# Patient Record
Sex: MALE | Race: WHITE | NOT HISPANIC OR LATINO | Employment: FULL TIME | URBAN - METROPOLITAN AREA
[De-identification: names, ages, dates, MRNs, and addresses within clinical notes are randomized per-mention and may not be internally consistent; named-entity substitution may affect disease eponyms.]

---

## 2017-02-09 ENCOUNTER — COMMUNICATION - HEALTHEAST (OUTPATIENT)
Dept: RHEUMATOLOGY | Facility: CLINIC | Age: 57
End: 2017-02-09

## 2017-03-16 ENCOUNTER — OFFICE VISIT - HEALTHEAST (OUTPATIENT)
Dept: RHEUMATOLOGY | Facility: CLINIC | Age: 57
End: 2017-03-16

## 2017-03-16 ENCOUNTER — RECORDS - HEALTHEAST (OUTPATIENT)
Dept: GENERAL RADIOLOGY | Facility: CLINIC | Age: 57
End: 2017-03-16

## 2017-03-16 DIAGNOSIS — G89.29 OTHER CHRONIC PAIN: ICD-10-CM

## 2017-03-16 DIAGNOSIS — M25.561 CHRONIC PAIN OF RIGHT KNEE: ICD-10-CM

## 2017-03-16 DIAGNOSIS — G89.29 CHRONIC PAIN OF RIGHT KNEE: ICD-10-CM

## 2017-03-16 DIAGNOSIS — L40.50 ARTHROPATHIC PSORIASIS, UNSPECIFIED (H): ICD-10-CM

## 2017-03-16 DIAGNOSIS — M25.561 PAIN IN RIGHT KNEE: ICD-10-CM

## 2017-03-16 DIAGNOSIS — L40.50 PSORIATIC ARTHRITIS (H): ICD-10-CM

## 2017-03-16 DIAGNOSIS — M17.0 BILATERAL PRIMARY OSTEOARTHRITIS OF KNEE: ICD-10-CM

## 2017-03-16 DIAGNOSIS — Z79.899 HIGH RISK MEDICATION USE: ICD-10-CM

## 2017-03-16 LAB
ALT SERPL W P-5'-P-CCNC: 18 U/L (ref 0–45)
CREAT SERPL-MCNC: 0.91 MG/DL (ref 0.7–1.3)
GFR SERPL CREATININE-BSD FRML MDRD: >60 ML/MIN/1.73M2

## 2017-03-16 ASSESSMENT — MIFFLIN-ST. JEOR: SCORE: 1881.43

## 2017-03-22 ENCOUNTER — COMMUNICATION - HEALTHEAST (OUTPATIENT)
Dept: RHEUMATOLOGY | Facility: CLINIC | Age: 57
End: 2017-03-22

## 2017-03-22 DIAGNOSIS — L40.50 PSORIATIC ARTHRITIS (H): ICD-10-CM

## 2017-03-23 ENCOUNTER — COMMUNICATION - HEALTHEAST (OUTPATIENT)
Dept: RHEUMATOLOGY | Facility: CLINIC | Age: 57
End: 2017-03-23

## 2017-03-23 DIAGNOSIS — L40.50 PSORIATIC ARTHRITIS (H): ICD-10-CM

## 2017-04-24 ENCOUNTER — COMMUNICATION - HEALTHEAST (OUTPATIENT)
Dept: RHEUMATOLOGY | Facility: CLINIC | Age: 57
End: 2017-04-24

## 2018-03-29 ENCOUNTER — COMMUNICATION - HEALTHEAST (OUTPATIENT)
Dept: ADMINISTRATIVE | Facility: CLINIC | Age: 58
End: 2018-03-29

## 2018-04-17 ENCOUNTER — OFFICE VISIT - HEALTHEAST (OUTPATIENT)
Dept: RHEUMATOLOGY | Facility: CLINIC | Age: 58
End: 2018-04-17

## 2018-04-17 DIAGNOSIS — M46.1 DEGENERATIVE JOINT DISEASE OF SACROILIAC JOINT (H): ICD-10-CM

## 2018-04-17 DIAGNOSIS — M15.0 PRIMARY OSTEOARTHRITIS INVOLVING MULTIPLE JOINTS: ICD-10-CM

## 2018-04-17 DIAGNOSIS — Z79.899 HIGH RISK MEDICATION USE: ICD-10-CM

## 2018-04-17 DIAGNOSIS — L40.50 PSORIATIC ARTHRITIS (H): ICD-10-CM

## 2018-04-17 LAB
ALBUMIN SERPL-MCNC: 3.7 G/DL (ref 3.5–5)
ALT SERPL W P-5'-P-CCNC: 15 U/L (ref 0–45)
BASOPHILS # BLD AUTO: 0.1 THOU/UL (ref 0–0.2)
BASOPHILS NFR BLD AUTO: 1 % (ref 0–2)
C REACTIVE PROTEIN LHE: 0.7 MG/DL (ref 0–0.8)
CREAT SERPL-MCNC: 0.85 MG/DL (ref 0.7–1.3)
EOSINOPHIL # BLD AUTO: 0.3 THOU/UL (ref 0–0.4)
EOSINOPHIL NFR BLD AUTO: 3 % (ref 0–6)
ERYTHROCYTE [DISTWIDTH] IN BLOOD BY AUTOMATED COUNT: 12 % (ref 11–14.5)
ERYTHROCYTE [SEDIMENTATION RATE] IN BLOOD BY WESTERGREN METHOD: 15 MM/HR (ref 0–15)
GFR SERPL CREATININE-BSD FRML MDRD: >60 ML/MIN/1.73M2
HCT VFR BLD AUTO: 44.3 % (ref 40–54)
HGB BLD-MCNC: 15.2 G/DL (ref 14–18)
LYMPHOCYTES # BLD AUTO: 2 THOU/UL (ref 0.8–4.4)
LYMPHOCYTES NFR BLD AUTO: 20 % (ref 20–40)
MCH RBC QN AUTO: 30.5 PG (ref 27–34)
MCHC RBC AUTO-ENTMCNC: 34.3 G/DL (ref 32–36)
MCV RBC AUTO: 89 FL (ref 80–100)
MONOCYTES # BLD AUTO: 0.7 THOU/UL (ref 0–0.9)
MONOCYTES NFR BLD AUTO: 7 % (ref 2–10)
NEUTROPHILS # BLD AUTO: 7.1 THOU/UL (ref 2–7.7)
NEUTROPHILS NFR BLD AUTO: 70 % (ref 50–70)
PLATELET # BLD AUTO: 290 THOU/UL (ref 140–440)
PMV BLD AUTO: 7 FL (ref 7–10)
RBC # BLD AUTO: 4.99 MILL/UL (ref 4.4–6.2)
WBC: 10.1 THOU/UL (ref 4–11)

## 2018-04-17 ASSESSMENT — MIFFLIN-ST. JEOR: SCORE: 1813.6

## 2018-05-29 ENCOUNTER — AMBULATORY - HEALTHEAST (OUTPATIENT)
Dept: LAB | Facility: CLINIC | Age: 58
End: 2018-05-29

## 2018-05-29 DIAGNOSIS — Z79.899 HIGH RISK MEDICATION USE: ICD-10-CM

## 2018-05-29 LAB
ALBUMIN SERPL-MCNC: 3.7 G/DL (ref 3.5–5)
ALT SERPL W P-5'-P-CCNC: 27 U/L (ref 0–45)
CREAT SERPL-MCNC: 0.82 MG/DL (ref 0.7–1.3)
ERYTHROCYTE [DISTWIDTH] IN BLOOD BY AUTOMATED COUNT: 11.4 % (ref 11–14.5)
GFR SERPL CREATININE-BSD FRML MDRD: >60 ML/MIN/1.73M2
HCT VFR BLD AUTO: 42.6 % (ref 40–54)
HGB BLD-MCNC: 14.2 G/DL (ref 14–18)
MCH RBC QN AUTO: 29.7 PG (ref 27–34)
MCHC RBC AUTO-ENTMCNC: 33.2 G/DL (ref 32–36)
MCV RBC AUTO: 89 FL (ref 80–100)
PLATELET # BLD AUTO: 258 THOU/UL (ref 140–440)
PMV BLD AUTO: 6.7 FL (ref 7–10)
RBC # BLD AUTO: 4.77 MILL/UL (ref 4.4–6.2)
WBC: 6.2 THOU/UL (ref 4–11)

## 2018-06-12 ENCOUNTER — COMMUNICATION - HEALTHEAST (OUTPATIENT)
Dept: RHEUMATOLOGY | Facility: CLINIC | Age: 58
End: 2018-06-12

## 2018-06-12 DIAGNOSIS — L40.50 PSORIATIC ARTHRITIS (H): ICD-10-CM

## 2018-07-16 ENCOUNTER — AMBULATORY - HEALTHEAST (OUTPATIENT)
Dept: LAB | Facility: CLINIC | Age: 58
End: 2018-07-16

## 2018-07-16 DIAGNOSIS — Z79.899 HIGH RISK MEDICATION USE: ICD-10-CM

## 2018-07-16 LAB
ALBUMIN SERPL-MCNC: 3.8 G/DL (ref 3.5–5)
ALT SERPL W P-5'-P-CCNC: 28 U/L (ref 0–45)
CREAT SERPL-MCNC: 0.89 MG/DL (ref 0.7–1.3)
ERYTHROCYTE [DISTWIDTH] IN BLOOD BY AUTOMATED COUNT: 12.7 % (ref 11–14.5)
GFR SERPL CREATININE-BSD FRML MDRD: >60 ML/MIN/1.73M2
HCT VFR BLD AUTO: 37.4 % (ref 40–54)
HGB BLD-MCNC: 12.4 G/DL (ref 14–18)
MCH RBC QN AUTO: 27.2 PG (ref 27–34)
MCHC RBC AUTO-ENTMCNC: 33.1 G/DL (ref 32–36)
MCV RBC AUTO: 82 FL (ref 80–100)
PLATELET # BLD AUTO: 402 THOU/UL (ref 140–440)
PMV BLD AUTO: 7.8 FL (ref 7–10)
RBC # BLD AUTO: 4.56 MILL/UL (ref 4.4–6.2)
WBC: 8.7 THOU/UL (ref 4–11)

## 2018-07-19 ENCOUNTER — OFFICE VISIT - HEALTHEAST (OUTPATIENT)
Dept: RHEUMATOLOGY | Facility: CLINIC | Age: 58
End: 2018-07-19

## 2018-07-19 ENCOUNTER — COMMUNICATION - HEALTHEAST (OUTPATIENT)
Dept: RHEUMATOLOGY | Facility: CLINIC | Age: 58
End: 2018-07-19

## 2018-07-19 DIAGNOSIS — L40.50 PSORIATIC ARTHRITIS (H): ICD-10-CM

## 2018-07-19 DIAGNOSIS — M15.0 PRIMARY OSTEOARTHRITIS INVOLVING MULTIPLE JOINTS: ICD-10-CM

## 2018-07-19 DIAGNOSIS — G89.29 CHRONIC PAIN OF RIGHT KNEE: ICD-10-CM

## 2018-07-19 DIAGNOSIS — M25.561 CHRONIC PAIN OF RIGHT KNEE: ICD-10-CM

## 2018-07-19 DIAGNOSIS — Z79.899 HIGH RISK MEDICATION USE: ICD-10-CM

## 2018-08-31 ENCOUNTER — HOSPITAL ENCOUNTER (OUTPATIENT)
Dept: ULTRASOUND IMAGING | Facility: CLINIC | Age: 58
Discharge: HOME | End: 2018-08-31
Attending: FAMILY MEDICINE

## 2018-08-31 ENCOUNTER — RECORDS - HEALTHEAST (OUTPATIENT)
Dept: ADMINISTRATIVE | Facility: OTHER | Age: 58
End: 2018-08-31

## 2018-08-31 DIAGNOSIS — M79.89 SWELLING OF LEFT HAND: ICD-10-CM

## 2018-09-17 ENCOUNTER — COMMUNICATION - HEALTHEAST (OUTPATIENT)
Dept: RHEUMATOLOGY | Facility: CLINIC | Age: 58
End: 2018-09-17

## 2018-09-19 ENCOUNTER — OFFICE VISIT - HEALTHEAST (OUTPATIENT)
Dept: RHEUMATOLOGY | Facility: CLINIC | Age: 58
End: 2018-09-19

## 2018-09-19 DIAGNOSIS — M15.0 PRIMARY OSTEOARTHRITIS INVOLVING MULTIPLE JOINTS: ICD-10-CM

## 2018-09-19 DIAGNOSIS — L40.50 PSORIATIC ARTHRITIS (H): ICD-10-CM

## 2018-09-19 DIAGNOSIS — Z79.899 HIGH RISK MEDICATION USE: ICD-10-CM

## 2018-09-19 LAB
ALBUMIN SERPL-MCNC: 3.6 G/DL (ref 3.5–5)
ALT SERPL W P-5'-P-CCNC: 28 U/L (ref 0–45)
CREAT SERPL-MCNC: 0.92 MG/DL (ref 0.7–1.3)
ERYTHROCYTE [DISTWIDTH] IN BLOOD BY AUTOMATED COUNT: 11.5 % (ref 11–14.5)
GFR SERPL CREATININE-BSD FRML MDRD: >60 ML/MIN/1.73M2
HCT VFR BLD AUTO: 41.9 % (ref 40–54)
HGB BLD-MCNC: 14.6 G/DL (ref 14–18)
MCH RBC QN AUTO: 30.6 PG (ref 27–34)
MCHC RBC AUTO-ENTMCNC: 34.8 G/DL (ref 32–36)
MCV RBC AUTO: 88 FL (ref 80–100)
PLATELET # BLD AUTO: 253 THOU/UL (ref 140–440)
PMV BLD AUTO: 7.3 FL (ref 7–10)
RBC # BLD AUTO: 4.78 MILL/UL (ref 4.4–6.2)
WBC: 8 THOU/UL (ref 4–11)

## 2018-09-19 ASSESSMENT — MIFFLIN-ST. JEOR: SCORE: 1809.06

## 2018-11-20 ENCOUNTER — AMBULATORY - HEALTHEAST (OUTPATIENT)
Dept: LAB | Facility: CLINIC | Age: 58
End: 2018-11-20

## 2018-11-20 DIAGNOSIS — Z79.899 HIGH RISK MEDICATION USE: ICD-10-CM

## 2018-11-20 LAB
ALBUMIN SERPL-MCNC: 3.7 G/DL (ref 3.5–5)
ALT SERPL W P-5'-P-CCNC: 22 U/L (ref 0–45)
CREAT SERPL-MCNC: 0.81 MG/DL (ref 0.7–1.3)
ERYTHROCYTE [DISTWIDTH] IN BLOOD BY AUTOMATED COUNT: 11.5 % (ref 11–14.5)
GFR SERPL CREATININE-BSD FRML MDRD: >60 ML/MIN/1.73M2
HCT VFR BLD AUTO: 42.2 % (ref 40–54)
HGB BLD-MCNC: 14.6 G/DL (ref 14–18)
MCH RBC QN AUTO: 30.7 PG (ref 27–34)
MCHC RBC AUTO-ENTMCNC: 34.6 G/DL (ref 32–36)
MCV RBC AUTO: 89 FL (ref 80–100)
PLATELET # BLD AUTO: 245 THOU/UL (ref 140–440)
PMV BLD AUTO: 6.9 FL (ref 7–10)
RBC # BLD AUTO: 4.75 MILL/UL (ref 4.4–6.2)
WBC: 7.9 THOU/UL (ref 4–11)

## 2018-11-21 ENCOUNTER — OFFICE VISIT - HEALTHEAST (OUTPATIENT)
Dept: RHEUMATOLOGY | Facility: CLINIC | Age: 58
End: 2018-11-21

## 2018-11-21 DIAGNOSIS — L40.50 PSORIATIC ARTHRITIS (H): ICD-10-CM

## 2018-11-21 DIAGNOSIS — Z79.899 HIGH RISK MEDICATION USE: ICD-10-CM

## 2018-11-21 DIAGNOSIS — M15.0 PRIMARY OSTEOARTHRITIS INVOLVING MULTIPLE JOINTS: ICD-10-CM

## 2018-11-28 ENCOUNTER — COMMUNICATION - HEALTHEAST (OUTPATIENT)
Dept: RHEUMATOLOGY | Facility: CLINIC | Age: 58
End: 2018-11-28

## 2018-11-28 DIAGNOSIS — L40.50 PSORIATIC ARTHRITIS (H): ICD-10-CM

## 2018-12-13 ENCOUNTER — RECORDS - HEALTHEAST (OUTPATIENT)
Dept: LAB | Facility: CLINIC | Age: 58
End: 2018-12-13

## 2018-12-13 LAB
ANION GAP SERPL CALCULATED.3IONS-SCNC: 10 MMOL/L (ref 5–18)
BUN SERPL-MCNC: 14 MG/DL (ref 8–22)
CALCIUM SERPL-MCNC: 9.5 MG/DL (ref 8.5–10.5)
CHLORIDE BLD-SCNC: 107 MMOL/L (ref 98–107)
CO2 SERPL-SCNC: 26 MMOL/L (ref 22–31)
CREAT SERPL-MCNC: 0.82 MG/DL (ref 0.7–1.3)
GFR SERPL CREATININE-BSD FRML MDRD: >60 ML/MIN/1.73M2
GLUCOSE BLD-MCNC: 77 MG/DL (ref 70–125)
POTASSIUM BLD-SCNC: 4.4 MMOL/L (ref 3.5–5)
SODIUM SERPL-SCNC: 143 MMOL/L (ref 136–145)

## 2018-12-14 LAB — HBA1C MFR BLD: 5.4 % (ref 4.2–6.1)

## 2019-01-25 ENCOUNTER — COMMUNICATION - HEALTHEAST (OUTPATIENT)
Dept: RHEUMATOLOGY | Facility: CLINIC | Age: 59
End: 2019-01-25

## 2019-01-25 DIAGNOSIS — L40.50 PSORIATIC ARTHRITIS (H): ICD-10-CM

## 2019-02-23 ENCOUNTER — COMMUNICATION - HEALTHEAST (OUTPATIENT)
Dept: RHEUMATOLOGY | Facility: CLINIC | Age: 59
End: 2019-02-23

## 2019-02-23 DIAGNOSIS — L40.50 PSORIATIC ARTHRITIS (H): ICD-10-CM

## 2019-03-04 ENCOUNTER — AMBULATORY - HEALTHEAST (OUTPATIENT)
Dept: LAB | Facility: CLINIC | Age: 59
End: 2019-03-04

## 2019-03-04 DIAGNOSIS — Z79.899 HIGH RISK MEDICATION USE: ICD-10-CM

## 2019-03-04 LAB
ALBUMIN SERPL-MCNC: 3.9 G/DL (ref 3.5–5)
ALT SERPL W P-5'-P-CCNC: 19 U/L (ref 0–45)
CREAT SERPL-MCNC: 0.87 MG/DL (ref 0.7–1.3)
ERYTHROCYTE [DISTWIDTH] IN BLOOD BY AUTOMATED COUNT: 11.3 % (ref 11–14.5)
GFR SERPL CREATININE-BSD FRML MDRD: >60 ML/MIN/1.73M2
HCT VFR BLD AUTO: 42.7 % (ref 40–54)
HGB BLD-MCNC: 14.5 G/DL (ref 14–18)
MCH RBC QN AUTO: 30.1 PG (ref 27–34)
MCHC RBC AUTO-ENTMCNC: 33.9 G/DL (ref 32–36)
MCV RBC AUTO: 89 FL (ref 80–100)
PLATELET # BLD AUTO: 258 THOU/UL (ref 140–440)
PMV BLD AUTO: 6.8 FL (ref 7–10)
RBC # BLD AUTO: 4.8 MILL/UL (ref 4.4–6.2)
WBC: 8 THOU/UL (ref 4–11)

## 2019-03-05 ENCOUNTER — COMMUNICATION - HEALTHEAST (OUTPATIENT)
Dept: RHEUMATOLOGY | Facility: CLINIC | Age: 59
End: 2019-03-05

## 2019-03-05 ENCOUNTER — RECORDS - HEALTHEAST (OUTPATIENT)
Dept: ADMINISTRATIVE | Facility: OTHER | Age: 59
End: 2019-03-05

## 2019-03-05 DIAGNOSIS — L40.50 PSORIATIC ARTHRITIS (H): ICD-10-CM

## 2019-03-07 ENCOUNTER — AMBULATORY - HEALTHEAST (OUTPATIENT)
Dept: SURGERY | Facility: CLINIC | Age: 59
End: 2019-03-07

## 2019-03-07 DIAGNOSIS — K40.90 LEFT INGUINAL HERNIA: ICD-10-CM

## 2019-03-18 ENCOUNTER — OFFICE VISIT - HEALTHEAST (OUTPATIENT)
Dept: SURGERY | Facility: CLINIC | Age: 59
End: 2019-03-18

## 2019-03-18 DIAGNOSIS — K40.90 LEFT INGUINAL HERNIA: ICD-10-CM

## 2019-03-18 ASSESSMENT — MIFFLIN-ST. JEOR: SCORE: 1828.34

## 2019-05-05 ENCOUNTER — COMMUNICATION - HEALTHEAST (OUTPATIENT)
Dept: RHEUMATOLOGY | Facility: CLINIC | Age: 59
End: 2019-05-05

## 2019-05-05 DIAGNOSIS — L40.50 PSORIATIC ARTHRITIS (H): ICD-10-CM

## 2019-05-07 ENCOUNTER — OFFICE VISIT - HEALTHEAST (OUTPATIENT)
Dept: RHEUMATOLOGY | Facility: CLINIC | Age: 59
End: 2019-05-07

## 2019-05-07 DIAGNOSIS — L40.50 PSORIATIC ARTHRITIS (H): ICD-10-CM

## 2019-05-07 DIAGNOSIS — M15.0 PRIMARY OSTEOARTHRITIS INVOLVING MULTIPLE JOINTS: ICD-10-CM

## 2019-05-07 DIAGNOSIS — Z79.899 HIGH RISK MEDICATION USE: ICD-10-CM

## 2019-05-07 LAB
ALBUMIN SERPL-MCNC: 3.7 G/DL (ref 3.5–5)
ALT SERPL W P-5'-P-CCNC: 20 U/L (ref 0–45)
CREAT SERPL-MCNC: 0.91 MG/DL (ref 0.7–1.3)
ERYTHROCYTE [DISTWIDTH] IN BLOOD BY AUTOMATED COUNT: 11.4 % (ref 11–14.5)
GFR SERPL CREATININE-BSD FRML MDRD: >60 ML/MIN/1.73M2
HCT VFR BLD AUTO: 45.1 % (ref 40–54)
HGB BLD-MCNC: 15.1 G/DL (ref 14–18)
MCH RBC QN AUTO: 29.9 PG (ref 27–34)
MCHC RBC AUTO-ENTMCNC: 33.4 G/DL (ref 32–36)
MCV RBC AUTO: 89 FL (ref 80–100)
PLATELET # BLD AUTO: 280 THOU/UL (ref 140–440)
PMV BLD AUTO: 7.6 FL (ref 7–10)
RBC # BLD AUTO: 5.04 MILL/UL (ref 4.4–6.2)
WBC: 9.1 THOU/UL (ref 4–11)

## 2019-07-16 ENCOUNTER — COMMUNICATION - HEALTHEAST (OUTPATIENT)
Dept: RHEUMATOLOGY | Facility: CLINIC | Age: 59
End: 2019-07-16

## 2019-07-16 DIAGNOSIS — L40.50 PSORIATIC ARTHRITIS (H): ICD-10-CM

## 2019-08-05 ENCOUNTER — AMBULATORY - HEALTHEAST (OUTPATIENT)
Dept: LAB | Facility: CLINIC | Age: 59
End: 2019-08-05

## 2019-08-05 DIAGNOSIS — L40.50 PSORIATIC ARTHRITIS (H): ICD-10-CM

## 2019-08-05 LAB
ALBUMIN SERPL-MCNC: 3.7 G/DL (ref 3.5–5)
ALT SERPL W P-5'-P-CCNC: 27 U/L (ref 0–45)
CREAT SERPL-MCNC: 0.83 MG/DL (ref 0.7–1.3)
ERYTHROCYTE [DISTWIDTH] IN BLOOD BY AUTOMATED COUNT: 12.2 % (ref 11–14.5)
GFR SERPL CREATININE-BSD FRML MDRD: >60 ML/MIN/1.73M2
HCT VFR BLD AUTO: 44.2 % (ref 40–54)
HGB BLD-MCNC: 14.7 G/DL (ref 14–18)
MCH RBC QN AUTO: 29.7 PG (ref 27–34)
MCHC RBC AUTO-ENTMCNC: 33.3 G/DL (ref 32–36)
MCV RBC AUTO: 89 FL (ref 80–100)
PLATELET # BLD AUTO: 258 THOU/UL (ref 140–440)
PMV BLD AUTO: 7.6 FL (ref 7–10)
RBC # BLD AUTO: 4.96 MILL/UL (ref 4.4–6.2)
WBC: 6.4 THOU/UL (ref 4–11)

## 2019-08-07 ENCOUNTER — COMMUNICATION - HEALTHEAST (OUTPATIENT)
Dept: RHEUMATOLOGY | Facility: CLINIC | Age: 59
End: 2019-08-07

## 2019-08-07 ENCOUNTER — COMMUNICATION - HEALTHEAST (OUTPATIENT)
Dept: TELEHEALTH | Facility: CLINIC | Age: 59
End: 2019-08-07

## 2019-08-07 ENCOUNTER — OFFICE VISIT - HEALTHEAST (OUTPATIENT)
Dept: RHEUMATOLOGY | Facility: CLINIC | Age: 59
End: 2019-08-07

## 2019-08-07 DIAGNOSIS — L40.50 PSORIATIC ARTHRITIS (H): ICD-10-CM

## 2019-08-07 DIAGNOSIS — M46.1 DEGENERATIVE JOINT DISEASE OF SACROILIAC JOINT (H): ICD-10-CM

## 2019-08-07 DIAGNOSIS — L40.9 PSORIASIS: ICD-10-CM

## 2019-08-13 ENCOUNTER — COMMUNICATION - HEALTHEAST (OUTPATIENT)
Dept: ADMINISTRATIVE | Facility: CLINIC | Age: 59
End: 2019-08-13

## 2019-10-09 ENCOUNTER — COMMUNICATION - HEALTHEAST (OUTPATIENT)
Dept: RHEUMATOLOGY | Facility: CLINIC | Age: 59
End: 2019-10-09

## 2019-10-09 DIAGNOSIS — L40.50 PSORIATIC ARTHRITIS (H): ICD-10-CM

## 2019-11-09 ENCOUNTER — COMMUNICATION - HEALTHEAST (OUTPATIENT)
Dept: RHEUMATOLOGY | Facility: CLINIC | Age: 59
End: 2019-11-09

## 2019-11-09 DIAGNOSIS — L40.50 PSORIATIC ARTHRITIS (H): ICD-10-CM

## 2019-11-12 ENCOUNTER — AMBULATORY - HEALTHEAST (OUTPATIENT)
Dept: LAB | Facility: CLINIC | Age: 59
End: 2019-11-12

## 2019-11-12 DIAGNOSIS — L40.50 PSORIATIC ARTHRITIS (H): ICD-10-CM

## 2019-11-12 LAB
ALBUMIN SERPL-MCNC: 3.7 G/DL (ref 3.5–5)
ALT SERPL W P-5'-P-CCNC: 35 U/L (ref 0–45)
CREAT SERPL-MCNC: 0.87 MG/DL (ref 0.7–1.3)
ERYTHROCYTE [DISTWIDTH] IN BLOOD BY AUTOMATED COUNT: 11.7 % (ref 11–14.5)
GFR SERPL CREATININE-BSD FRML MDRD: >60 ML/MIN/1.73M2
HCT VFR BLD AUTO: 46.2 % (ref 40–54)
HGB BLD-MCNC: 15.3 G/DL (ref 14–18)
MCH RBC QN AUTO: 29.7 PG (ref 27–34)
MCHC RBC AUTO-ENTMCNC: 33.1 G/DL (ref 32–36)
MCV RBC AUTO: 90 FL (ref 80–100)
PLATELET # BLD AUTO: 265 THOU/UL (ref 140–440)
PMV BLD AUTO: 7.8 FL (ref 7–10)
RBC # BLD AUTO: 5.14 MILL/UL (ref 4.4–6.2)
WBC: 7.4 THOU/UL (ref 4–11)

## 2019-11-13 ENCOUNTER — COMMUNICATION - HEALTHEAST (OUTPATIENT)
Dept: RHEUMATOLOGY | Facility: CLINIC | Age: 59
End: 2019-11-13

## 2019-11-13 ENCOUNTER — OFFICE VISIT - HEALTHEAST (OUTPATIENT)
Dept: RHEUMATOLOGY | Facility: CLINIC | Age: 59
End: 2019-11-13

## 2019-11-13 DIAGNOSIS — M15.0 PRIMARY OSTEOARTHRITIS INVOLVING MULTIPLE JOINTS: ICD-10-CM

## 2019-11-13 DIAGNOSIS — L40.50 PSORIATIC ARTHRITIS (H): ICD-10-CM

## 2019-11-13 DIAGNOSIS — G89.29 CHRONIC PAIN OF RIGHT KNEE: ICD-10-CM

## 2019-11-13 DIAGNOSIS — M25.561 CHRONIC PAIN OF RIGHT KNEE: ICD-10-CM

## 2019-11-13 LAB
APPEARANCE FLD: ABNORMAL
COLOR FLD: YELLOW
CRYSTALS SNV MICRO: NORMAL
EOSINOPHIL NFR FLD MANUAL: ABNORMAL %
LYMPHOCYTES NFR FLD MANUAL: 28 %
MACROPHAGE % - HISTORICAL: 35 %
MESOTHELIALS, FLUID: ABNORMAL
MONOCYTE % - HISTORICAL: 15 %
NEUTS BAND NFR FLD MANUAL: 22 %
OTHER CELLS FLD MANUAL: ABNORMAL
RBC FLUID - HISTORICAL: ABNORMAL /UL
WBC # FLD AUTO: 302 /UL (ref 0–99)

## 2019-11-13 ASSESSMENT — MIFFLIN-ST. JEOR: SCORE: 1845.57

## 2019-11-16 LAB
BACTERIA SPEC CULT: NO GROWTH
GRAM STAIN RESULT: NORMAL
GRAM STAIN RESULT: NORMAL

## 2019-12-12 ENCOUNTER — COMMUNICATION - HEALTHEAST (OUTPATIENT)
Dept: RHEUMATOLOGY | Facility: CLINIC | Age: 59
End: 2019-12-12

## 2019-12-12 DIAGNOSIS — L40.50 PSORIATIC ARTHRITIS (H): ICD-10-CM

## 2020-01-28 ENCOUNTER — COMMUNICATION - HEALTHEAST (OUTPATIENT)
Dept: ADMINISTRATIVE | Facility: CLINIC | Age: 60
End: 2020-01-28

## 2020-01-28 DIAGNOSIS — L40.50 PSORIATIC ARTHRITIS (H): ICD-10-CM

## 2020-01-29 ENCOUNTER — COMMUNICATION - HEALTHEAST (OUTPATIENT)
Dept: RHEUMATOLOGY | Facility: CLINIC | Age: 60
End: 2020-01-29

## 2020-02-10 ENCOUNTER — AMBULATORY - HEALTHEAST (OUTPATIENT)
Dept: LAB | Facility: CLINIC | Age: 60
End: 2020-02-10

## 2020-02-10 DIAGNOSIS — L40.50 PSORIATIC ARTHRITIS (H): ICD-10-CM

## 2020-02-10 LAB
ALBUMIN SERPL-MCNC: 3.8 G/DL (ref 3.5–5)
ALT SERPL W P-5'-P-CCNC: 25 U/L (ref 0–45)
CREAT SERPL-MCNC: 0.96 MG/DL (ref 0.7–1.3)
ERYTHROCYTE [DISTWIDTH] IN BLOOD BY AUTOMATED COUNT: 13.6 % (ref 11–14.5)
GFR SERPL CREATININE-BSD FRML MDRD: >60 ML/MIN/1.73M2
HCT VFR BLD AUTO: 47 % (ref 40–54)
HGB BLD-MCNC: 15.1 G/DL (ref 14–18)
MCH RBC QN AUTO: 30 PG (ref 27–34)
MCHC RBC AUTO-ENTMCNC: 32.1 G/DL (ref 32–36)
MCV RBC AUTO: 93 FL (ref 80–100)
PLATELET # BLD AUTO: 265 THOU/UL (ref 140–440)
PMV BLD AUTO: 9.9 FL (ref 8.5–12.5)
RBC # BLD AUTO: 5.03 MILL/UL (ref 4.4–6.2)
WBC: 7.5 THOU/UL (ref 4–11)

## 2020-02-13 ENCOUNTER — COMMUNICATION - HEALTHEAST (OUTPATIENT)
Dept: RHEUMATOLOGY | Facility: CLINIC | Age: 60
End: 2020-02-13

## 2020-02-13 ENCOUNTER — OFFICE VISIT - HEALTHEAST (OUTPATIENT)
Dept: RHEUMATOLOGY | Facility: CLINIC | Age: 60
End: 2020-02-13

## 2020-02-13 DIAGNOSIS — M15.0 PRIMARY OSTEOARTHRITIS INVOLVING MULTIPLE JOINTS: ICD-10-CM

## 2020-02-13 DIAGNOSIS — L40.50 PSORIATIC ARTHRITIS (H): ICD-10-CM

## 2020-02-13 DIAGNOSIS — Z79.899 HIGH RISK MEDICATION USE: ICD-10-CM

## 2020-02-13 DIAGNOSIS — L40.9 PSORIASIS: ICD-10-CM

## 2020-02-13 ASSESSMENT — MIFFLIN-ST. JEOR: SCORE: 1841.94

## 2020-04-29 ENCOUNTER — AMBULATORY - HEALTHEAST (OUTPATIENT)
Dept: RHEUMATOLOGY | Facility: CLINIC | Age: 60
End: 2020-04-29

## 2020-04-29 DIAGNOSIS — L40.50 PSORIATIC ARTHRITIS (H): ICD-10-CM

## 2020-05-05 ENCOUNTER — COMMUNICATION - HEALTHEAST (OUTPATIENT)
Dept: RHEUMATOLOGY | Facility: CLINIC | Age: 60
End: 2020-05-05

## 2020-05-14 ENCOUNTER — AMBULATORY - HEALTHEAST (OUTPATIENT)
Dept: RHEUMATOLOGY | Facility: CLINIC | Age: 60
End: 2020-05-14

## 2020-05-14 ENCOUNTER — COMMUNICATION - HEALTHEAST (OUTPATIENT)
Dept: RHEUMATOLOGY | Facility: CLINIC | Age: 60
End: 2020-05-14

## 2020-05-14 DIAGNOSIS — L40.50 PSORIATIC ARTHRITIS (H): ICD-10-CM

## 2020-05-18 ENCOUNTER — AMBULATORY - HEALTHEAST (OUTPATIENT)
Dept: LAB | Facility: CLINIC | Age: 60
End: 2020-05-18

## 2020-05-18 DIAGNOSIS — L40.50 PSORIATIC ARTHRITIS (H): ICD-10-CM

## 2020-05-18 LAB
ALBUMIN SERPL-MCNC: 3.6 G/DL (ref 3.5–5)
ALT SERPL W P-5'-P-CCNC: 25 U/L (ref 0–45)
CREAT SERPL-MCNC: 0.89 MG/DL (ref 0.7–1.3)
ERYTHROCYTE [DISTWIDTH] IN BLOOD BY AUTOMATED COUNT: 11.2 % (ref 11–14.5)
GFR SERPL CREATININE-BSD FRML MDRD: >60 ML/MIN/1.73M2
HCT VFR BLD AUTO: 43.5 % (ref 40–54)
HGB BLD-MCNC: 15 G/DL (ref 14–18)
MCH RBC QN AUTO: 31.2 PG (ref 27–34)
MCHC RBC AUTO-ENTMCNC: 34.5 G/DL (ref 32–36)
MCV RBC AUTO: 90 FL (ref 80–100)
PLATELET # BLD AUTO: 226 THOU/UL (ref 140–440)
PMV BLD AUTO: 8.5 FL (ref 7–10)
RBC # BLD AUTO: 4.81 MILL/UL (ref 4.4–6.2)
WBC: 8 THOU/UL (ref 4–11)

## 2020-05-21 ENCOUNTER — OFFICE VISIT - HEALTHEAST (OUTPATIENT)
Dept: RHEUMATOLOGY | Facility: CLINIC | Age: 60
End: 2020-05-21

## 2020-05-21 DIAGNOSIS — M15.0 PRIMARY OSTEOARTHRITIS INVOLVING MULTIPLE JOINTS: ICD-10-CM

## 2020-05-21 DIAGNOSIS — Z79.899 HIGH RISK MEDICATION USE: ICD-10-CM

## 2020-05-21 DIAGNOSIS — G89.29 CHRONIC PAIN OF RIGHT KNEE: ICD-10-CM

## 2020-05-21 DIAGNOSIS — L40.9 PSORIASIS: ICD-10-CM

## 2020-05-21 DIAGNOSIS — M25.561 CHRONIC PAIN OF RIGHT KNEE: ICD-10-CM

## 2020-05-21 DIAGNOSIS — L40.50 PSORIATIC ARTHRITIS (H): ICD-10-CM

## 2020-05-24 ENCOUNTER — COMMUNICATION - HEALTHEAST (OUTPATIENT)
Dept: RHEUMATOLOGY | Facility: CLINIC | Age: 60
End: 2020-05-24

## 2020-05-24 DIAGNOSIS — L40.50 PSORIATIC ARTHRITIS (H): ICD-10-CM

## 2020-08-17 ENCOUNTER — AMBULATORY - HEALTHEAST (OUTPATIENT)
Dept: LAB | Facility: CLINIC | Age: 60
End: 2020-08-17

## 2020-08-17 DIAGNOSIS — L40.50 PSORIATIC ARTHRITIS (H): ICD-10-CM

## 2020-08-17 LAB
ALBUMIN SERPL-MCNC: 3.8 G/DL (ref 3.5–5)
ALT SERPL W P-5'-P-CCNC: 25 U/L (ref 0–45)
CREAT SERPL-MCNC: 0.88 MG/DL (ref 0.7–1.3)
ERYTHROCYTE [DISTWIDTH] IN BLOOD BY AUTOMATED COUNT: 11.3 % (ref 11–14.5)
GFR SERPL CREATININE-BSD FRML MDRD: >60 ML/MIN/1.73M2
HCT VFR BLD AUTO: 42.7 % (ref 40–54)
HGB BLD-MCNC: 14.4 G/DL (ref 14–18)
MCH RBC QN AUTO: 30.8 PG (ref 27–34)
MCHC RBC AUTO-ENTMCNC: 33.7 G/DL (ref 32–36)
MCV RBC AUTO: 92 FL (ref 80–100)
PLATELET # BLD AUTO: 231 THOU/UL (ref 140–440)
PMV BLD AUTO: 7.6 FL (ref 7–10)
RBC # BLD AUTO: 4.67 MILL/UL (ref 4.4–6.2)
WBC: 9.2 THOU/UL (ref 4–11)

## 2020-08-19 ENCOUNTER — COMMUNICATION - HEALTHEAST (OUTPATIENT)
Dept: RHEUMATOLOGY | Facility: CLINIC | Age: 60
End: 2020-08-19

## 2020-08-19 DIAGNOSIS — L40.50 PSORIATIC ARTHRITIS (H): ICD-10-CM

## 2020-09-17 ENCOUNTER — OFFICE VISIT - HEALTHEAST (OUTPATIENT)
Dept: RHEUMATOLOGY | Facility: CLINIC | Age: 60
End: 2020-09-17

## 2020-09-17 DIAGNOSIS — M15.0 PRIMARY OSTEOARTHRITIS INVOLVING MULTIPLE JOINTS: ICD-10-CM

## 2020-09-17 DIAGNOSIS — L40.50 PSORIATIC ARTHRITIS (H): ICD-10-CM

## 2020-09-17 DIAGNOSIS — Z79.899 HIGH RISK MEDICATION USE: ICD-10-CM

## 2020-09-17 DIAGNOSIS — L40.9 PSORIASIS: ICD-10-CM

## 2020-10-21 ENCOUNTER — OFFICE VISIT - HEALTHEAST (OUTPATIENT)
Dept: RHEUMATOLOGY | Facility: CLINIC | Age: 60
End: 2020-10-21

## 2020-10-21 ENCOUNTER — COMMUNICATION - HEALTHEAST (OUTPATIENT)
Dept: RHEUMATOLOGY | Facility: CLINIC | Age: 60
End: 2020-10-21

## 2020-10-21 DIAGNOSIS — Z79.899 HIGH RISK MEDICATION USE: ICD-10-CM

## 2020-10-21 DIAGNOSIS — L40.50 PSORIATIC ARTHRITIS (H): ICD-10-CM

## 2020-10-21 DIAGNOSIS — L40.9 PSORIASIS: ICD-10-CM

## 2020-10-21 DIAGNOSIS — G89.29 CHRONIC PAIN OF RIGHT KNEE: ICD-10-CM

## 2020-10-21 DIAGNOSIS — M15.0 PRIMARY OSTEOARTHRITIS INVOLVING MULTIPLE JOINTS: ICD-10-CM

## 2020-10-21 DIAGNOSIS — M25.561 CHRONIC PAIN OF RIGHT KNEE: ICD-10-CM

## 2020-11-13 ENCOUNTER — AMBULATORY - HEALTHEAST (OUTPATIENT)
Dept: SURGERY | Facility: AMBULATORY SURGERY CENTER | Age: 60
End: 2020-11-13

## 2020-11-13 ENCOUNTER — COMMUNICATION - HEALTHEAST (OUTPATIENT)
Dept: SURGERY | Facility: CLINIC | Age: 60
End: 2020-11-13

## 2020-11-13 ENCOUNTER — OFFICE VISIT - HEALTHEAST (OUTPATIENT)
Dept: SURGERY | Facility: CLINIC | Age: 60
End: 2020-11-13

## 2020-11-13 DIAGNOSIS — K40.90 LEFT INGUINAL HERNIA: ICD-10-CM

## 2020-11-13 DIAGNOSIS — Z11.59 ENCOUNTER FOR SCREENING FOR OTHER VIRAL DISEASES: ICD-10-CM

## 2020-11-18 ENCOUNTER — COMMUNICATION - HEALTHEAST (OUTPATIENT)
Dept: RHEUMATOLOGY | Facility: CLINIC | Age: 60
End: 2020-11-18

## 2020-11-18 DIAGNOSIS — L40.50 PSORIATIC ARTHRITIS (H): ICD-10-CM

## 2020-12-02 ENCOUNTER — COMMUNICATION - HEALTHEAST (OUTPATIENT)
Dept: RHEUMATOLOGY | Facility: CLINIC | Age: 60
End: 2020-12-02

## 2020-12-14 ENCOUNTER — AMBULATORY - HEALTHEAST (OUTPATIENT)
Dept: LAB | Facility: CLINIC | Age: 60
End: 2020-12-14

## 2020-12-14 DIAGNOSIS — Z11.59 ENCOUNTER FOR SCREENING FOR OTHER VIRAL DISEASES: ICD-10-CM

## 2020-12-15 ENCOUNTER — COMMUNICATION - HEALTHEAST (OUTPATIENT)
Dept: SCHEDULING | Facility: CLINIC | Age: 60
End: 2020-12-15

## 2020-12-15 ENCOUNTER — AMBULATORY - HEALTHEAST (OUTPATIENT)
Dept: LAB | Facility: CLINIC | Age: 60
End: 2020-12-15

## 2020-12-15 DIAGNOSIS — L40.50 PSORIATIC ARTHRITIS (H): ICD-10-CM

## 2020-12-15 LAB
ALBUMIN SERPL-MCNC: 3.9 G/DL (ref 3.5–5)
ALT SERPL W P-5'-P-CCNC: 20 U/L (ref 0–45)
CREAT SERPL-MCNC: 0.92 MG/DL (ref 0.7–1.3)
ERYTHROCYTE [DISTWIDTH] IN BLOOD BY AUTOMATED COUNT: 11.8 % (ref 11–14.5)
GFR SERPL CREATININE-BSD FRML MDRD: >60 ML/MIN/1.73M2
HCT VFR BLD AUTO: 44.2 % (ref 40–54)
HGB BLD-MCNC: 14.9 G/DL (ref 14–18)
MCH RBC QN AUTO: 30.5 PG (ref 27–34)
MCHC RBC AUTO-ENTMCNC: 33.8 G/DL (ref 32–36)
MCV RBC AUTO: 90 FL (ref 80–100)
PLATELET # BLD AUTO: 271 THOU/UL (ref 140–440)
PMV BLD AUTO: 7.6 FL (ref 7–10)
RBC # BLD AUTO: 4.88 MILL/UL (ref 4.4–6.2)
WBC: 7.4 THOU/UL (ref 4–11)

## 2020-12-16 ENCOUNTER — ANESTHESIA - HEALTHEAST (OUTPATIENT)
Dept: SURGERY | Facility: AMBULATORY SURGERY CENTER | Age: 60
End: 2020-12-16

## 2020-12-16 ASSESSMENT — MIFFLIN-ST. JEOR: SCORE: 1814.73

## 2020-12-17 ENCOUNTER — SURGERY - HEALTHEAST (OUTPATIENT)
Dept: SURGERY | Facility: AMBULATORY SURGERY CENTER | Age: 60
End: 2020-12-17

## 2020-12-17 ASSESSMENT — MIFFLIN-ST. JEOR: SCORE: 1802.83

## 2020-12-21 ENCOUNTER — OFFICE VISIT - HEALTHEAST (OUTPATIENT)
Dept: RHEUMATOLOGY | Facility: CLINIC | Age: 60
End: 2020-12-21

## 2020-12-21 DIAGNOSIS — L40.9 PSORIASIS: ICD-10-CM

## 2020-12-21 DIAGNOSIS — M15.0 PRIMARY OSTEOARTHRITIS INVOLVING MULTIPLE JOINTS: ICD-10-CM

## 2020-12-21 DIAGNOSIS — L40.50 PSORIATIC ARTHRITIS (H): ICD-10-CM

## 2020-12-21 DIAGNOSIS — Z79.899 HIGH RISK MEDICATION USE: ICD-10-CM

## 2020-12-31 ENCOUNTER — OFFICE VISIT - HEALTHEAST (OUTPATIENT)
Dept: SURGERY | Facility: CLINIC | Age: 60
End: 2020-12-31

## 2020-12-31 DIAGNOSIS — Z48.89 POSTOPERATIVE VISIT: ICD-10-CM

## 2021-03-22 ENCOUNTER — AMBULATORY - HEALTHEAST (OUTPATIENT)
Dept: LAB | Facility: CLINIC | Age: 61
End: 2021-03-22

## 2021-03-22 DIAGNOSIS — L40.50 PSORIATIC ARTHRITIS (H): ICD-10-CM

## 2021-03-22 LAB
ALBUMIN SERPL-MCNC: 3.8 G/DL (ref 3.5–5)
ALT SERPL W P-5'-P-CCNC: 20 U/L (ref 0–45)
CREAT SERPL-MCNC: 0.91 MG/DL (ref 0.7–1.3)
ERYTHROCYTE [DISTWIDTH] IN BLOOD BY AUTOMATED COUNT: 13.3 % (ref 11–14.5)
GFR SERPL CREATININE-BSD FRML MDRD: >60 ML/MIN/1.73M2
HCT VFR BLD AUTO: 42.5 % (ref 40–54)
HGB BLD-MCNC: 14.3 G/DL (ref 14–18)
MCH RBC QN AUTO: 29.7 PG (ref 27–34)
MCHC RBC AUTO-ENTMCNC: 33.6 G/DL (ref 32–36)
MCV RBC AUTO: 88 FL (ref 80–100)
PLATELET # BLD AUTO: 269 THOU/UL (ref 140–440)
PMV BLD AUTO: 8.8 FL (ref 7–10)
RBC # BLD AUTO: 4.82 MILL/UL (ref 4.4–6.2)
WBC: 7 THOU/UL (ref 4–11)

## 2021-03-23 ENCOUNTER — COMMUNICATION - HEALTHEAST (OUTPATIENT)
Dept: RHEUMATOLOGY | Facility: CLINIC | Age: 61
End: 2021-03-23

## 2021-03-23 DIAGNOSIS — L40.50 PSORIATIC ARTHRITIS (H): ICD-10-CM

## 2021-03-25 ENCOUNTER — OFFICE VISIT - HEALTHEAST (OUTPATIENT)
Dept: RHEUMATOLOGY | Facility: CLINIC | Age: 61
End: 2021-03-25

## 2021-03-25 DIAGNOSIS — L40.50 PSORIATIC ARTHRITIS (H): ICD-10-CM

## 2021-03-25 DIAGNOSIS — M15.0 PRIMARY OSTEOARTHRITIS INVOLVING MULTIPLE JOINTS: ICD-10-CM

## 2021-03-25 DIAGNOSIS — M46.1 DEGENERATIVE JOINT DISEASE OF SACROILIAC JOINT (H): ICD-10-CM

## 2021-03-25 DIAGNOSIS — L40.9 PSORIASIS: ICD-10-CM

## 2021-03-25 DIAGNOSIS — Z79.899 HIGH RISK MEDICATION USE: ICD-10-CM

## 2021-05-18 ENCOUNTER — AMBULATORY - HEALTHEAST (OUTPATIENT)
Dept: RHEUMATOLOGY | Facility: CLINIC | Age: 61
End: 2021-05-18

## 2021-05-18 DIAGNOSIS — L40.50 PSORIATIC ARTHRITIS (H): ICD-10-CM

## 2021-05-25 ENCOUNTER — RECORDS - HEALTHEAST (OUTPATIENT)
Dept: ADMINISTRATIVE | Facility: CLINIC | Age: 61
End: 2021-05-25

## 2021-05-26 ENCOUNTER — RECORDS - HEALTHEAST (OUTPATIENT)
Dept: ADMINISTRATIVE | Facility: CLINIC | Age: 61
End: 2021-05-26

## 2021-05-27 VITALS — SYSTOLIC BLOOD PRESSURE: 124 MMHG | DIASTOLIC BLOOD PRESSURE: 70 MMHG

## 2021-05-28 NOTE — PROGRESS NOTES
ASSESSMENT AND PLAN:  Kai Jim 58 y.o. male is a for follow-up of psoriatic arthritis, doing growth on leflunomide due for labs to be done today, most of his discomfort is around the first MTPs, we discussed how osteoarthritis is the likely etiology here, options were discussed.  He has a good understanding of osteoarthritis versus psoriatic arthritis.  The option of corticosteroid injections and OA joints for symptomatic relief was reviewed.  Follow-up here in 3 months with labs today and prior to the next visit.      Diagnoses and all orders for this visit:    Psoriatic arthritis (H)    Primary osteoarthritis involving multiple joints    High risk medication use      HISTORY OF PRESENTING ILLNESS:  Kai Jim, 58 y.o., male is here for follow-up.  Has psoriatic arthritis.  He is on 20 mg of leflunomide seems to have tolerated well, due for labs.  He is done well with regards to joint pains overall with the exception of his feet.  This is in the first MTP area.  He reports pain with activity.  Sometimes at rest.  The more he is up and about the worse it gets.  He has noted swelling.  He reports leflunomide is not helped.  He rated the pain there are moderately severe.  He noted no pain in other joint areas except the knees but he has mild discomfort.  He is able to do all his day-to-day activities with the thumb MCP area with activity.  He noted no stiffness in the morning.  The small patches on the olecranon area.  Used to be on Enbrel for several years before insurance dropped to the years ago.  His psoriasis is minimally active.  He has  family history of rheumatoid arthritis in father.   He is not a smoker alcohol occasionally.   He injured his right index and middle finger chopping wood many years ago.  That was in 1986.  Further historical information and ADL limitations as noted in the multidimensional health assessment questionnaire attached in the EMR.    Following is the excerpt from a recent  note:      polyarthralgias.  Joints affected include the right hip(s) and the right knee(s). This has gone on for several months ago. Pain is described as sharp. It is when active and at night/ wakes from sleep at night.  His symptoms are moderate, severe. The symptoms are gradually worsening. Symptoms include tenderness to touch, limited range of motion.  Treatment to date has been without significant relief.  He has a long-standing history of psoriasis associated with psoriatic arthritis.  He was in his 30s when he had psoriasis.  He used to follow-up with Dr. Ervin salmeron.  He has been on Enbrel which is controlled rest of his joint symptoms quite nicely occasional back discomfort.  If he comes out of his truck in a certain way.  He has been documented to have lumbar spondylosis and sacroiliac degenerative changes on MRIs in the past.  He has undergone physical therapy.  He takes Mobic for that.  He noted pain in the right hip sometimes radiates down his leg.  He has had difficulty performing some of the day-to-day activities.  He had morning stiffness of 10 minutes. Rest of the 13 system ROS is negative.     ALLERGIES:Patient has no known allergies.    PAST MEDICAL/ACTIVE PROBLEMS/MEDICATION/ FAMILY HISTORY/SOCIAL DATA:  The patient has a family history of  Past Medical History:   Diagnosis Date     Psoriatic arthritis (H) 2016     Social History     Tobacco Use   Smoking Status Former Smoker     Types: Cigars     Last attempt to quit: 3/12/2019     Years since quittin.1   Smokeless Tobacco Never Used     Patient Active Problem List   Diagnosis     Psoriatic arthritis (H)     High risk medication use     Chronic pain of right knee     Primary osteoarthritis involving multiple joints     Degenerative joint disease of sacroiliac joint     Current Outpatient Medications   Medication Sig Dispense Refill     calcipotriene (DOVONOX) 0.005 % ointment        clobetasol (TEMOVATE) 0.05 % ointment         leflunomide (ARAVA) 20 MG tablet Take 1 tablet (20 mg total) by mouth daily. 30 tablet 1     multivitamin with minerals (THERA-M) 9 mg iron-400 mcg Tab tablet Take 1 tablet by mouth daily.       omega-3/dha/epa/fish oil (FISH OIL-OMEGA-3 FATTY ACIDS) 300-1,000 mg capsule Take 2 g by mouth daily.       tamsulosin (FLOMAX) 0.4 mg cap Take 0.4 mg by mouth daily.  3     No current facility-administered medications for this visit.      DETAILED EXAMINATION  05/07/19  :  Vitals:    05/07/19 1635   BP: 120/80   Patient Site: Right Arm   Patient Position: Sitting   Cuff Size: Adult Large   Pulse: 76   Weight: 217 lb (98.4 kg)     Alert oriented. Head including the face is examined for malar rash, heliotropes, scarring, lupus pernio. Eyes examined for redness such as in episcleritis/scleritis, periorbital lesions.   Neck/ Face examined for parotid gland swelling, range of motion of neck.  Left upper and lower and right upper and lower extremities examined for tenderness, swelling, warmth of the appendicular joints, range of motion, edema, rash.  Some of the important findings included: he does not have evidence of synovitis in any of the palpable joints of the upper extremities.  No significant deformities of the digits.  No JLT effusion or warmth of the knees.  Right index and middle finger amputation.  Minimal psoriatic lesions on olecranon areas bilaterally.  First metatarsophalangeal joints show bony hypertrophy and joint line tenderness.  LAB / IMAGING DATA:  ALT   Date Value Ref Range Status   03/04/2019 19 0 - 45 U/L Final   11/20/2018 22 0 - 45 U/L Final   09/19/2018 28 0 - 45 U/L Final     Albumin   Date Value Ref Range Status   03/04/2019 3.9 3.5 - 5.0 g/dL Final   11/20/2018 3.7 3.5 - 5.0 g/dL Final   09/19/2018 3.6 3.5 - 5.0 g/dL Final     Creatinine   Date Value Ref Range Status   03/04/2019 0.87 0.70 - 1.30 mg/dL Final   12/13/2018 0.82 0.70 - 1.30 mg/dL Final   11/20/2018 0.81 0.70 - 1.30 mg/dL Final       WBC    Date Value Ref Range Status   03/04/2019 8.0 4.0 - 11.0 thou/uL Final   11/20/2018 7.9 4.0 - 11.0 thou/uL Final     Hemoglobin   Date Value Ref Range Status   03/04/2019 14.5 14.0 - 18.0 g/dL Final   11/20/2018 14.6 14.0 - 18.0 g/dL Final   09/19/2018 14.6 14.0 - 18.0 g/dL Final     Platelets   Date Value Ref Range Status   03/04/2019 258 140 - 440 thou/uL Final   11/20/2018 245 140 - 440 thou/uL Final   09/19/2018 253 140 - 440 thou/uL Final       Lab Results   Component Value Date    SEDRATE 15 04/17/2018

## 2021-05-29 ENCOUNTER — RECORDS - HEALTHEAST (OUTPATIENT)
Dept: ADMINISTRATIVE | Facility: CLINIC | Age: 61
End: 2021-05-29

## 2021-05-30 VITALS — BODY MASS INDEX: 33.43 KG/M2 | HEIGHT: 70 IN | WEIGHT: 233.5 LBS

## 2021-05-31 NOTE — TELEPHONE ENCOUNTER
Called [facility] pharmacy and clarified Otezla starter pack and maintenance scripts. [facility] confirmed they got pts copay card numbers as well.

## 2021-05-31 NOTE — PROGRESS NOTES
ASSESSMENT AND PLAN:  Kai Jim 59 y.o. male is a for follow-up of psoriatic arthritis, psoriasis, osteoarthritis on leflunomide 20 mg, residual inflammation and dactylitis left second toe left first MCP.  And in Otezla.  Major side effects outlined.  No history of depression.  He will continue leflunomide during this time.  We discussed adding prednisone for a short while.  He would like to try without it.  Will meet here in 3 months.       Diagnoses and all orders for this visit:    Psoriatic arthritis (H)  -     apremilast (OTEZLA STARTER) 10 mg (4)-20 mg (4)-30 mg (47) DsPk; Take 10 mg by mouth 2 (two) times a day. 10 mg daily, to bring it to 30 mg twice daily  Dispense: 35 tablet; Refill: 0  -     apremilast 30 mg Tab; Take 30 mg by mouth 2 (two) times a day.  Dispense: 60 tablet; Refill: 3  -     leflunomide (ARAVA) 20 MG tablet; Take 1 tablet (20 mg total) by mouth daily.  Dispense: 30 tablet; Refill: 1    Degenerative joint disease of sacroiliac joint    Psoriasis  -     apremilast (OTEZLA STARTER) 10 mg (4)-20 mg (4)-30 mg (47) DsPk; Take 10 mg by mouth 2 (two) times a day. 10 mg daily, to bring it to 30 mg twice daily  Dispense: 35 tablet; Refill: 0  -     apremilast 30 mg Tab; Take 30 mg by mouth 2 (two) times a day.  Dispense: 60 tablet; Refill: 3      HISTORY OF PRESENTING ILLNESS:  Kai Jim, 59 y.o., male is here for follow-up.  Has psoriatic arthritis.  He is on 20 mg of leflunomide seems to have tolerated well, and this has helped many of his joints.  However he has residual pain in 2 areas.  This creates moderately severe pain.  Interfering with day-to-day activities.  He rates his pain a 6.5/10.  The worst pain is in the right foot and the second toe area.  This is swollen painful in the morning it can be stiff for several hours before it gets better by afternoon.  He also noted pain in his left thumb joint.  He has noted improvement in his psoriasis such as on the right olecranon area.   Recent labs are normal.  Used to be on Enbrel for several years before insurance dropped to the years ago.  His psoriasis is minimally active.  He has  family history of rheumatoid arthritis in father.   He is not a smoker alcohol occasionally.   He injured his right index and middle finger chopping wood many years ago.  That was in 1986.  Further historical information and ADL limitations as noted in the multidimensional health assessment questionnaire attached in the EMR.    Following is the excerpt from a recent note:      polyarthralgias.  Joints affected include the right hip(s) and the right knee(s). This has gone on for several months ago. Pain is described as sharp. It is when active and at night/ wakes from sleep at night.  His symptoms are moderate, severe. The symptoms are gradually worsening. Symptoms include tenderness to touch, limited range of motion.  Treatment to date has been without significant relief.  He has a long-standing history of psoriasis associated with psoriatic arthritis.  He was in his 30s when he had psoriasis.  He used to follow-up with Dr. Ervin salmeron.  He has been on Enbrel which is controlled rest of his joint symptoms quite nicely occasional back discomfort.  If he comes out of his truck in a certain way.  He has been documented to have lumbar spondylosis and sacroiliac degenerative changes on MRIs in the past.  He has undergone physical therapy.  He takes Mobic for that.  He noted pain in the right hip sometimes radiates down his leg.  He has had difficulty performing some of the day-to-day activities.  He had morning stiffness of 10 minutes. Rest of the 13 system ROS is negative.     ALLERGIES:Patient has no known allergies.    PAST MEDICAL/ACTIVE PROBLEMS/MEDICATION/ FAMILY HISTORY/SOCIAL DATA:  The patient has a family history of  Past Medical History:   Diagnosis Date     Psoriatic arthritis (H) 1/8/2016     Social History     Tobacco Use   Smoking Status Former Smoker      Types: Cigars     Last attempt to quit: 3/12/2019     Years since quittin.4   Smokeless Tobacco Never Used     Patient Active Problem List   Diagnosis     Psoriatic arthritis (H)     High risk medication use     Chronic pain of right knee     Primary osteoarthritis involving multiple joints     Degenerative joint disease of sacroiliac joint     Current Outpatient Medications   Medication Sig Dispense Refill     calcipotriene (DOVONOX) 0.005 % ointment        clobetasol (TEMOVATE) 0.05 % ointment        leflunomide (ARAVA) 20 MG tablet TAKE 1 TABLET(20 MG) BY MOUTH DAILY 30 tablet 0     multivitamin with minerals (THERA-M) 9 mg iron-400 mcg Tab tablet Take 1 tablet by mouth daily.       omega-3/dha/epa/fish oil (FISH OIL-OMEGA-3 FATTY ACIDS) 300-1,000 mg capsule Take 2 g by mouth daily.       apremilast (OTEZLA STARTER) 10 mg (4)-20 mg (4)-30 mg (47) DsPk Take 10 mg by mouth 2 (two) times a day. 10 mg daily, to bring it to 30 mg twice daily 35 tablet 0     apremilast 30 mg Tab Take 30 mg by mouth 2 (two) times a day. 60 tablet 3     tamsulosin (FLOMAX) 0.4 mg cap Take 0.4 mg by mouth daily.  3     No current facility-administered medications for this visit.      DETAILED EXAMINATION  19  :  Vitals:    19 0812   BP: 138/86   Pulse: 72   Weight: 221 lb (100.2 kg)     Alert oriented. Head including the face is examined for malar rash, heliotropes, scarring, lupus pernio. Eyes examined for redness such as in episcleritis/scleritis, periorbital lesions.   Neck/ Face examined for parotid gland swelling, range of motion of neck.  Left upper and lower and right upper and lower extremities examined for tenderness, swelling, warmth of the appendicular joints, range of motion, edema, rash.  Some of the important findings included: First metatarsophalangeal joints show bony hypertrophy and joint line tenderness.  The left second toe shows dactylitis, left second metatarsophalangeal joints are swollen tender.   Left first MCP swollen and tender.    No significant deformities of the digits.  No JLT effusion or warmth of the knees.  Right index and middle finger amputation.  Minimal psoriatic lesions on olecranon areas bilaterally.       LAB / IMAGING DATA:  ALT   Date Value Ref Range Status   08/05/2019 27 0 - 45 U/L Final   05/07/2019 20 0 - 45 U/L Final   03/04/2019 19 0 - 45 U/L Final     Albumin   Date Value Ref Range Status   08/05/2019 3.7 3.5 - 5.0 g/dL Final   05/07/2019 3.7 3.5 - 5.0 g/dL Final   03/04/2019 3.9 3.5 - 5.0 g/dL Final     Creatinine   Date Value Ref Range Status   08/05/2019 0.83 0.70 - 1.30 mg/dL Final   05/07/2019 0.91 0.70 - 1.30 mg/dL Final   03/04/2019 0.87 0.70 - 1.30 mg/dL Final       WBC   Date Value Ref Range Status   08/05/2019 6.4 4.0 - 11.0 thou/uL Final   05/07/2019 9.1 4.0 - 11.0 thou/uL Final     Hemoglobin   Date Value Ref Range Status   08/05/2019 14.7 14.0 - 18.0 g/dL Final   05/07/2019 15.1 14.0 - 18.0 g/dL Final   03/04/2019 14.5 14.0 - 18.0 g/dL Final     Platelets   Date Value Ref Range Status   08/05/2019 258 140 - 440 thou/uL Final   05/07/2019 280 140 - 440 thou/uL Final   03/04/2019 258 140 - 440 thou/uL Final       Lab Results   Component Value Date    SEDRATE 15 04/17/2018

## 2021-06-01 VITALS — WEIGHT: 218 LBS | BODY MASS INDEX: 31.06 KG/M2

## 2021-06-01 VITALS — WEIGHT: 218 LBS | HEIGHT: 70 IN | BODY MASS INDEX: 31.21 KG/M2

## 2021-06-01 VITALS — HEIGHT: 70 IN | BODY MASS INDEX: 31.35 KG/M2 | WEIGHT: 219 LBS

## 2021-06-02 VITALS — WEIGHT: 217 LBS | BODY MASS INDEX: 29.64 KG/M2

## 2021-06-02 VITALS — WEIGHT: 218 LBS | BODY MASS INDEX: 31.06 KG/M2

## 2021-06-02 VITALS — WEIGHT: 217 LBS | BODY MASS INDEX: 29.39 KG/M2 | HEIGHT: 72 IN

## 2021-06-03 VITALS
WEIGHT: 220.8 LBS | SYSTOLIC BLOOD PRESSURE: 136 MMHG | DIASTOLIC BLOOD PRESSURE: 80 MMHG | HEART RATE: 74 BPM | HEIGHT: 72 IN | BODY MASS INDEX: 29.91 KG/M2

## 2021-06-03 VITALS — BODY MASS INDEX: 30.18 KG/M2 | WEIGHT: 221 LBS

## 2021-06-03 NOTE — PROGRESS NOTES
"ASSESSMENT AND PLAN:  Kai Jim 59 y.o. male is a for follow-up.  His psoriatic arthritis is doing so much better he has osteoarthritis.  He has tolerated leflunomide 20 mg and Otezla.  He has had pain swelling of the right knee where he has effusion discussed the aspiration he wants to proceed after pros and cons were reviewed, under ultrasound guidance, 10 mL of clear yellow noninflamed appearing fluid obtained, 40 mg of Kenalog injected to the same portal.  Follow-up here in 3 months.      Diagnoses and all orders for this visit:    Psoriatic arthritis (H)  -     triamcinolone acetonide 40 mg/mL injection 40 mg (KENALOG-40)  -     leflunomide (ARAVA) 20 MG tablet; Take 1 tablet (20 mg total) by mouth daily.  Dispense: 90 tablet; Refill: 0  -     apremilast 30 mg Tab; Take 30 mg by mouth 2 (two) times a day.  Dispense: 60 tablet; Refill: 3  -     Cell Ct/Diff, Body Fluid  -     Culture/Gram Stain: Body Fluid  -     Crystals, Body Fluid    Primary osteoarthritis involving multiple joints  -     triamcinolone acetonide 40 mg/mL injection 40 mg (KENALOG-40)    Chronic pain of right knee  -     triamcinolone acetonide 40 mg/mL injection 40 mg (KENALOG-40)      HISTORY OF PRESENTING ILLNESS:  Kai Jim, 59 y.o., male is here for follow-up.  Has psoriatic arthritis.  He is done so much better with combination of Arava, 20 mg daily, with Otezla that he has tolerated nicely.  He noted overall pain level to be 1.  His main concern is the right knee.  There is been moderately severely painful.  Is been swollen.  In the past he has been told that he has \"bone-on-bone arthritis there.  This is interfering with some of the day-to-day activities it is not given anything.  There is no morning stiffness.  His recent labs are normal.  He has noted improvement in swelling of his toes.  His psoriasis has resolved.  There were no significant GI side effects associated with Otezla. Used to be on Enbrel for several years " before insurance dropped to the years ago.  His psoriasis is minimally active.  He has  family history of rheumatoid arthritis in father.   He is not a smoker alcohol occasionally.   He injured his right index and middle finger chopping wood many years ago.  That was in 1986.  Further historical information and ADL limitations as noted in the multidimensional health assessment questionnaire attached in the EMR.    Following is the excerpt from a recent note:      polyarthralgias.  Joints affected include the right hip(s) and the right knee(s). This has gone on for several months ago. Pain is described as sharp. It is when active and at night/ wakes from sleep at night.  His symptoms are moderate, severe. The symptoms are gradually worsening. Symptoms include tenderness to touch, limited range of motion.  Treatment to date has been without significant relief.  He has a long-standing history of psoriasis associated with psoriatic arthritis.  He was in his 30s when he had psoriasis.  He used to follow-up with Dr. Ervin salmeron.  He has been on Enbrel which is controlled rest of his joint symptoms quite nicely occasional back discomfort.  If he comes out of his truck in a certain way.  He has been documented to have lumbar spondylosis and sacroiliac degenerative changes on MRIs in the past.  He has undergone physical therapy.  He takes Mobic for that.  He noted pain in the right hip sometimes radiates down his leg.  He has had difficulty performing some of the day-to-day activities.  He had morning stiffness of 10 minutes. Rest of the 13 system ROS is negative.     ALLERGIES:Patient has no known allergies.    PAST MEDICAL/ACTIVE PROBLEMS/MEDICATION/ FAMILY HISTORY/SOCIAL DATA:  The patient has a family history of  Past Medical History:   Diagnosis Date     Psoriatic arthritis (H) 1/8/2016     Social History     Tobacco Use   Smoking Status Former Smoker     Types: Cigars     Last attempt to quit: 3/12/2019     Years  "since quittin.6   Smokeless Tobacco Never Used     Patient Active Problem List   Diagnosis     Psoriatic arthritis (H)     High risk medication use     Chronic pain of right knee     Primary osteoarthritis involving multiple joints     Degenerative joint disease of sacroiliac joint     Current Outpatient Medications   Medication Sig Dispense Refill     apremilast (OTEZLA STARTER) 10 mg (4)-20 mg (4)-30 mg (47) DsPk Take 10 mg by mouth 2 (two) times a day. 10 mg daily, to bring it to 30 mg twice daily 35 tablet 0     calcipotriene (DOVONOX) 0.005 % ointment        clobetasol (TEMOVATE) 0.05 % ointment        leflunomide (ARAVA) 20 MG tablet TAKE 1 TABLET(20 MG) BY MOUTH DAILY 30 tablet 0     multivitamin with minerals (THERA-M) 9 mg iron-400 mcg Tab tablet Take 1 tablet by mouth daily.       omega-3/dha/epa/fish oil (FISH OIL-OMEGA-3 FATTY ACIDS) 300-1,000 mg capsule Take 2 g by mouth daily.       No current facility-administered medications for this visit.      DETAILED EXAMINATION  19  :  Vitals:    19 0810   BP: 136/80   Patient Site: Right Arm   Patient Position: Sitting   Cuff Size: Adult Regular   Pulse: 74   Weight: 220 lb 12.8 oz (100.2 kg)   Height: 5' 11.75\" (1.822 m)     Alert oriented. Head including the face is examined for malar rash, heliotropes, scarring, lupus pernio. Eyes examined for redness such as in episcleritis/scleritis, periorbital lesions.   Neck/ Face examined for parotid gland swelling, range of motion of neck.  Left upper and lower and right upper and lower extremities examined for tenderness, swelling, warmth of the appendicular joints, range of motion, edema, rash.  Some of the important findings included: He has has warmth swelling and effusion of the right knee confirmed on ultrasound, does not have inflammation of palpable joints of upper extremities.  No residual dactylitis of the toes.  .  Right index and middle finger amputation.  No residual psoriatic lesions such " as on the [facility] area.    LAB / IMAGING DATA:  ALT   Date Value Ref Range Status   11/12/2019 35 0 - 45 U/L Final   08/05/2019 27 0 - 45 U/L Final   05/07/2019 20 0 - 45 U/L Final     Albumin   Date Value Ref Range Status   11/12/2019 3.7 3.5 - 5.0 g/dL Final   08/05/2019 3.7 3.5 - 5.0 g/dL Final   05/07/2019 3.7 3.5 - 5.0 g/dL Final     Creatinine   Date Value Ref Range Status   11/12/2019 0.87 0.70 - 1.30 mg/dL Final   08/05/2019 0.83 0.70 - 1.30 mg/dL Final   05/07/2019 0.91 0.70 - 1.30 mg/dL Final       WBC   Date Value Ref Range Status   11/12/2019 7.4 4.0 - 11.0 thou/uL Final   08/05/2019 6.4 4.0 - 11.0 thou/uL Final     Hemoglobin   Date Value Ref Range Status   11/12/2019 15.3 14.0 - 18.0 g/dL Final   08/05/2019 14.7 14.0 - 18.0 g/dL Final   05/07/2019 15.1 14.0 - 18.0 g/dL Final     Platelets   Date Value Ref Range Status   11/12/2019 265 140 - 440 thou/uL Final   08/05/2019 258 140 - 440 thou/uL Final   05/07/2019 280 140 - 440 thou/uL Final       Lab Results   Component Value Date    SEDRATE 15 04/17/2018

## 2021-06-04 VITALS
SYSTOLIC BLOOD PRESSURE: 128 MMHG | DIASTOLIC BLOOD PRESSURE: 80 MMHG | WEIGHT: 215 LBS | BODY MASS INDEX: 29.36 KG/M2 | HEART RATE: 68 BPM

## 2021-06-04 VITALS
DIASTOLIC BLOOD PRESSURE: 84 MMHG | HEIGHT: 72 IN | WEIGHT: 220 LBS | SYSTOLIC BLOOD PRESSURE: 136 MMHG | BODY MASS INDEX: 29.8 KG/M2

## 2021-06-05 VITALS — BODY MASS INDEX: 29.96 KG/M2 | HEIGHT: 71 IN | WEIGHT: 214 LBS

## 2021-06-05 NOTE — TELEPHONE ENCOUNTER
[facility]    Please refill: [facility] a new [facility], per insurance.    Send to: [facility] - [place], [place] - [address]

## 2021-06-06 NOTE — PROGRESS NOTES
ASSESSMENT AND PLAN:  Kai Jim 59 y.o. male is a for follow-up of psoriatic arthritis, psoriasis, osteoarthritis, on leflunomide, Otezla, tolerated this combination well, no significant GI side effects.  His joint symptoms have improved mild discomfort in the PIPs with activity.  Psoriasis is resolved.  Recent labs are within acceptable range.  Continue to manage osteoarthritis is now avoid ibuprofen, corticosteroid injection into the knee on the right side was helpful, may be repeated in future as needed.  We will meet here in 3 months labs just prior.       Diagnoses and all orders for this visit:    Psoriatic arthritis (H)  -     leflunomide (ARAVA) 20 MG tablet; Take 1 tablet (20 mg total) by mouth daily.  Dispense: 90 tablet; Refill: 0  -     apremilast (OTEZLA) 30 mg Tab; Take 1 tablet (30 mg total) by mouth 2 (two) times a day.  Dispense: 60 tablet; Refill: 11    Primary osteoarthritis involving multiple joints    High risk medication use    Psoriasis      HISTORY OF PRESENTING ILLNESS:  Kai Jim, 59 y.o., male is here for follow-up.  Has psoriatic arthritis.  He has psoriasis.  Osteoarthritis.  Doing great with the current combination of Otezla and leflunomide.  He noted pain level to be 0.5/10.  Able to do most of his day-to-day activities without difficulty.  The knee pain is now returned to the same severity.  He has noted no recurrence of swelling.  He noted that the psoriatic lesions such as on the olecranon areas have resolved.  There is no stiffness in the morning.  He had his labs drawn recently which are within acceptable range.   There were no significant GI side effects associated with Otezla, no precipitation of depression. Used to be on Enbrel for several years before insurance dropped to the years ago.   .  He has  family history of rheumatoid arthritis in father.   He is not a smoker alcohol occasionally.   He injured his right index and middle finger chopping wood many years ago.   That was in .  Further historical information and ADL limitations as noted in the multidimensional health assessment questionnaire attached in the EMR.    Following is the excerpt from a recent note:      polyarthralgias.  Joints affected include the right hip(s) and the right knee(s). This has gone on for several months ago. Pain is described as sharp. It is when active and at night/ wakes from sleep at night.  His symptoms are moderate, severe. The symptoms are gradually worsening. Symptoms include tenderness to touch, limited range of motion.  Treatment to date has been without significant relief.  He has a long-standing history of psoriasis associated with psoriatic arthritis.  He was in his 30s when he had psoriasis.  He used to follow-up with Dr. Ervin salmeron.  He has been on Enbrel which is controlled rest of his joint symptoms quite nicely occasional back discomfort.  If he comes out of his truck in a certain way.  He has been documented to have lumbar spondylosis and sacroiliac degenerative changes on MRIs in the past.  He has undergone physical therapy.  He takes Mobic for that.  He noted pain in the right hip sometimes radiates down his leg.  He has had difficulty performing some of the day-to-day activities.  He had morning stiffness of 10 minutes. Rest of the 13 system ROS is negative.     ALLERGIES:Patient has no known allergies.    PAST MEDICAL/ACTIVE PROBLEMS/MEDICATION/ FAMILY HISTORY/SOCIAL DATA:  The patient has a family history of  Past Medical History:   Diagnosis Date     Psoriatic arthritis (H) 2016     Social History     Tobacco Use   Smoking Status Former Smoker     Types: Cigars     Last attempt to quit: 3/12/2019     Years since quittin.9   Smokeless Tobacco Never Used     Patient Active Problem List   Diagnosis     Psoriatic arthritis (H)     High risk medication use     Chronic pain of right knee     Primary osteoarthritis involving multiple joints     Degenerative joint  "disease of sacroiliac joint     Current Outpatient Medications   Medication Sig Dispense Refill     apremilast (OTEZLA) 30 mg Tab Take 30 mg by mouth 2 (two) times a day. 60 tablet 2     calcipotriene (DOVONOX) 0.005 % ointment        clobetasol (TEMOVATE) 0.05 % ointment        leflunomide (ARAVA) 20 MG tablet TAKE 1 TABLET(20 MG) BY MOUTH DAILY 60 tablet 0     multivitamin with minerals (THERA-M) 9 mg iron-400 mcg Tab tablet Take 1 tablet by mouth daily.       omega-3/dha/epa/fish oil (FISH OIL-OMEGA-3 FATTY ACIDS) 300-1,000 mg capsule Take 2 g by mouth daily.       leflunomide (ARAVA) 20 MG tablet Take 1 tablet (20 mg total) by mouth daily. 90 tablet 0     No current facility-administered medications for this visit.      DETAILED EXAMINATION  02/13/20  :  Vitals:    02/13/20 0819   BP: 136/84   Patient Site: Right Arm   Patient Position: Sitting   Cuff Size: Adult Regular   Weight: 220 lb (99.8 kg)   Height: 5' 11.75\" (1.822 m)     Alert oriented. Head including the face is examined for malar rash, heliotropes, scarring, lupus pernio. Eyes examined for redness such as in episcleritis/scleritis, periorbital lesions.   Neck/ Face examined for parotid gland swelling, range of motion of neck.  Left upper and lower and right upper and lower extremities examined for tenderness, swelling, warmth of the appendicular joints, range of motion, edema, rash.  Some of the important findings included: He does not have synovitis of palpable joints of upper extremities no dactylitis of the digits, effusion warmth or JLT absent from the knees.  The olecranon areas without residual psoriatic lesions.  Right index and middle finger amputation.     LAB / IMAGING DATA:  ALT   Date Value Ref Range Status   02/10/2020 25 0 - 45 U/L Final   11/12/2019 35 0 - 45 U/L Final   08/05/2019 27 0 - 45 U/L Final     Albumin   Date Value Ref Range Status   02/10/2020 3.8 3.5 - 5.0 g/dL Final   11/12/2019 3.7 3.5 - 5.0 g/dL Final   08/05/2019 3.7 " 3.5 - 5.0 g/dL Final     Creatinine   Date Value Ref Range Status   02/10/2020 0.96 0.70 - 1.30 mg/dL Final   11/12/2019 0.87 0.70 - 1.30 mg/dL Final   08/05/2019 0.83 0.70 - 1.30 mg/dL Final       WBC   Date Value Ref Range Status   02/10/2020 7.5 4.0 - 11.0 thou/uL Final   11/12/2019 7.4 4.0 - 11.0 thou/uL Final     Hemoglobin   Date Value Ref Range Status   02/10/2020 15.1 14.0 - 18.0 g/dL Final   11/12/2019 15.3 14.0 - 18.0 g/dL Final   08/05/2019 14.7 14.0 - 18.0 g/dL Final     Platelets   Date Value Ref Range Status   02/10/2020 265 140 - 440 thou/uL Final   11/12/2019 265 140 - 440 thou/uL Final   08/05/2019 258 140 - 440 thou/uL Final       Lab Results   Component Value Date    SEDRATE 15 04/17/2018

## 2021-06-08 NOTE — PROGRESS NOTES
"Kai Jim is a 59 y.o. male who is being evaluated via a billable telephone visit.      The patient has been notified of following:     \"This telephone visit will be conducted via a call between you and your physician/provider. We have found that certain health care needs can be provided without the need for a physical exam.  This service lets us provide the care you need with a short phone conversation.  If a prescription is necessary we can send it directly to your pharmacy.  If lab work is needed we can place an order for that and you can then stop by our lab to have the test done at a later time.    Telephone visits are billed at different rates depending on your insurance coverage. During this emergency period, for some insurers they may be billed the same as an in-person visit.  Please reach out to your insurance provider with any questions.    If during the course of the call the physician/provider feels a telephone visit is not appropriate, you will not be charged for this service.\"    Patient has given verbal consent to a Telephone visit? Yes    What phone number would you like to be contacted at? [phone]     Patient would like to receive their AVS by AVS Preference: Shira.      ASSESSMENT AND PLAN:    Diagnoses and all orders for this visit:    Psoriatic arthritis (H)  -     leflunomide (ARAVA) 20 MG tablet; Take 1 tablet (20 mg total) by mouth daily.  Dispense: 90 tablet; Refill: 0    Primary osteoarthritis involving multiple joints    Chronic pain of right knee    High risk medication use    Psoriasis          HISTORY OF PRESENTING ILLNESS:  Kai Jim 59 y.o. is evaluated here via video link.via phone link.  Originally planned for video but is phone operating system was recently updated and he is unable to access the camera.  He reports stomach symptoms he had diarrhea, abdominal discomfort.  It was thought that this might be from Otezla when he saw his primary physician.  This is gone " on for the past 2 to 3 weeks.  He has been on Otezla for the past several months.  He was asked to discontinue this.  He has noted pain.  This is in his right knee.  This is worse with activity.  This sometimes troubles him at night.  There is no swelling no radiation.  This is the knee he recalls aspiration from.  We discussed the management of osteoarthritis over-the-counter measures outlined, he can come in if the pain in the knees/that this is interfering with day-to-day activity and we could do a corticosteroid injection.  Otherwise we will meet here in 3 months.. ROS enquiry held for fever, ocular symptoms, rash, headache,  GI issues.  Today we also discussed the issues related to the current pandemic, the pros and cons of the current treatment plan, the CDC guidelines such as social distancing washing the hands covering the cough. ALLERGIES:Otezla [apremilast]    PAST MEDICAL/ACTIVE PROBLEMS/MEDICATION/SOCIAL DATA  Past Medical History:   Diagnosis Date     Psoriatic arthritis (H) 2016     Social History     Tobacco Use   Smoking Status Former Smoker     Types: Cigars     Last attempt to quit: 3/12/2019     Years since quittin.1   Smokeless Tobacco Never Used     Patient Active Problem List   Diagnosis     Psoriatic arthritis (H)     High risk medication use     Chronic pain of right knee     Primary osteoarthritis involving multiple joints     Degenerative joint disease of sacroiliac joint     Psoriasis     Current Outpatient Medications   Medication Sig Dispense Refill     calcipotriene (DOVONOX) 0.005 % ointment        clobetasol (TEMOVATE) 0.05 % ointment        multivitamin with minerals (THERA-M) 9 mg iron-400 mcg Tab tablet Take 1 tablet by mouth daily.       omega-3/dha/epa/fish oil (FISH OIL-OMEGA-3 FATTY ACIDS) 300-1,000 mg capsule Take 2 g by mouth daily.       apremilast (OTEZLA) 30 mg Tab Take 1 tablet (30 mg total) by mouth 2 (two) times a day. 60 tablet 11     leflunomide (ARAVA) 20 MG  tablet Take 1 tablet (20 mg total) by mouth daily. 90 tablet 0     No current facility-administered medications for this visit.          EXAMINATION: Phone visit    LAB / IMAGING DATA:  ALT   Date Value Ref Range Status   05/18/2020 25 0 - 45 U/L Final   02/10/2020 25 0 - 45 U/L Final   11/12/2019 35 0 - 45 U/L Final     Albumin   Date Value Ref Range Status   05/18/2020 3.6 3.5 - 5.0 g/dL Final   02/10/2020 3.8 3.5 - 5.0 g/dL Final   11/12/2019 3.7 3.5 - 5.0 g/dL Final     Creatinine   Date Value Ref Range Status   05/18/2020 0.89 0.70 - 1.30 mg/dL Final   02/10/2020 0.96 0.70 - 1.30 mg/dL Final   11/12/2019 0.87 0.70 - 1.30 mg/dL Final       WBC   Date Value Ref Range Status   05/18/2020 8.0 4.0 - 11.0 thou/uL Final   02/10/2020 7.5 4.0 - 11.0 thou/uL Final     Hemoglobin   Date Value Ref Range Status   05/18/2020 15.0 14.0 - 18.0 g/dL Final   02/10/2020 15.1 14.0 - 18.0 g/dL Final   11/12/2019 15.3 14.0 - 18.0 g/dL Final     Platelets   Date Value Ref Range Status   05/18/2020 226 140 - 440 thou/uL Final   02/10/2020 265 140 - 440 thou/uL Final   11/12/2019 265 140 - 440 thou/uL Final       Lab Results   Component Value Date    SEDRATE 15 04/17/2018     Duration of the call:8  Minutes  Call start: 132  pm  Call end:   141pm

## 2021-06-08 NOTE — TELEPHONE ENCOUNTER
Staff have called pt many times but no call back from pt. Appt today is canceled.    Earlene Coulter CMA [facility] 5/14/2020 8:14 AM

## 2021-06-09 NOTE — PROGRESS NOTES
ASSESSMENT AND PLAN:  Kai Jim 56 y.o. male for establishment of care for psoriatic arthritis for which he is on Enbrel, complaining of increasing pain in his knee especially on the right side.  He has trochanteric bursitis bilaterally.  He has features suggestive of osteoarthritis of the right knee.  X-rays of the knees were taken today.  Personally reviewed, findings: Reduced joint spaces especially medial on the right side with osteophyte formation that showed it to him.  He would like to try local injection with corticosteroid 40 mg of Kenalog was injected into his right knee.  He is going to  Diagnoses and all orders for this visit:    Chronic pain of right knee  -     HM1(CBC and Differential); Standing  -     Creatinine; Standing  -     ALT (SGPT); Standing  -     Albumin; Standing  -     C-Reactive Protein  -     Erythrocyte Sedimentation Rate  -     XR Knees Bilateral 1 Or 2 VWS; Future; Expected date: 3/16/17  -     triamcinolone acetonide 40 mg/mL injection 40 mg (KENALOG-40); Inject 1 mL (40 mg total) into the joint once.  -     HM1(CBC and Differential)  -     Creatinine  -     ALT (SGPT)  -     Albumin  -     HM1 (CBC with Diff)    Psoriatic arthritis  -     HM1(CBC and Differential); Standing  -     Creatinine; Standing  -     ALT (SGPT); Standing  -     Albumin; Standing  -     C-Reactive Protein  -     Erythrocyte Sedimentation Rate  -     XR Knees Bilateral 1 Or 2 VWS; Future; Expected date: 3/16/17  -     HM1(CBC and Differential)  -     Creatinine  -     ALT (SGPT)  -     Albumin  -     HM1 (CBC with Diff)    High risk medication use  -     HM1(CBC and Differential); Standing  -     Creatinine; Standing  -     ALT (SGPT); Standing  -     Albumin; Standing  -     HM1(CBC and Differential)  -     Creatinine  -     ALT (SGPT)  -     Albumin  -     HM1 (CBC with Diff)    Bilateral primary osteoarthritis of knee  -     triamcinolone acetonide 40 mg/mL injection 40 mg (KENALOG-40); Inject 1 mL  (40 mg total) into the joint once.      HISTORY OF PRESENTING ILLNESS:  Kai Jim, 56 y.o., male is here for polyarthralgias.  Joints affected include the right hip(s) and the right knee(s). This has gone on for several months ago. Pain is described as sharp. It is when active and at night/ wakes from sleep at night.  His symptoms are moderate, severe. The symptoms are gradually worsening. Symptoms include tenderness to touch, limited range of motion.  Treatment to date has been without significant relief.  He has a long-standing history of psoriasis associated with psoriatic arthritis.  He was in his 30s when he had psoriasis.  He used to follow-up with Dr. Ervin salmeron.  He has been on Enbrel which is controlled rest of his joint symptoms quite nicely occasional back discomfort.  If he comes out of his truck in a certain way.  He has been documented to have lumbar spondylosis and sacroiliac degenerative changes on MRIs in the past.  He has undergone physical therapy.  He takes Mobic for that.  He noted pain in the right hip sometimes radiates down his leg.  He has had difficulty performing some of the day-to-day activities.  He had morning stiffness of 10 minutes.  He has no family history of rheumatoid arthritis in father.  His positive medicines.  He is not a smoker alcohol occasionally.  He has no fever weight loss blurry vision eye redness mild loss of nausea cough no new rash.  He injured his right index and middle finger chopping wood many years ago.  That was in 1986.  Further historical information and ADL limitations as noted in the multidimensional health assessment questionnaire attached in the EMR. Rest of the 13 system ROS is negative.     ALLERGIES:Review of patient's allergies indicates no known allergies.    PAST MEDICAL/ACTIVE PROBLEMS/MEDICATION/ FAMILY HISTORY/SOCIAL DATA:  The patient has a family history of  Past Medical History:   Diagnosis Date     Psoriatic arthritis 1/8/2016  "    History   Smoking Status     Light Tobacco Smoker     Types: Cigars   Smokeless Tobacco     Not on file     Patient Active Problem List   Diagnosis     Psoriatic arthritis     High risk medication use     Current Outpatient Prescriptions   Medication Sig Dispense Refill     calcipotriene (DOVONOX) 0.005 % ointment        clobetasol (TEMOVATE) 0.05 % ointment        cyclobenzaprine (FLEXERIL) 5 MG tablet        ENBREL SURECLICK 50 mg/mL (0.98 mL) PnIj   2     FLULAVAL QUAD 3793-2975 60 mcg (15 mcg x 4)/0.5 mL Susp ADM 0.5ML IM UTD  0     No current facility-administered medications for this visit.        COMPREHENSIVE EXAMINATION:  Vitals:    03/16/17 0909   Weight: (!) 233 lb 8 oz (105.9 kg)   Height: 5' 10.38\" (1.788 m)     A well appearing alert oriented male. Vital data as noted above. His eyes without inflammation/scleromalacia. ENTwithout oral mucositis, thrush, nasal deformity, external ear redness, deformity. His neck is without lymphadenopathy and supple. Lungs normal sounds, no pleural rub. Heart auscultation normal rate, rhythm; no pericardial rub and murmurs. Abdomen soft, non tender, no organomegaly. Skin examined for heliotrope, malar area eruption, lupus pernio, periungual erythema, sclerodactyly, papules, erythema nodosum, purpura, nail pitting, onycholysis, and obvious psoriasis lesion. Neurological examination shows normal alertness, speech, facial symmetry, tone and power in upper and lower extremities, Tinel's and Phalen's at wrist and gait. The joint examination is performed for swelling, tenderness, warmth, erythema, and range of motion in the following joints: DIPs, PIPs, MCPs, wrists, first CMC's, elbows, shoulders, hips, knees, ankles, feet; spine for range of motion and paraspinal muscles for tenderness. The salient normal / abnormal findings are appended.  He has warmth of the right knee.  Tenderness in the trochanteric areas.  He does not have dactylitis.  He does not have enthesitis " apart from the trochanteric area.  His right index and middle finger are amputated at distal to the PIPs.    LAB / IMAGING DATA:  ALT   Date Value Ref Range Status   01/08/2016 24 12 - 78 U/L Final     ALBUMIN   Date Value Ref Range Status   01/08/2016 3.7 3.5 - 5.0 g/dL Final     CREATININE   Date Value Ref Range Status   01/08/2016 0.99 0.70 - 1.30 mg/dL Final     Comment:       New Creatinine method with new reference ranges as of 9/14/15       WBC   Date Value Ref Range Status   01/15/2016 6.9 4.0 - 11.0 thou/uL Final   01/08/2016 7.9 4.0 - 11.0 thou/uL Final     HEMOGLOBIN   Date Value Ref Range Status   01/15/2016 15.4 14.0 - 18.0 g/dL Final   01/08/2016 14.9 14.0 - 18.0 g/dL Final     PLATELETS   Date Value Ref Range Status   01/15/2016 264 140 - 440 thou/uL Final   01/08/2016 300 140 - 440 thou/uL Final       Lab Results   Component Value Date    SEDRATE 7 01/08/2016

## 2021-06-11 NOTE — PROGRESS NOTES
1st attempt   LVMTCB- to go through rooming questions. Will try again later     Earlene Coulter CMA [facility] 9/17/2020 10:23 AM

## 2021-06-11 NOTE — PROGRESS NOTES
"Kai Jim is a 60 y.o. male who is being evaluated via a billable telephone visit.      The patient has been notified of following:     \"This telephone visit will be conducted via a call between you and your physician/provider. We have found that certain health care needs can be provided without the need for a physical exam.  This service lets us provide the care you need with a short phone conversation.  If a prescription is necessary we can send it directly to your pharmacy.  If lab work is needed we can place an order for that and you can then stop by our lab to have the test done at a later time.    Telephone visits are billed at different rates depending on your insurance coverage. During this emergency period, for some insurers they may be billed the same as an in-person visit.  Please reach out to your insurance provider with any questions.    If during the course of the call the physician/provider feels a telephone visit is not appropriate, you will not be charged for this service.\"    Patient has given verbal consent to a Telephone visit? Yes    What phone number would you like to be contacted at? [phone]     Patient would like to receive their AVS by AVS Preference: Shira.      ASSESSMENT AND PLAN:    Diagnoses and all orders for this visit:    Psoriatic arthritis (H)    Primary osteoarthritis involving multiple joints    Psoriasis    High risk medication use          HISTORY OF PRESENTING ILLNESS:  Kai Jim 60 y.o. is evaluated here via phone link.  This is for follow-up.       for video but is phone operating system was recently updated and he is unable to access the camera.  He reports stomach symptoms he had diarrhea, abdominal discomfort.  It was thought that this might be from Otezla when he saw his primary physician.  This is gone on for the past 2 to 3 weeks.  He has been on Otezla for the past several months.  He was asked to discontinue this.  He has noted pain.  This is in his " right knee.  This is worse with activity.  This sometimes troubles him at night.  There is no swelling no radiation.  This is the knee he recalls aspiration from.  We discussed the management of osteoarthritis over-the-counter measures outlined, he can come in if the pain in the knees/that this is interfering with day-to-day activity and we could do a corticosteroid injection.  He sustained a course with leflunomide, Otezla he had questions around immunosuppression most recent data were shared, I will meet in 3 months.    ROS enquiry held for fever, ocular symptoms, rash, headache,  GI issues.  Today we also discussed the issues related to the current pandemic, the pros and cons of the current treatment plan, the CDC guidelines such as social distancing washing the hands covering the cough.  ALLERGIES:Otezla [apremilast]    PAST MEDICAL/ACTIVE PROBLEMS/MEDICATION/SOCIAL DATA  Past Medical History:   Diagnosis Date     Psoriatic arthritis (H) 2016     Social History     Tobacco Use   Smoking Status Former Smoker     Types: Cigars     Last attempt to quit: 3/12/2019     Years since quittin.5   Smokeless Tobacco Never Used     Patient Active Problem List   Diagnosis     Psoriatic arthritis (H)     High risk medication use     Chronic pain of right knee     Primary osteoarthritis involving multiple joints     Degenerative joint disease of sacroiliac joint     Psoriasis     Current Outpatient Medications   Medication Sig Dispense Refill     apremilast (OTEZLA) 30 mg Tab Take 1 tablet (30 mg total) by mouth 2 (two) times a day. 60 tablet 11     calcipotriene (DOVONOX) 0.005 % ointment        clobetasol (TEMOVATE) 0.05 % ointment        leflunomide (ARAVA) 20 MG tablet TAKE 1 TABLET(20 MG) BY MOUTH DAILY 90 tablet 0     multivitamin with minerals (THERA-M) 9 mg iron-400 mcg Tab tablet Take 1 tablet by mouth daily.       omega-3/dha/epa/fish oil (FISH OIL-OMEGA-3 FATTY ACIDS) 300-1,000 mg capsule Take 2 g by mouth  daily.       No current facility-administered medications for this visit.          EXAMINATION:    : He sounded comfortable, alert, oriented, speech fluent.  His memory and recall were intact.      LAB / IMAGING DATA:  ALT   Date Value Ref Range Status   08/17/2020 25 0 - 45 U/L Final   05/18/2020 25 0 - 45 U/L Final   02/10/2020 25 0 - 45 U/L Final     Albumin   Date Value Ref Range Status   08/17/2020 3.8 3.5 - 5.0 g/dL Final   05/18/2020 3.6 3.5 - 5.0 g/dL Final   02/10/2020 3.8 3.5 - 5.0 g/dL Final     Creatinine   Date Value Ref Range Status   08/17/2020 0.88 0.70 - 1.30 mg/dL Final   05/18/2020 0.89 0.70 - 1.30 mg/dL Final   02/10/2020 0.96 0.70 - 1.30 mg/dL Final       WBC   Date Value Ref Range Status   08/17/2020 9.2 4.0 - 11.0 thou/uL Final   05/18/2020 8.0 4.0 - 11.0 thou/uL Final     Hemoglobin   Date Value Ref Range Status   08/17/2020 14.4 14.0 - 18.0 g/dL Final   05/18/2020 15.0 14.0 - 18.0 g/dL Final   02/10/2020 15.1 14.0 - 18.0 g/dL Final     Platelets   Date Value Ref Range Status   08/17/2020 231 140 - 440 thou/uL Final   05/18/2020 226 140 - 440 thou/uL Final   02/10/2020 265 140 - 440 thou/uL Final       Lab Results   Component Value Date    SEDRATE 15 04/17/2018     Duration of the call:9  Minutes  Call start: 1120  am  Call end:   1129 am

## 2021-06-13 NOTE — PROGRESS NOTES
1st attempt   LVMT[facility]- to go through rooming questions. Will try again later     Earlene Coulter CMA [facility] 12/21/2020 8:38 AM

## 2021-06-13 NOTE — PROGRESS NOTES
"Kai Jim is a 60 y.o. male who is being evaluated via a billable telephone visit.      The patient has been notified of following:     \"This telephone visit will be conducted via a call between you and your physician/provider. We have found that certain health care needs can be provided without the need for a physical exam.  This service lets us provide the care you need with a short phone conversation.  If a prescription is necessary we can send it directly to your pharmacy.  If lab work is needed we can place an order for that and you can then stop by our lab to have the test done at a later time.    Telephone visits are billed at different rates depending on your insurance coverage. During this emergency period, for some insurers they may be billed the same as an in-person visit.  Please reach out to your insurance provider with any questions.    If during the course of the call the physician/provider feels a telephone visit is not appropriate, you will not be charged for this service.\"    Patient has given verbal consent to a Telephone visit? Yes    What phone number would you like to be contacted at? [phone]    Patient would like to receive their AVS by AVS Preference: Shira.     This document was created using a software with less than 100% fidelity, at times resulting in unintended, even erroneous syntax and grammar.  The reader is advised to keep this under consideration while reviewing, interpreting this note.    ASSESSMENT AND PLAN:    Diagnoses and all orders for this visit:    Psoriatic arthritis (H)  -     leflunomide (ARAVA) 20 MG tablet; Take 1 tablet (20 mg total) by mouth daily.  Dispense: 90 tablet; Refill: 0    Primary osteoarthritis involving multiple joints    High risk medication use    Psoriasis          HISTORY OF PRESENTING ILLNESS:  Kai Jim 60 y.o. is evaluated here via video link.for follow-up.  He has psoriatic arthritis.  He has psoriasis.  He has widespread " osteoarthritis.  Doing great with the current combination of Otezla and leflunomide 20 mg daily.  He noted pain level to be 0.5/10.  No residual GI symptoms.  His recent labs are entirely normal.  During her recent visit he had corticosteroid injection in his knee that is done well.   With Otezla no precipitation of depression. Used to be on Enbrel for several years before insurance dropped to the years ago.  He has  family history of rheumatoid arthritis in father.   He is not a smoker alcohol occasionally.   He injured his right index and middle finger chopping wood many years ago.  That was in .   ROS enquiry held for fever, ocular symptoms, rash, headache,  GI issues.  Today we also discussed the issues related to the current pandemic, the pros and cons of the current treatment plan, the CDC guidelines such as social distancing washing the hands covering the cough.  ALLERGIES:Patient has no known allergies.    PAST MEDICAL/ACTIVE PROBLEMS/MEDICATION/SOCIAL DATA  Past Medical History:   Diagnosis Date     Psoriatic arthritis (H) 2016     Social History     Tobacco Use   Smoking Status Former Smoker     Types: Cigars     Quit date: 3/12/2019     Years since quittin.7   Smokeless Tobacco Never Used     Patient Active Problem List   Diagnosis     Psoriatic arthritis (H)     High risk medication use     Chronic pain of right knee     Primary osteoarthritis involving multiple joints     Degenerative joint disease of sacroiliac joint (H)     Psoriasis     Left inguinal hernia     Current Outpatient Medications   Medication Sig Dispense Refill     apremilast (OTEZLA) 30 mg Tab Take 1 tablet (30 mg total) by mouth 2 (two) times a day. 60 tablet 11     HYDROcodone-acetaminophen 5-325 mg per tablet Take 1-2 tablets by mouth every 6 (six) hours as needed. 18 tablet 0     leflunomide (ARAVA) 20 MG tablet Take 1 tablet (20 mg total) by mouth daily. 30 tablet 0     multivitamin with minerals (THERA-M) 9 mg iron-400  mcg Tab tablet Take 1 tablet by mouth daily.       omega-3/dha/epa/fish oil (FISH OIL-OMEGA-3 FATTY ACIDS) 300-1,000 mg capsule Take 2 g by mouth daily.       No current facility-administered medications for this visit.          EXAMINATION: He sounded comfortable alert oriented, speech was fluent, did not sound like he was in pain.      LAB / IMAGING DATA:  ALT   Date Value Ref Range Status   12/15/2020 20 0 - 45 U/L Final   08/17/2020 25 0 - 45 U/L Final   05/18/2020 25 0 - 45 U/L Final     Albumin   Date Value Ref Range Status   12/15/2020 3.9 3.5 - 5.0 g/dL Final   08/17/2020 3.8 3.5 - 5.0 g/dL Final   05/18/2020 3.6 3.5 - 5.0 g/dL Final     Creatinine   Date Value Ref Range Status   12/15/2020 0.92 0.70 - 1.30 mg/dL Final   08/17/2020 0.88 0.70 - 1.30 mg/dL Final   05/18/2020 0.89 0.70 - 1.30 mg/dL Final       WBC   Date Value Ref Range Status   12/15/2020 7.4 4.0 - 11.0 thou/uL Final   08/17/2020 9.2 4.0 - 11.0 thou/uL Final     Hemoglobin   Date Value Ref Range Status   12/15/2020 14.9 14.0 - 18.0 g/dL Final   08/17/2020 14.4 14.0 - 18.0 g/dL Final   05/18/2020 15.0 14.0 - 18.0 g/dL Final     Platelets   Date Value Ref Range Status   12/15/2020 271 140 - 440 thou/uL Final   08/17/2020 231 140 - 440 thou/uL Final   05/18/2020 226 140 - 440 thou/uL Final       Lab Results   Component Value Date    SEDRATE 15 04/17/2018     Duration of the call:6  Minutes  Call start: 947  am  Call end:   953am

## 2021-06-13 NOTE — ANESTHESIA CARE TRANSFER NOTE
Last vitals:   Vitals:    12/17/20 0844   BP: 133/66   Pulse: 77   Resp: 10   Temp: 36.6  C (97.9  F)   SpO2:      Patient's level of consciousness is drowsy  Spontaneous respirations: yes  Maintains airway independently: yes  Dentition unchanged: yes  Oropharynx: oropharynx clear of all foreign objects    QCDR Measures:  ASA# 20 - Surgical Safety Checklist: WHO surgical safety checklist completed prior to induction    PQRS# 430 - Adult PONV Prevention: 4558F - Pt received => 2 anti-emetic agents (different classes) preop & intraop  ASA# 8 - Peds PONV Prevention: 4558F - Pt received => 2 anti-emetic agents (different classes) preop & intraop  PQRS# 424 - Nora-op Temp Management: 4559F - At least one body temp DOCUMENTED => 35.5C or 95.9F within required timeframe  PQRS# 426 - PACU Transfer Protocol: - Transfer of care checklist used  ASA# 14 - Acute Post-op Pain: ASA14B - Patient did NOT experience pain >= 7 out of 10

## 2021-06-13 NOTE — ANESTHESIA POSTPROCEDURE EVALUATION
Patient: Kai Jim  Procedure(s):  HERNIORRHAPHY, INGUINAL, LAPAROSCOPIC (Left)  Anesthesia type: general    Patient location: Phase II Recovery  Last vitals:   Vitals Value Taken Time   /75 12/17/20 1015   Temp 36.5  C (97.7  F) 12/17/20 0847   Pulse 66 12/17/20 1020   Resp 16 12/17/20 0935   SpO2 97 % 12/17/20 1020   Vitals shown include unvalidated device data.  Post vital signs: stable  Level of consciousness: awake and responds to simple questions  Post-anesthesia pain: pain controlled  Post-anesthesia nausea and vomiting: no  Pulmonary: unassisted, return to baseline  Cardiovascular: stable and blood pressure at baseline  Hydration: adequate  Anesthetic events: no    QCDR Measures:  ASA# 11 - Nora-op Cardiac Arrest: ASA11B - Patient did NOT experience unanticipated cardiac arrest  ASA# 12 - Nora-op Mortality Rate: ASA12B - Patient did NOT die  ASA# 13 - PACU Re-Intubation Rate: ASA13B - Patient did NOT require a new airway mgmt  ASA# 10 - Composite Anes Safety: ASA10A - No serious adverse event    Additional Notes:

## 2021-06-14 NOTE — PROGRESS NOTES
"The patient has been notified of following:     \"This telephone visit will be conducted via a call between you and your physician/provider. We have found that certain health care needs can be provided without the need for a physical exam.  This service lets us provide the care you need with a short phone conversation.  If a prescription is necessary we can send it directly to your pharmacy.  If lab work is needed we can place an order for that and you can then stop by our lab to have the test done at a later time.    Telephone visits are billed at different rates depending on your insurance coverage. During this emergency period, for some insurers they may be billed the same as an in-person visit.  Please reach out to your insurance provider with any questions.    If during the course of the call the physician/provider feels a telephone visit is not appropriate, you will not be charged for this service.\"    Patient has given verbal consent to a Telephone visit? Yes    What phone number would you like to be contacted at? home    Patient would like to receive their AVS by AVS Preference: Shira.   HPI 60-year-old male underwent laparoscopic left inguinal hernia repair with Dr. Julian on 12/17/2020.  He states he is doing very well.  He has no questions or concerns.  He has no pain.  No swelling or erythema.  Incisions to him look good.      There were no vitals taken for this visit.    EXAM:  NA  Assessment/Plan: . Doing well after surgery and should follow up as needed.  Chelsi Kaur PA-C  [facility] Department of Surgery    "

## 2021-06-16 NOTE — TELEPHONE ENCOUNTER
Telephone Encounter by Jennifer Roca at 8/7/2019 12:08 PM     Author: Jennifer Roca Service: -- Author Type: Financial Resource Guide    Filed: 8/7/2019 12:23 PM Encounter Date: 8/7/2019 Status: Addendum    : Jennifer Roca (Financial Resource Guide)    Related Notes: Original Note by Jennifer Roca (Financial Resource Guide) filed at 8/7/2019 12:13 PM       Medication: Otezla Starter and Maintenance Doses  Qty: 55 tabs for 28 days and 60 tabs for 30 days  Effective Dates: 08/07/2019 - 08/07/2021  Pharmacy: Restricted to [facility]  Copay Amount: Unknown  Copay Assistance: sent [employer] message explaining $0 copay card program  Patient Notified? Yes, LM

## 2021-06-16 NOTE — TELEPHONE ENCOUNTER
Telephone Encounter by Jennifer Roca at 1/29/2020  7:40 AM     Author: Jennifer Roca Service: -- Author Type: Financial Resource Guide    Filed: 1/29/2020  7:43 AM Encounter Date: 1/29/2020 Status: Signed    : Jennifer Roca (Financial Resource Guide)       Medication: Otezla 30mg  Qty: 60 tabs for 30 days  Insurance: [employer]  Test Claim: refill too soon until 02/14/2020  Pharmacy: restricted to [facility]  Additional Information: rx did not stop in my [facility]

## 2021-06-16 NOTE — TELEPHONE ENCOUNTER
Telephone Encounter by Jennifer Roca at 2/13/2020 11:20 AM     Author: Jennifer Roca Service: -- Author Type: Financial Resource Guide    Filed: 2/13/2020 11:29 AM Encounter Date: 2/13/2020 Status: Signed    : Jennifer Roca (Financial Resource Guide)       Medication: Otezla 30mg  Qty: 60 tabs for 30 days  Insurance: [employer]  Test Claim: refill too soon until 02/21/2020  Copay Assistance: copay card on file at pharmacy  Pharmacy: Restricted to [facility]  Additional Information: PA expires 08/07/2021

## 2021-06-16 NOTE — TELEPHONE ENCOUNTER
Telephone Encounter by Jennifer Roca at 11/13/2019  8:41 AM     Author: Jennifer Roca Service: -- Author Type: Financial Resource Guide    Filed: 11/13/2019  8:43 AM Encounter Date: 11/13/2019 Status: Signed    : Jennifer Roca (Financial Resource Guide)       Medication: Otezla 30mg  Qty: 60 tabs for 30 days  Insurance: [employer]  Test Claim: Product/service not appropriate at this location  Pharmacy: Restricted to [facility]  Additional Information: PA expires on 08/07/2021

## 2021-06-16 NOTE — TELEPHONE ENCOUNTER
Telephone Encounter by Jennifer Roca at 8/8/2019  9:06 AM     Author: Jennifer Roca Service: -- Author Type: Financial Resource Guide    Filed: 8/8/2019  9:07 AM Encounter Date: 8/7/2019 Status: Signed    : Jennifer Roca (Financial Resource Guide)

## 2021-06-16 NOTE — TELEPHONE ENCOUNTER
Telephone Encounter by Jennifer Roca at 8/7/2019  8:48 AM     Author: Jennifer Roca Service: -- Author Type: Financial Resource Guide    Filed: 8/7/2019  8:50 AM Encounter Date: 8/7/2019 Status: Signed    : Jennifer Roca (Financial Resource Guide)       Medication: Otezla Starter dose and Maintenance dose  QTY: 55 tabs for 28 days and 60 tabs for 30 days  Insurance: [employer]  Pharmacy: will be restricted to [facility] if PA approved

## 2021-06-16 NOTE — PROGRESS NOTES
Kai Jim is a 60 y.o. male who is being evaluated via a billable telephone visit.      What phone number would you like to be contacted at? [phone]  How would you like to obtain your AVS? AVS Preference: Shira.  Phone call duration: 6 minutes  Labs every 3 months.      This document was created using a software with less than 100% fidelity, at times resulting in unintended, even erroneous syntax and grammar.  The reader is advised to keep this under consideration while reviewing, interpreting this note.           ASSESSMENT AND PLAN:    Diagnoses and all orders for this visit:    Psoriatic arthritis (H)  -     leflunomide (ARAVA) 20 MG tablet; Take 1 tablet (20 mg total) by mouth daily.  Dispense: 90 tablet; Refill: 0    Primary osteoarthritis involving multiple joints    Degenerative joint disease of sacroiliac joint (H)    Psoriasis    High risk medication use        Follow up in 6 months      HISTORY OF PRESENTING ILLNESS:  Kai Jim 60 y.o. is evaluated here via phone  link. He has psoriatic arthritis.  He has psoriasis.  He has widespread osteoarthritis.  Doing great with the current combination of Otezla and leflunomide 20 mg daily.  He noted pain level to be 0.5/10.  No residual GI symptoms.  His recent labs are entirely normal.  During her recent visit he had corticosteroid injection in his knee that is done well.   With Otezla no precipitation of depression. Used to be on Enbrel for several years before insurance dropped to the years ago.  He has  family history of rheumatoid arthritis in father.   He is not a smoker alcohol occasionally.   He injured his right index and middle finger chopping wood many years ago.  That was in 1986.         ROS enquiry held for fever, ocular symptoms, rash, headache,  GI issues.  Today we also discussed the issues related to the current pandemic, the pros and cons of the current treatment plan, the CDC guidelines such as social distancing washing the hands  covering the cough.  ALLERGIES:Patient has no known allergies.    PAST MEDICAL/ACTIVE PROBLEMS/MEDICATION/SOCIAL DATA  Past Medical History:   Diagnosis Date     Psoriatic arthritis (H) 2016     Social History     Tobacco Use   Smoking Status Former Smoker     Types: Cigars     Quit date: 3/12/2019     Years since quittin.0   Smokeless Tobacco Never Used     Patient Active Problem List   Diagnosis     Psoriatic arthritis (H)     High risk medication use     Chronic pain of right knee     Primary osteoarthritis involving multiple joints     Degenerative joint disease of sacroiliac joint (H)     Psoriasis     Left inguinal hernia     Current Outpatient Medications   Medication Sig Dispense Refill     apremilast (OTEZLA) 30 mg Tab Take 1 tablet (30 mg total) by mouth 2 (two) times a day. 60 tablet 11     HYDROcodone-acetaminophen 5-325 mg per tablet Take 1-2 tablets by mouth every 6 (six) hours as needed. 18 tablet 0     leflunomide (ARAVA) 20 MG tablet Take 1 tablet (20 mg total) by mouth daily. 90 tablet 0     multivitamin with minerals (THERA-M) 9 mg iron-400 mcg Tab tablet Take 1 tablet by mouth daily.       omega-3/dha/epa/fish oil (FISH OIL-OMEGA-3 FATTY ACIDS) 300-1,000 mg capsule Take 2 g by mouth daily.       No current facility-administered medications for this visit.          EXAMINATION:       On the phone sounded comfortable, alert, oriented, speech was fluent,  memory recall appeared normal, did not sound like was in pain or short of breath.    LAB / IMAGING DATA:  ALT   Date Value Ref Range Status   2021 20 0 - 45 U/L Final   12/15/2020 20 0 - 45 U/L Final   2020 25 0 - 45 U/L Final     Albumin   Date Value Ref Range Status   2021 3.8 3.5 - 5.0 g/dL Final   12/15/2020 3.9 3.5 - 5.0 g/dL Final   2020 3.8 3.5 - 5.0 g/dL Final     Creatinine   Date Value Ref Range Status   2021 0.91 0.70 - 1.30 mg/dL Final   12/15/2020 0.92 0.70 - 1.30 mg/dL Final   2020 0.88 0.70  - 1.30 mg/dL Final       WBC   Date Value Ref Range Status   03/22/2021 7.0 4.0 - 11.0 thou/uL Final   12/15/2020 7.4 4.0 - 11.0 thou/uL Final     Hemoglobin   Date Value Ref Range Status   03/22/2021 14.3 14.0 - 18.0 g/dL Final   12/15/2020 14.9 14.0 - 18.0 g/dL Final   08/17/2020 14.4 14.0 - 18.0 g/dL Final     Platelets   Date Value Ref Range Status   03/22/2021 269 140 - 440 thou/uL Final   12/15/2020 271 140 - 440 thou/uL Final   08/17/2020 231 140 - 440 thou/uL Final       Lab Results   Component Value Date    SEDRATE 15 04/17/2018

## 2021-06-17 NOTE — TELEPHONE ENCOUNTER
Telephone Encounter by Jennifer Roca at 10/21/2020 12:45 PM     Author: Jennifer Roca Service: -- Author Type: Financial Resource Guide    Filed: 10/21/2020 12:46 PM Encounter Date: 10/21/2020 Status: Signed    : Jennifer Roca (Financial Resource Guide)       Medication: Otezla 30mg  QTY: 60 tabs for 30 days  Insurance: [employer]  Expected Copay: $0  Copay Card Needed? enrolled  Andrew Needed/Available? Not needed  Pharmacy: restricted to [facility]  Additional Information: NA

## 2021-06-17 NOTE — PROGRESS NOTES
ASSESSMENT AND PLAN:  Kai Jim 57 y.o. male is a for follow-up.  He has psoriatic arthritis widespread osteoarthritis.  He had been on Enbrel for 22 years and did very well.  His insurance changes he has now got to pay $4000 which he cannot.  Since Gema last year he has been off Enbrel.  He now has dactylitis of the right index finger.  Is going to start leflunomide.  Major side effects outlined.  [employer] handout provided.  Check labs today if normal he will initiate the treatment today.  We will check labs every 4 week.  Follow-up here in 2 months.    Diagnoses and all orders for this visit:    Psoriatic arthritis  -     leflunomide (ARAVA) 10 MG tablet; Take 1 tablet (10 mg total) by mouth daily.  Dispense: 30 tablet; Refill: 1  -     HM1(CBC and Differential)  -     Creatinine  -     ALT (SGPT)  -     Albumin  -     C-Reactive Protein  -     Erythrocyte Sedimentation Rate  -     HM1 (CBC with Diff)    High risk medication use  -     HM1(CBC and Differential)  -     Creatinine  -     ALT (SGPT)  -     Albumin  -     C-Reactive Protein  -     Erythrocyte Sedimentation Rate  -     HM1 (CBC with Diff)  -     ALT (SGPT); Standing  -     Albumin; Standing  -     Creatinine; Standing  -     HM2(CBC w/o Differential); Standing    Primary osteoarthritis involving multiple joints    Degenerative joint disease of sacroiliac joint      HISTORY OF PRESENTING ILLNESS:  Kai Jim, 57 y.o., male is here for follow-up after extended hiatus.  He is known to have psoriatic arthritis, psoriasis.  He could not take Enbrel anymore as the insurance change and necessitated him being $4000 per year for that.  He could not afford that.  He was hoping that he would not need it after 22 years of taking it.  Since June of last year he had done well only about 3 weeks ago now he started hurting.  This is in his left hand this is epicentered in the left index finger.  Which is swollen, does not move, his  pain full to moderately severe degree, this is in addition to the joint symptoms in the knees when he has had aspiration and is been planned to have partial knee replacement on the right side.  His psoriasis is minimally active on the elbows he has topicals for that.  He reports morning stiffness of an hour.  There is no fever weight loss blurry vision eye redness mild loss of nausea there is no rash.    Following is the excerpt from a recent note:      polyarthralgias.  Joints affected include the right hip(s) and the right knee(s). This has gone on for several months ago. Pain is described as sharp. It is when active and at night/ wakes from sleep at night.  His symptoms are moderate, severe. The symptoms are gradually worsening. Symptoms include tenderness to touch, limited range of motion.  Treatment to date has been without significant relief.  He has a long-standing history of psoriasis associated with psoriatic arthritis.  He was in his 30s when he had psoriasis.  He used to follow-up with Dr. Ervin salmeron.  He has been on Enbrel which is controlled rest of his joint symptoms quite nicely occasional back discomfort.  If he comes out of his truck in a certain way.  He has been documented to have lumbar spondylosis and sacroiliac degenerative changes on MRIs in the past.  He has undergone physical therapy.  He takes Mobic for that.  He noted pain in the right hip sometimes radiates down his leg.  He has had difficulty performing some of the day-to-day activities.  He had morning stiffness of 10 minutes.  He has no family history of rheumatoid arthritis in father.  His positive medicines.  He is not a smoker alcohol occasionally.  He has no fever weight loss blurry vision eye redness mild loss of nausea cough no new rash.  He injured his right index and middle finger chopping wood many years ago.  That was in 1986.  Further historical information and ADL limitations as noted in the [facility] health  "assessment questionnaire attached in the EMR. Rest of the 13 system ROS is negative.     ALLERGIES:Review of patient's allergies indicates no known allergies.    PAST MEDICAL/ACTIVE PROBLEMS/MEDICATION/ FAMILY HISTORY/SOCIAL DATA:  The patient has a family history of  Past Medical History:   Diagnosis Date     Psoriatic arthritis 1/8/2016     History   Smoking Status     Light Tobacco Smoker     Types: Cigars   Smokeless Tobacco     Never Used     Patient Active Problem List   Diagnosis     Psoriatic arthritis     High risk medication use     Chronic pain of right knee     Current Outpatient Prescriptions   Medication Sig Dispense Refill     calcipotriene (DOVONOX) 0.005 % ointment        clobetasol (TEMOVATE) 0.05 % ointment        cyclobenzaprine (FLEXERIL) 5 MG tablet        meloxicam (MOBIC) 15 MG tablet Take 1 tablet (15 mg total) by mouth daily. 30 tablet 0     No current facility-administered medications for this visit.      DETAILED EXAMINATION  04/17/18  :  Vitals:    04/17/18 1032   BP: 122/78   Pulse: 79   Resp: 10   Weight: 219 lb (99.3 kg)   Height: 5' 10.25\" (1.784 m)     Alert oriented. Head including the face is examined for malar rash, heliotropes, scarring, lupus pernio. Eyes examined for redness such as in episcleritis/scleritis, periorbital lesions.   Neck/ Face examined for parotid gland swelling, range of motion of neck.  Left upper and lower and right upper and lower extremities examined for tenderness, swelling, warmth of the appendicular joints, range of motion, edema, rash.  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  JLT of the knees bilaterally.  LAB / IMAGING DATA:  ALT   Date Value Ref Range Status   03/16/2017 18 0 - 45 U/L Final   01/08/2016 24 12 - 78 U/L Final     Albumin   Date Value Ref Range Status   03/16/2017 3.5 3.5 - 5.0 g/dL " Final   01/08/2016 3.7 3.5 - 5.0 g/dL Final     Creatinine   Date Value Ref Range Status   03/16/2017 0.91 0.70 - 1.30 mg/dL Final   01/08/2016 0.99 0.70 - 1.30 mg/dL Final     Comment:       New Creatinine method with new reference ranges as of 9/14/15       WBC   Date Value Ref Range Status   03/16/2017 7.0 4.0 - 11.0 thou/uL Final   01/15/2016 6.9 4.0 - 11.0 thou/uL Final     Hemoglobin   Date Value Ref Range Status   03/16/2017 14.3 14.0 - 18.0 g/dL Final   01/15/2016 15.4 14.0 - 18.0 g/dL Final   01/08/2016 14.9 14.0 - 18.0 g/dL Final     Platelets   Date Value Ref Range Status   03/16/2017 204 140 - 440 thou/uL Final   01/15/2016 264 140 - 440 thou/uL Final   01/08/2016 300 140 - 440 thou/uL Final       Lab Results   Component Value Date    SEDRATE 3 03/16/2017

## 2021-06-19 NOTE — PROGRESS NOTES
ASSESSMENT AND PLAN:  Kai Jim 57 y.o. male is a for follow-up.  He has psoriatic arthritis had been on Autumn for a long time.  With insurance change and huge jump and co-pay he could not continue that.  Started on leflunomide.  He has tolerated that nicely.  He has some residual discomfort.  Increase leflunomide to 20 mg daily.  Recent labs within acceptable range.  He has osteoarthritis.  He may undergo arthroplasty of the right knee in near future.  Return for follow-up in 3 months with labs in 4 weeks and just prior to next visit.        Diagnoses and all orders for this visit:    Psoriatic arthritis (H)  -     leflunomide (ARAVA) 20 MG tablet; Take 1 tablet (20 mg total) by mouth daily.  Dispense: 30 tablet; Refill: 1    Chronic pain of right knee    High risk medication use    Primary osteoarthritis involving multiple joints      HISTORY OF PRESENTING ILLNESS:  Kai Jim, 57 y.o., male is here for follow-up.  On his previous visit he had started on leflunomide 10 mg daily he has tolerated this well.  He noted mild discomfort in his joints.  More significantly in the right knee where he has osteoarthritis.  He had questions around hemiarthroplasty versus a full arthroplasty.  He is going to check with his surgeons on this further.  He has felt that he has 15 more years to work yet.  He noted overall pain level 0.5/10 difficulty performing some of the day-to-day activities in part because of the knee pain.  He has noted no stiffness in the morning.  He has not had fever weight loss eye redness mouth also nausea cough or new rash.  His psoriasis is minimally active.  He has  family history of rheumatoid arthritis in father.   He is not a smoker alcohol occasionally.   He injured his right index and middle finger chopping wood many years ago.  That was in 1986.  Further historical information and ADL limitations as noted in the multidimensional health assessment questionnaire attached in the  EMR.    Following is the excerpt from a recent note:      polyarthralgias.  Joints affected include the right hip(s) and the right knee(s). This has gone on for several months ago. Pain is described as sharp. It is when active and at night/ wakes from sleep at night.  His symptoms are moderate, severe. The symptoms are gradually worsening. Symptoms include tenderness to touch, limited range of motion.  Treatment to date has been without significant relief.  He has a long-standing history of psoriasis associated with psoriatic arthritis.  He was in his 30s when he had psoriasis.  He used to follow-up with Dr. Ervin salmeron.  He has been on Enbrel which is controlled rest of his joint symptoms quite nicely occasional back discomfort.  If he comes out of his truck in a certain way.  He has been documented to have lumbar spondylosis and sacroiliac degenerative changes on MRIs in the past.  He has undergone physical therapy.  He takes Mobic for that.  He noted pain in the right hip sometimes radiates down his leg.  He has had difficulty performing some of the day-to-day activities.  He had morning stiffness of 10 minutes. Rest of the 13 system ROS is negative.     ALLERGIES:Review of patient's allergies indicates no known allergies.    PAST MEDICAL/ACTIVE PROBLEMS/MEDICATION/ FAMILY HISTORY/SOCIAL DATA:  The patient has a family history of  Past Medical History:   Diagnosis Date     Psoriatic arthritis (H) 1/8/2016     History   Smoking Status     Light Tobacco Smoker     Types: Cigars   Smokeless Tobacco     Never Used     Patient Active Problem List   Diagnosis     Psoriatic arthritis (H)     High risk medication use     Chronic pain of right knee     Primary osteoarthritis involving multiple joints     Degenerative joint disease of sacroiliac joint     Current Outpatient Prescriptions   Medication Sig Dispense Refill     calcipotriene (DOVONOX) 0.005 % ointment        clobetasol (TEMOVATE) 0.05 % ointment         leflunomide (ARAVA) 10 MG tablet TAKE 1 TABLET(10 MG) BY MOUTH DAILY 30 tablet 1     meloxicam (MOBIC) 15 MG tablet Take 1 tablet (15 mg total) by mouth daily. 30 tablet 0     No current facility-administered medications for this visit.      DETAILED EXAMINATION  07/19/18  :  Vitals:    07/19/18 1653   BP: 142/82   Patient Site: Right Arm   Patient Position: Sitting   Cuff Size: Adult Regular   Pulse: 76   Weight: 218 lb (98.9 kg)     Alert oriented. Head including the face is examined for malar rash, heliotropes, scarring, lupus pernio. Eyes examined for redness such as in episcleritis/scleritis, periorbital lesions.   Neck/ Face examined for parotid gland swelling, range of motion of neck.  Left upper and lower and right upper and lower extremities examined for tenderness, swelling, warmth of the appendicular joints, range of motion, edema, rash.  Some of the important findings included: No residual dactylitis such as a left index finger.  He has an position of the right index and middle finger..  JLT of the knees especially the right side.      LAB / IMAGING DATA:  ALT   Date Value Ref Range Status   07/16/2018 28 0 - 45 U/L Final   05/29/2018 27 0 - 45 U/L Final   04/17/2018 15 0 - 45 U/L Final     Albumin   Date Value Ref Range Status   07/16/2018 3.8 3.5 - 5.0 g/dL Final   05/29/2018 3.7 3.5 - 5.0 g/dL Final   04/17/2018 3.7 3.5 - 5.0 g/dL Final     Creatinine   Date Value Ref Range Status   07/16/2018 0.89 0.70 - 1.30 mg/dL Final   05/29/2018 0.82 0.70 - 1.30 mg/dL Final   04/17/2018 0.85 0.70 - 1.30 mg/dL Final       WBC   Date Value Ref Range Status   07/16/2018 8.7 4.0 - 11.0 thou/uL Final   05/29/2018 6.2 4.0 - 11.0 thou/uL Final     Hemoglobin   Date Value Ref Range Status   07/16/2018 12.4 (L) 14.0 - 18.0 g/dL Final   05/29/2018 14.2 14.0 - 18.0 g/dL Final   04/17/2018 15.2 14.0 - 18.0 g/dL Final     Platelets   Date Value Ref Range Status   07/16/2018 402 140 - 440 thou/uL Final   05/29/2018 258 140 -  440 thou/uL Final   04/17/2018 290 140 - 440 thou/uL Final       Lab Results   Component Value Date    SEDRATE 15 04/17/2018

## 2021-06-19 NOTE — LETTER
Letter by Jareth Julian MD at      Author: Jareth Julian MD Service: -- Author Type: --    Filed:  Encounter Date: 3/18/2019 Status: (Other)         Alex Solares MD  [address]  [facility] [address]                                  March 18, 2019    Patient: Kai Jim   MR Number: [id number]   YOB: 1960   Date of Visit: 3/18/2019     Dear Dr. Sujatha MD:    Thank you for referring Kai Jim to me for evaluation. Below are the relevant portions of my assessment and plan of care.    If you have questions, please do not hesitate to call me. I look forward to following Kai along with you.    Sincerely,        Jareth Julian MD          CC  No Recipients  Jareth Julian MD  3/18/2019 12:43 PM  Sign at close encounter  HPI:  This is a 58 y.o. male here today with concerns of pain and bulging in his left groin area. He has noted this for the past 4 week(s). The symptoms have progressed and increased over this time. He comes in for evaluation secondary to the hernia causing enough issues to bother them with daily activities or chores. Urination issues and know prostate issues. Planned bx in a few weeks.     Allergies:Patient has no known allergies.    Past Medical History:   Diagnosis Date   ? Psoriatic arthritis (H) 1/8/2016       Past Surgical History:   Procedure Laterality Date   ? HERNIA REPAIR  1991    Right inguinal       CURRENT MEDS:      Family History   Problem Relation Age of Onset   ? Heart disease Mother    ? Heart disease Father         reports that he quit smoking 6 days ago. His smoking use included cigars. he has never used smokeless tobacco. He reports that he drinks alcohol.    Review of Systems - Negative except left groin discomfort, bulge. Also psa issues and planned bx next week. Frequent urination. Otherwise twelve system of review is negative.      Vitals:    03/18/19 0848   BP: 126/80   Patient Site: Right Arm   Patient Position: Sitting   Cuff  "Size: Adult Large   Pulse: 60   SpO2: 98%   Weight: 217 lb (98.4 kg)   Height: 5' 11.75\" (1.822 m)       Body mass index is 29.64 kg/m .    EXAM:  General: NAD   HEENT: normocephalic, PERRLA and EOMS intact  Mounth: Mucus membranes moist  Neck: Supple  Chest: Clear to auscultation bilaterally  CV: RRR  ABD: Soft nontender and nondistended, left inguinal hernia, reducible  EXT: Warm, pulses intact,   Neuro: Alert and oriented x3  Back: no CVA tenderness      Assessment/Plan: Pt with a left inguinal hernia. I discussed this at length with He.  I went over conservative management as well as surgical treatment of this.. I would plan on doing this via aneither laparoscopic, robotic or open all depending on the prostate biopsy and plans with approach and possible use of mesh. I went over the small risks of surgery including but not limited to bleeding and infection, anesthesia, recurrence rates and nerve injury. I discussed the expected recovery time as well. Will get this scheduled. He will contact us to have this scheduled.      Jareth Julian MD  [facility] Department of Surgery          "

## 2021-06-20 NOTE — PROGRESS NOTES
"ASSESSMENT AND PLAN:  Kai Jim 58 y.o. male is a for follow-up.  He has psoriatic arthritis.  He is undergoing a flareup affecting his left hand with knee at dactylitis of his left thumb.  I have asked him to take pictures of both the thumbs.  We discussed various options including adding Otezla local injections.  2-1/2 weeks or so ago he had what sounds like lymphangitis coursing along his volar aspect of the forearm.  He is finished a course of antibiotics.  He is going to take prednisone for the next 10-15 days.  Major side effects outlined including ocular metabolic bone related.  For now stay on leflunomide 20 mg daily check labs today.  He may turn out to be a candidate for further intensification of his immunosuppression such as with Otezla or tumor necrosis factor inhibitors.      Diagnoses and all orders for this visit:    Psoriatic arthritis (H)  -     leflunomide (ARAVA) 20 MG tablet; Take 1 tablet (20 mg total) by mouth daily.  Dispense: 30 tablet; Refill: 1  -     predniSONE (DELTASONE) 10 mg tablet; Take 1.5 tablets (15 mg total) by mouth daily for 15 days.  Dispense: 23 tablet; Refill: 0    Primary osteoarthritis involving multiple joints    High risk medication use      HISTORY OF PRESENTING ILLNESS:  Kai Jim, 58 y.o., male is here for follow-up.  Has psoriatic arthritis.  He is on 20 mg of leflunomide seems to have tolerated well though labs are pending today.  He reports 2-1/2 weeks of swelling discomfort in his left hand.  This was preceded by what he describes as \"a red line coursing on his volar aspect of the left.  He was seen in family medicine the thought was he may have cellulitis.  It sounds like he is describing lymphangitis.  He has finished a course of antibiotics.  With the persistent swelling he did have ultrasound of the left upper extremity, DVT was ruled out.  He has residual pain and swelling in his first MCP second MCP swelling of the right thumb.  Used to be on " Enbrel for several years before insurance dropped to the years ago.  He noted the pain level is first MCP area and surrounding.  There is no other joint areas similarly affected.  He did not notice stiffness of this to admission in the morning.  She has not had fever weight loss blurry vision eye redness mouth ulcers,  nausea.  By the end of the day the swelling is worse and first thing in the morning the swelling is reduced.  His psoriasis is minimally active.  He has  family history of rheumatoid arthritis in father.   He is not a smoker alcohol occasionally.   He injured his right index and middle finger chopping wood many years ago.  That was in 1986.  Further historical information and ADL limitations as noted in the multidimensional health assessment questionnaire attached in the EMR.    Following is the excerpt from a recent note:      polyarthralgias.  Joints affected include the right hip(s) and the right knee(s). This has gone on for several months ago. Pain is described as sharp. It is when active and at night/ wakes from sleep at night.  His symptoms are moderate, severe. The symptoms are gradually worsening. Symptoms include tenderness to touch, limited range of motion.  Treatment to date has been without significant relief.  He has a long-standing history of psoriasis associated with psoriatic arthritis.  He was in his 30s when he had psoriasis.  He used to follow-up with Dr. Ervin salmeron.  He has been on Enbrel which is controlled rest of his joint symptoms quite nicely occasional back discomfort.  If he comes out of his truck in a certain way.  He has been documented to have lumbar spondylosis and sacroiliac degenerative changes on MRIs in the past.  He has undergone physical therapy.  He takes Mobic for that.  He noted pain in the right hip sometimes radiates down his leg.  He has had difficulty performing some of the day-to-day activities.  He had morning stiffness of 10 minutes. Rest of the 13  "system ROS is negative.     ALLERGIES:Review of patient's allergies indicates no known allergies.    PAST MEDICAL/ACTIVE PROBLEMS/MEDICATION/ FAMILY HISTORY/SOCIAL DATA:  The patient has a family history of  Past Medical History:   Diagnosis Date     Psoriatic arthritis (H) 1/8/2016     History   Smoking Status     Light Tobacco Smoker     Types: Cigars   Smokeless Tobacco     Never Used     Patient Active Problem List   Diagnosis     Psoriatic arthritis (H)     High risk medication use     Chronic pain of right knee     Primary osteoarthritis involving multiple joints     Degenerative joint disease of sacroiliac joint     Current Outpatient Prescriptions   Medication Sig Dispense Refill     calcipotriene (DOVONOX) 0.005 % ointment        clobetasol (TEMOVATE) 0.05 % ointment        meloxicam (MOBIC) 15 MG tablet Take 1 tablet (15 mg total) by mouth daily. 30 tablet 0     leflunomide (ARAVA) 20 MG tablet Take 1 tablet (20 mg total) by mouth daily. 30 tablet 1     No current facility-administered medications for this visit.      DETAILED EXAMINATION  09/19/18  :  Vitals:    09/19/18 1136   BP: 134/80   Pulse: 82   Resp: 8   Weight: 218 lb (98.9 kg)   Height: 5' 10.25\" (1.784 m)     Alert oriented. Head including the face is examined for malar rash, heliotropes, scarring, lupus pernio. Eyes examined for redness such as in episcleritis/scleritis, periorbital lesions.   Neck/ Face examined for parotid gland swelling, range of motion of neck.  Left upper and lower and right upper and lower extremities examined for tenderness, swelling, warmth of the appendicular joints, range of motion, edema, rash.  Some of the important findings included: New dactylitis of the left thumb, flexor tendon sheath tenderness of first MCP tenderness and swelling, dorsum of the hand swelling, scattered tenderness of the MCPs and the left side.  Right knee is warmer has small effusion.  Right index and middle finger amputation.  LAB / IMAGING " DATA:  ALT   Date Value Ref Range Status   07/16/2018 28 0 - 45 U/L Final   05/29/2018 27 0 - 45 U/L Final   04/17/2018 15 0 - 45 U/L Final     Albumin   Date Value Ref Range Status   07/16/2018 3.8 3.5 - 5.0 g/dL Final   05/29/2018 3.7 3.5 - 5.0 g/dL Final   04/17/2018 3.7 3.5 - 5.0 g/dL Final     Creatinine   Date Value Ref Range Status   07/16/2018 0.89 0.70 - 1.30 mg/dL Final   05/29/2018 0.82 0.70 - 1.30 mg/dL Final   04/17/2018 0.85 0.70 - 1.30 mg/dL Final       WBC   Date Value Ref Range Status   07/16/2018 8.7 4.0 - 11.0 thou/uL Final   05/29/2018 6.2 4.0 - 11.0 thou/uL Final     Hemoglobin   Date Value Ref Range Status   07/16/2018 12.4 (L) 14.0 - 18.0 g/dL Final   05/29/2018 14.2 14.0 - 18.0 g/dL Final   04/17/2018 15.2 14.0 - 18.0 g/dL Final     Platelets   Date Value Ref Range Status   07/16/2018 402 140 - 440 thou/uL Final   05/29/2018 258 140 - 440 thou/uL Final   04/17/2018 290 140 - 440 thou/uL Final       Lab Results   Component Value Date    SEDRATE 15 04/17/2018

## 2021-06-21 NOTE — PROGRESS NOTES
ASSESSMENT AND PLAN:  Kai Jim 58 y.o. male is a for follow-up.  He has psoriatic arthritis.  He has osteoarthritis.  He is doing great with leflunomide.  He is to continue this.  Recent labs normal.  Management principles of OA reviewed.  Return for follow-up in 3 months or sooner if needed.  Labs just prior.    Diagnoses and all orders for this visit:    Psoriatic arthritis (H)  -     XR Hands Bilateral 3 or More VWS; Future; Expected date: 11/21/2018  -     XR Hands Bilateral 3 or More VWS    High risk medication use    Primary osteoarthritis involving multiple joints  -     XR Hands Bilateral 3 or More VWS; Future; Expected date: 11/21/2018  -     XR Hands Bilateral 3 or More VWS      HISTORY OF PRESENTING ILLNESS:  Kai Jim, 58 y.o., male is here for follow-up.  Has psoriatic arthritis.  He is on 20 mg of leflunomide seems to have tolerated well recent labs are normal.  He has done well after a short course of prednisone.  He noted pain level of 0.5/10.  He is able to do all his day-to-day activities with the thumb MCP area with activity.  He noted no stiffness in the morning.  The small patches on the olecranon area.  Used to be on Enbrel for several years before insurance dropped to the years ago.  He noted the pain level is first MCP area and surrounding.  There is no other joint areas similarly affected.  He did not notice stiffness of this to admission in the morning.  She has not had fever weight loss blurry vision eye redness mouth ulcers,  nausea.  By the end of the day the swelling is worse and first thing in the morning the swelling is reduced.  His psoriasis is minimally active.  He has  family history of rheumatoid arthritis in father.   He is not a smoker alcohol occasionally.   He injured his right index and middle finger chopping wood many years ago.  That was in 1986.  Further historical information and ADL limitations as noted in the multidimensional health assessment questionnaire  attached in the EMR.    Following is the excerpt from a recent note:      polyarthralgias.  Joints affected include the right hip(s) and the right knee(s). This has gone on for several months ago. Pain is described as sharp. It is when active and at night/ wakes from sleep at night.  His symptoms are moderate, severe. The symptoms are gradually worsening. Symptoms include tenderness to touch, limited range of motion.  Treatment to date has been without significant relief.  He has a long-standing history of psoriasis associated with psoriatic arthritis.  He was in his 30s when he had psoriasis.  He used to follow-up with Dr. Ervin salmeron.  He has been on Enbrel which is controlled rest of his joint symptoms quite nicely occasional back discomfort.  If he comes out of his truck in a certain way.  He has been documented to have lumbar spondylosis and sacroiliac degenerative changes on MRIs in the past.  He has undergone physical therapy.  He takes Mobic for that.  He noted pain in the right hip sometimes radiates down his leg.  He has had difficulty performing some of the day-to-day activities.  He had morning stiffness of 10 minutes. Rest of the 13 system ROS is negative.     ALLERGIES:Patient has no known allergies.    PAST MEDICAL/ACTIVE PROBLEMS/MEDICATION/ FAMILY HISTORY/SOCIAL DATA:  The patient has a family history of  Past Medical History:   Diagnosis Date     Psoriatic arthritis (H) 1/8/2016     Social History     Tobacco Use   Smoking Status Light Tobacco Smoker     Types: Cigars   Smokeless Tobacco Never Used     Patient Active Problem List   Diagnosis     Psoriatic arthritis (H)     High risk medication use     Chronic pain of right knee     Primary osteoarthritis involving multiple joints     Degenerative joint disease of sacroiliac joint     Current Outpatient Medications   Medication Sig Dispense Refill     calcipotriene (DOVONOX) 0.005 % ointment        clobetasol (TEMOVATE) 0.05 % ointment         meloxicam (MOBIC) 15 MG tablet Take 1 tablet (15 mg total) by mouth daily. 30 tablet 0     leflunomide (ARAVA) 20 MG tablet Take 1 tablet (20 mg total) by mouth daily. 30 tablet 1     No current facility-administered medications for this visit.      DETAILED EXAMINATION  11/21/18  :  Vitals:    11/21/18 1426   BP: 114/76   Patient Site: Right Arm   Patient Position: Sitting   Cuff Size: Adult Large   Pulse: 68   Weight: 218 lb (98.9 kg)     Alert oriented. Head including the face is examined for malar rash, heliotropes, scarring, lupus pernio. Eyes examined for redness such as in episcleritis/scleritis, periorbital lesions.   Neck/ Face examined for parotid gland swelling, range of motion of neck.  Left upper and lower and right upper and lower extremities examined for tenderness, swelling, warmth of the appendicular joints, range of motion, edema, rash.  Some of the important findings included: He has synovial thickening at the left first and second MCPs, the previously noted inflammation has subsided significantly.  Minimal warmth of the right knee.  Right index and middle finger amputation.  Minimal psoriatic lesions on olecranon areas bilaterally.  LAB / IMAGING DATA:  ALT   Date Value Ref Range Status   11/20/2018 22 0 - 45 U/L Final   09/19/2018 28 0 - 45 U/L Final   07/16/2018 28 0 - 45 U/L Final     Albumin   Date Value Ref Range Status   11/20/2018 3.7 3.5 - 5.0 g/dL Final   09/19/2018 3.6 3.5 - 5.0 g/dL Final   07/16/2018 3.8 3.5 - 5.0 g/dL Final     Creatinine   Date Value Ref Range Status   11/20/2018 0.81 0.70 - 1.30 mg/dL Final   09/19/2018 0.92 0.70 - 1.30 mg/dL Final   07/16/2018 0.89 0.70 - 1.30 mg/dL Final       WBC   Date Value Ref Range Status   11/20/2018 7.9 4.0 - 11.0 thou/uL Final   09/19/2018 8.0 4.0 - 11.0 thou/uL Final     Hemoglobin   Date Value Ref Range Status   11/20/2018 14.6 14.0 - 18.0 g/dL Final   09/19/2018 14.6 14.0 - 18.0 g/dL Final   07/16/2018 12.4 (L) 14.0 - 18.0 g/dL Final      Platelets   Date Value Ref Range Status   11/20/2018 245 140 - 440 thou/uL Final   09/19/2018 253 140 - 440 thou/uL Final   07/16/2018 402 140 - 440 thou/uL Final       Lab Results   Component Value Date    SEDRATE 15 04/17/2018

## 2021-06-21 NOTE — LETTER
Letter by Jareth Julian MD at      Author: Jareth Julian MD Service: -- Author Type: --    Filed:  Encounter Date: 11/13/2020 Status: (Other)         Alex Solares MD  [address]  [facility] [address]                                  November 14, 2020    Patient: Kai Jim   MR Number: [id number]   YOB: 1960   Date of Visit: 11/13/2020     Dear Dr. Sujatha MD:    Thank you for referring Kai Jim to me for evaluation. Below are the relevant portions of my assessment and plan of care.    If you have questions, please do not hesitate to call me. I look forward to following Kai along with you.    Sincerely,        Jareth Julian MD          CC  No Recipients  Jareth Julian MD  11/14/2020  3:58 PM  Sign when Signing Visit        HPI:     This is a 60 y.o. male here today with concerns of pain and bulging in his left groin area. He has noted this for the past few year(s). The symptoms have progressed and increased over this time. He comes in for evaluation secondary to the hernia causing enough issues to bother them with daily activities or chores.    Allergies:Patient has no known allergies.    Past Medical History:   Diagnosis Date   ? Psoriatic arthritis (H) 1/8/2016       Past Surgical History:   Procedure Laterality Date   ? HERNIA REPAIR  1991    Right inguinal       CURRENT MEDS:      Family History   Problem Relation Age of Onset   ? Heart disease Mother    ? Heart disease Father         reports that he quit smoking about 20 months ago. His smoking use included cigars. He has never used smokeless tobacco. He reports current alcohol use.    Review of Systems - Negative except left groin bulge and pain. Otherwise twelve system of review is negative.      Vitals:    11/13/20 1224   BP: 124/70   Patient Site: Right Arm   Patient Position: Sitting   Cuff Size: Adult Regular       There is no height or weight on file to calculate BMI.    EXAM:  General: NAD   HEENT:  normocephalic, PERRLA and EOMS intact  Mounth: Mucus membranes moist  Neck: Supple  Chest: Clear to auscultation bilaterally  CV: RRR  ABD: Soft nontender and nondistended, left inguinal hernia, reducible  EXT: Warm, pulses intact,   Neuro: Alert and oriented x3  Back: no CVA tenderness    Assessment/Plan: Pt with a left inguinal hernia. I discussed this at length with He.  I went over conservative management as well as surgical treatment of this.. I would plan on doing this via anlaparoscopic  approach with possible use of mesh. I went over the small risks of surgery including but not limited to bleeding and infection, anesthesia, recurrence rates and nerve injury. I discussed the expected recovery time as well. Will get this scheduled. He will contact us to have this scheduled.      MD Jareth Beltre MD, MD  General Surgery [phone]  Vascular Surgery [phone]

## 2021-06-21 NOTE — LETTER
Letter by Elenita Ybarra CMA at      Author: Elenita Ybarra CMA Service: -- Author Type: --    Filed:  Encounter Date: 11/13/2020 Status: (Other)       Kai,  We've received instruction to get you scheduled for laparoscopic left inguinal hernia repair with Dr Julian. We have that arranged as follows:     Surgery Date: Thursday Dec. 17th    Location: [facility]                 3rd Floor, [facility]                  [address] [place] [id number]     Arrival Time: 7:15 AM (unless instructed otherwise by the preop nurse)    Prep:     1. Schedule a preop physical with your primary care doctor. This may be virtual or face-to-face depending on your doctors preference. Call them right away to schedule this.    2. COVID19 testing is required within 4 days of surgery. We have this scheduled for you at [facility], [address] [place] [address], 1st Floor at 9:45 AM on Monday December 14th. If your test is positive, your surgery will be canceled.     3. Nothing to eat or drink for 8 hours before surgery unless instructed differently by the preop nurse.    4. No blood thinners including aspirin for one week prior to surgery. Verify this is safe for you with your primary care doctor before stopping.     5. You need an adult to drive you home and stay with you 24 hours after surgery. Because of COVID19 related visitor restrictions, adult surgical patients are allowed one family member  (pediatric surgery patients are allowed two) in the waiting area.     6. When you arrive to the surgery center, you will be screened for COVID19 symptoms. If you screen positive, your surgery will need to be postponed for your safety.    7. If the community sees a new surge in COVID19 hospital admissions, your procedure may need to be postponed. We will contact you if this happens.    8. We always encourage you to notify your insurance any time you have something scheduled including  surgery. The number is usually right on the back of your insurance card.     Call our office if you have any questions! Thank you!     Elenita Ybarra  Surgery Scheduler  [facility]  Surgery Clinic [facility]  Direct line: [phone]   Main Line: [phone]

## 2021-06-25 NOTE — PROGRESS NOTES
"HPI:  This is a 58 y.o. male here today with concerns of pain and bulging in his left groin area. He has noted this for the past 4 week(s). The symptoms have progressed and increased over this time. He comes in for evaluation secondary to the hernia causing enough issues to bother them with daily activities or chores. Urination issues and know prostate issues. Planned bx in a few weeks.     Allergies:Patient has no known allergies.    Past Medical History:   Diagnosis Date     Psoriatic arthritis (H) 1/8/2016       Past Surgical History:   Procedure Laterality Date     HERNIA REPAIR  1991    Right inguinal       CURRENT MEDS:      Family History   Problem Relation Age of Onset     Heart disease Mother      Heart disease Father         reports that he quit smoking 6 days ago. His smoking use included cigars. he has never used smokeless tobacco. He reports that he drinks alcohol.    Review of Systems - Negative except left groin discomfort, bulge. Also psa issues and planned bx next week. Frequent urination. Otherwise twelve system of review is negative.      Vitals:    03/18/19 0848   BP: 126/80   Patient Site: Right Arm   Patient Position: Sitting   Cuff Size: Adult Large   Pulse: 60   SpO2: 98%   Weight: 217 lb (98.4 kg)   Height: 5' 11.75\" (1.822 m)       Body mass index is 29.64 kg/m .    EXAM:  General: NAD   HEENT: normocephalic, PERRLA and EOMS intact  Mounth: Mucus membranes moist  Neck: Supple  Chest: Clear to auscultation bilaterally  CV: RRR  ABD: Soft nontender and nondistended, left inguinal hernia, reducible  EXT: Warm, pulses intact,   Neuro: Alert and oriented x3  Back: no CVA tenderness      Assessment/Plan: Pt with a left inguinal hernia. I discussed this at length with He.  I went over conservative management as well as surgical treatment of this.. I would plan on doing this via aneither laparoscopic, robotic or open all depending on the prostate biopsy and plans with approach and possible use of " mesh. I went over the small risks of surgery including but not limited to bleeding and infection, anesthesia, recurrence rates and nerve injury. I discussed the expected recovery time as well. Will get this scheduled. He will contact us to have this scheduled.      Jareth Julian MD  [facility] Department of Surgery

## 2021-06-27 ENCOUNTER — HEALTH MAINTENANCE LETTER (OUTPATIENT)
Age: 61
End: 2021-06-27

## 2021-06-29 ENCOUNTER — AMBULATORY - HEALTHEAST (OUTPATIENT)
Dept: LAB | Facility: CLINIC | Age: 61
End: 2021-06-29

## 2021-06-29 DIAGNOSIS — L40.50 PSORIATIC ARTHRITIS (H): ICD-10-CM

## 2021-06-29 LAB
ALBUMIN SERPL-MCNC: 3.6 G/DL (ref 3.5–5)
ALT SERPL W P-5'-P-CCNC: 18 U/L (ref 0–45)
CREAT SERPL-MCNC: 0.89 MG/DL (ref 0.7–1.3)
ERYTHROCYTE [DISTWIDTH] IN BLOOD BY AUTOMATED COUNT: 13.7 % (ref 11–14.5)
GFR SERPL CREATININE-BSD FRML MDRD: >60 ML/MIN/1.73M2
HCT VFR BLD AUTO: 40.2 % (ref 40–54)
HGB BLD-MCNC: 13.4 G/DL (ref 14–18)
MCH RBC QN AUTO: 29.3 PG (ref 27–34)
MCHC RBC AUTO-ENTMCNC: 33.3 G/DL (ref 32–36)
MCV RBC AUTO: 88 FL (ref 80–100)
PLATELET # BLD AUTO: 272 THOU/UL (ref 140–440)
PMV BLD AUTO: 9.1 FL (ref 7–10)
RBC # BLD AUTO: 4.58 MILL/UL (ref 4.4–6.2)
WBC: 7 THOU/UL (ref 4–11)

## 2021-06-29 NOTE — PROGRESS NOTES
Progress Notes by Jareth Julian MD at 11/13/2020 12:20 PM     Author: Jareth Julian MD Service: -- Author Type: Physician    Filed: 11/14/2020  3:58 PM Encounter Date: 11/13/2020 Status: Signed    : Jareth Julian MD (Physician)             HPI:     This is a 60 y.o. male here today with concerns of pain and bulging in his left groin area. He has noted this for the past few year(s). The symptoms have progressed and increased over this time. He comes in for evaluation secondary to the hernia causing enough issues to bother them with daily activities or chores.    Allergies:Patient has no known allergies.    Past Medical History:   Diagnosis Date   ? Psoriatic arthritis (H) 1/8/2016       Past Surgical History:   Procedure Laterality Date   ? HERNIA REPAIR  1991    Right inguinal       CURRENT MEDS:      Family History   Problem Relation Age of Onset   ? Heart disease Mother    ? Heart disease Father         reports that he quit smoking about 20 months ago. His smoking use included cigars. He has never used smokeless tobacco. He reports current alcohol use.    Review of Systems - Negative except left groin bulge and pain. Otherwise twelve system of review is negative.      Vitals:    11/13/20 1224   BP: 124/70   Patient Site: Right Arm   Patient Position: Sitting   Cuff Size: Adult Regular       There is no height or weight on file to calculate BMI.    EXAM:  General: NAD   HEENT: normocephalic, PERRLA and EOMS intact  Mounth: Mucus membranes moist  Neck: Supple  Chest: Clear to auscultation bilaterally  CV: RRR  ABD: Soft nontender and nondistended, left inguinal hernia, reducible  EXT: Warm, pulses intact,   Neuro: Alert and oriented x3  Back: no CVA tenderness    Assessment/Plan: Pt with a left inguinal hernia. I discussed this at length with He.  I went over conservative management as well as surgical treatment of this.. I would plan on doing this via anlaparoscopic  approach with possible use  of mesh. I went over the small risks of surgery including but not limited to bleeding and infection, anesthesia, recurrence rates and nerve injury. I discussed the expected recovery time as well. Will get this scheduled. He will contact us to have this scheduled.      MD Jareth Beltre MD, MD  General Surgery [phone]  Vascular Surgery [phone]

## 2021-06-30 ENCOUNTER — COMMUNICATION - HEALTHEAST (OUTPATIENT)
Dept: RHEUMATOLOGY | Facility: CLINIC | Age: 61
End: 2021-06-30

## 2021-06-30 DIAGNOSIS — L40.50 PSORIATIC ARTHRITIS (H): ICD-10-CM

## 2021-07-03 NOTE — ANESTHESIA PREPROCEDURE EVALUATION
Anesthesia Preprocedure Evaluation by Donta Gibson MD at 12/17/2020  7:32 AM     Author: Donta Gibson MD Service: -- Author Type: Physician    Filed: 12/17/2020  7:33 AM Date of Service: 12/17/2020  7:32 AM Status: Signed    : Donta Gibson MD (Physician)       Anesthesia Evaluation      Patient summary reviewed   No history of anesthetic complications     Airway   Mallampati: III  Neck ROM: full   Pulmonary - negative ROS and normal exam                          Cardiovascular - negative ROS and normal exam   Neuro/Psych - negative ROS     Endo/Other - negative ROS   (+) arthritis (psoriatic - good response to meds),      GI/Hepatic/Renal - negative ROS           Dental - normal exam   (+) caps                           Anesthesia Plan  Planned anesthetic: general endotracheal  Versed/fentanyl/propofol/sheeba  Ketamine PRN  Decadron/zofran    ASA 2   Induction: intravenous   Anesthetic plan and risks discussed with: patient  Anesthesia plan special considerations: antiemetics,   Post-op plan: routine recovery    12/14/2020 covid pcr negative    Results for orders placed or performed in visit on 12/15/20   Albumin   Result Value Ref Range    Albumin 3.9 3.5 - 5.0 g/dL   ALT (SGPT)   Result Value Ref Range    ALT 20 0 - 45 U/L   HM2(CBC w/o Differential)   Result Value Ref Range    WBC 7.4 4.0 - 11.0 thou/uL    RBC 4.88 4.40 - 6.20 mill/uL    Hemoglobin 14.9 14.0 - 18.0 g/dL    Hematocrit 44.2 40.0 - 54.0 %    MCV 90 80 - 100 fL    MCH 30.5 27.0 - 34.0 pg    MCHC 33.8 32.0 - 36.0 g/dL    RDW 11.8 11.0 - 14.5 %    Platelets 271 140 - 440 thou/uL    MPV 7.6 7.0 - 10.0 fL   Creatinine   Result Value Ref Range    Creatinine 0.92 0.70 - 1.30 mg/dL    GFR MDRD Af Amer >60 >60 mL/min/1.73m2    GFR MDRD Non Af Amer >60 >60 mL/min/1.73m2

## 2021-07-04 NOTE — TELEPHONE ENCOUNTER
Telephone Encounter by Angelique Dickson RN at 6/30/2021 10:18 AM     Author: Angelique Dickson RN Service: -- Author Type: Registered Nurse    Filed: 6/30/2021 10:19 AM Encounter Date: 6/30/2021 Status: Signed    : Angelique Dickson RN (Registered Nurse)       Refilled per Rheum RN refill protocol

## 2021-07-23 ENCOUNTER — OFFICE VISIT (OUTPATIENT)
Dept: SURGERY | Facility: CLINIC | Age: 61
End: 2021-07-23
Payer: COMMERCIAL

## 2021-07-23 DIAGNOSIS — K40.90 RIGHT INGUINAL HERNIA: Primary | ICD-10-CM

## 2021-07-23 PROCEDURE — 99213 OFFICE O/P EST LOW 20 MIN: CPT | Performed by: SPECIALIST

## 2021-07-23 NOTE — LETTER
7/23/2021         RE: Kai Jim  [address] [place] [address]        Dear Colleague,    Thank you for referring your patient, Kai Jim, to the [facility] [place]. Please see a copy of my visit note below.          HPI:  This is a 61 year old male here today with concerns of pain and bulging in his right groin area. He has noted this for the past a a few  month. The symptoms have progressed and increased over this time. He comes in for evaluation secondary to the hernia causing enough issues to bother them with daily activities or chores.    Allergies:Patient has no known allergies.    Past Medical History:   Diagnosis Date     Psoriatic arthritis (H) 1/8/2016       Past Surgical History:   Procedure Laterality Date     HERNIA REPAIR  1991    Right inguinal     [place] LAP,INGUINAL HERNIA REPR,INITIAL Left 12/17/2020    Procedure: HERNIORRHAPHY, INGUINAL, LAPAROSCOPIC;  Surgeon: Jareth Julian MD;  Location: [facility];  Service: General       CURRENT MEDS:      Family History   Problem Relation Age of Onset     Heart Disease Mother      Heart Disease Father         reports that he quit smoking about 2 years ago. His smoking use included cigars, cigars, and cigars. He has never used smokeless tobacco. He reports current alcohol use. He reports previous drug use.    Review of Systems - Negative except right groin bulge and pain. Prior lap repair of the left side. Otherwise twelve system of review is negative.      There were no vitals filed for this visit.    There is no height or weight on file to calculate BMI.    EXAM:  General: NAD   HEENT: normocephalic, PERRLA and EOMS intact  Mounth: Mucus membranes moist  Neck: Supple  Chest: Clear to auscultation bilaterally  CV: RRR  ABD: Soft nontender and nondistended, right inguinal hernia, reducible  EXT: Warm, pulses intact,   Neuro: Alert and oriented x3  Back: no CVA  tenderness    Assessment/Plan: Pt with a right inguinal hernia. I discussed this at length with He.  I went over conservative management as well as surgical treatment of this.. I would plan on doing this via anrobotic  approach with possible use of mesh. I went over the small risks of surgery including but not limited to bleeding and infection, anesthesia, recurrence rates and nerve injury. I discussed the expected recovery time as well. Will get this scheduled. He will contact us to have this scheduled.      Jareth Julian MD ,MD Jareth Julian MD  General Surgery [phone]  Vascular Surgery [phone]          Again, thank you for allowing me to participate in the care of your patient.        Sincerely,        Jareth Julian MD

## 2021-07-23 NOTE — PROGRESS NOTES
HPI:  This is a 61 year old male here today with concerns of pain and bulging in his right groin area. He has noted this for the past a a few  month. The symptoms have progressed and increased over this time. He comes in for evaluation secondary to the hernia causing enough issues to bother them with daily activities or chores.    Allergies:Patient has no known allergies.    Past Medical History:   Diagnosis Date     Psoriatic arthritis (H) 1/8/2016       Past Surgical History:   Procedure Laterality Date     HERNIA REPAIR  1991    Right inguinal     [place] LAP,INGUINAL HERNIA REPR,INITIAL Left 12/17/2020    Procedure: HERNIORRHAPHY, INGUINAL, LAPAROSCOPIC;  Surgeon: Jareth Julian MD;  Location: [facility];  Service: General       CURRENT MEDS:      Family History   Problem Relation Age of Onset     Heart Disease Mother      Heart Disease Father         reports that he quit smoking about 2 years ago. His smoking use included cigars, cigars, and cigars. He has never used smokeless tobacco. He reports current alcohol use. He reports previous drug use.    Review of Systems - Negative except right groin bulge and pain. Prior lap repair of the left side. Otherwise twelve system of review is negative.      There were no vitals filed for this visit.    There is no height or weight on file to calculate BMI.    EXAM:  General: NAD   HEENT: normocephalic, PERRLA and EOMS intact  Mounth: Mucus membranes moist  Neck: Supple  Chest: Clear to auscultation bilaterally  CV: RRR  ABD: Soft nontender and nondistended, right inguinal hernia, reducible  EXT: Warm, pulses intact,   Neuro: Alert and oriented x3  Back: no CVA tenderness    Assessment/Plan: Pt with a right inguinal hernia. I discussed this at length with He.  I went over conservative management as well as surgical treatment of this.. I would plan on doing this via anrobotic  approach with possible use of mesh. I went over the small risks of surgery including  but not limited to bleeding and infection, anesthesia, recurrence rates and nerve injury. I discussed the expected recovery time as well. Will get this scheduled. He will contact us to have this scheduled.      Jareth Julian MD ,MD Jareth Julian MD  General Surgery [phone]  Vascular Surgery [phone]

## 2021-08-02 ENCOUNTER — TELEPHONE (OUTPATIENT)
Dept: SURGERY | Facility: CLINIC | Age: 61
End: 2021-08-02

## 2021-08-02 NOTE — TELEPHONE ENCOUNTER
Left voicemail number 2 today regarding the hold we have for him on 8/19/21 for surgery. Provided direct line to call back.    If surgery is not confirmed by tomorrow, we will have to remove the hold.    Elenita ALANIS  Surgery Scheduler  [facility]  Surgery Clinic [facility]  Direct line: [phone]   Main Line: [phone]  Direct Fax: [phone]

## 2021-09-14 ENCOUNTER — LAB (OUTPATIENT)
Dept: LAB | Facility: CLINIC | Age: 61
End: 2021-09-14
Payer: COMMERCIAL

## 2021-09-14 ENCOUNTER — TELEPHONE (OUTPATIENT)
Dept: LAB | Facility: CLINIC | Age: 61
End: 2021-09-14

## 2021-09-14 DIAGNOSIS — L40.50 PSORIATIC ARTHRITIS (H): Primary | ICD-10-CM

## 2021-09-14 DIAGNOSIS — L40.50 PSORIATIC ARTHRITIS (H): ICD-10-CM

## 2021-09-14 LAB
ALBUMIN SERPL-MCNC: 3.6 G/DL (ref 3.5–5)
ALT SERPL W P-5'-P-CCNC: 14 U/L (ref 0–45)
CREAT SERPL-MCNC: 0.87 MG/DL (ref 0.7–1.3)
ERYTHROCYTE [DISTWIDTH] IN BLOOD BY AUTOMATED COUNT: 13 % (ref 10–15)
GFR SERPL CREATININE-BSD FRML MDRD: >90 ML/MIN/1.73M2
HCT VFR BLD AUTO: 44.8 % (ref 40–53)
HGB BLD-MCNC: 14.6 G/DL (ref 13.3–17.7)
MCH RBC QN AUTO: 29 PG (ref 26.5–33)
MCHC RBC AUTO-ENTMCNC: 32.6 G/DL (ref 31.5–36.5)
MCV RBC AUTO: 89 FL (ref 78–100)
PLATELET # BLD AUTO: 261 10E3/UL (ref 150–450)
RBC # BLD AUTO: 5.04 10E6/UL (ref 4.4–5.9)
WBC # BLD AUTO: 7.1 10E3/UL (ref 4–11)

## 2021-09-14 PROCEDURE — 82565 ASSAY OF CREATININE: CPT

## 2021-09-14 PROCEDURE — 36415 COLL VENOUS BLD VENIPUNCTURE: CPT

## 2021-09-14 PROCEDURE — 85027 COMPLETE CBC AUTOMATED: CPT

## 2021-09-14 PROCEDURE — 84460 ALANINE AMINO (ALT) (SGPT): CPT

## 2021-09-14 PROCEDURE — 82040 ASSAY OF SERUM ALBUMIN: CPT

## 2021-09-17 ENCOUNTER — APPOINTMENT (OUTPATIENT)
Dept: RADIOLOGY | Facility: CLINIC | Age: 61
End: 2021-09-17
Attending: EMERGENCY MEDICINE
Payer: COMMERCIAL

## 2021-09-17 ENCOUNTER — HOSPITAL ENCOUNTER (EMERGENCY)
Facility: CLINIC | Age: 61
Discharge: HOME | End: 2021-09-17
Admitting: EMERGENCY MEDICINE
Payer: COMMERCIAL

## 2021-09-17 VITALS
HEIGHT: 72 IN | TEMPERATURE: 98.5 F | HEART RATE: 50 BPM | DIASTOLIC BLOOD PRESSURE: 87 MMHG | SYSTOLIC BLOOD PRESSURE: 166 MMHG | OXYGEN SATURATION: 98 % | RESPIRATION RATE: 18 BRPM | BODY MASS INDEX: 29.8 KG/M2 | WEIGHT: 220 LBS

## 2021-09-17 DIAGNOSIS — S62.621B OPEN DISPLACED FRACTURE OF MIDDLE PHALANX OF LEFT INDEX FINGER, INITIAL ENCOUNTER: ICD-10-CM

## 2021-09-17 DIAGNOSIS — S61.213A LACERATION OF LEFT MIDDLE FINGER WITHOUT FOREIGN BODY WITHOUT DAMAGE TO NAIL, INITIAL ENCOUNTER: ICD-10-CM

## 2021-09-17 PROCEDURE — 99284 EMERGENCY DEPT VISIT MOD MDM: CPT | Mod: 25

## 2021-09-17 PROCEDURE — 12004 RPR S/N/AX/GEN/TRK7.6-12.5CM: CPT

## 2021-09-17 PROCEDURE — 29130 APPL FINGER SPLINT STATIC: CPT | Mod: LT

## 2021-09-17 PROCEDURE — 250N000013 HC RX MED GY IP 250 OP 250 PS 637: Performed by: PHYSICIAN ASSISTANT

## 2021-09-17 PROCEDURE — 26720 TREAT FINGER FRACTURE EACH: CPT | Mod: LT

## 2021-09-17 PROCEDURE — 73130 X-RAY EXAM OF HAND: CPT | Mod: LT

## 2021-09-17 RX ORDER — CEPHALEXIN 500 MG/1
500 CAPSULE ORAL 4 TIMES DAILY
Qty: 28 CAPSULE | Refills: 0 | Status: SHIPPED | OUTPATIENT
Start: 2021-09-17 | End: 2021-09-24

## 2021-09-17 RX ORDER — CEPHALEXIN 500 MG/1
500 CAPSULE ORAL ONCE
Status: COMPLETED | OUTPATIENT
Start: 2021-09-17 | End: 2021-09-17

## 2021-09-17 RX ORDER — OXYCODONE AND ACETAMINOPHEN 5; 325 MG/1; MG/1
2 TABLET ORAL ONCE
Status: COMPLETED | OUTPATIENT
Start: 2021-09-17 | End: 2021-09-17

## 2021-09-17 RX ORDER — OXYCODONE AND ACETAMINOPHEN 5; 325 MG/1; MG/1
1 TABLET ORAL EVERY 6 HOURS PRN
Qty: 8 TABLET | Refills: 0 | Status: SHIPPED | OUTPATIENT
Start: 2021-09-17 | End: 2021-09-21

## 2021-09-17 RX ADMIN — OXYCODONE HYDROCHLORIDE AND ACETAMINOPHEN 2 TABLET: 5; 325 TABLET ORAL at 18:50

## 2021-09-17 RX ADMIN — CEPHALEXIN 500 MG: 500 CAPSULE ORAL at 21:57

## 2021-09-17 ASSESSMENT — ENCOUNTER SYMPTOMS
NUMBNESS: 0
MYALGIAS: 1
WOUND: 1

## 2021-09-17 ASSESSMENT — MIFFLIN-ST. JEOR: SCORE: 1836.94

## 2021-09-17 NOTE — ED PROVIDER NOTES
Emergency Department Encounter   NAME: Kai Jim ; AGE: 61 year old male ; YOB: 1960 ; MRN: [id number] ; PCP: Carlos Bhatt   ED PROVIDER: Giana Austin PA-C    Evaluation Date & Time:   9/17/2021  5:31 PM    CHIEF COMPLAINT:  Hand Injury      Impression and Plan   MDM: Kai Jim is a 61 year old male with a pertinent history of anxiety, hyperlipidemia, primary osteoarthritis, who presents to the ED by walk-in for evaluation of left hand laceration.  The patient presented to the emergency department for evaluation of lacerations to his left hand fingers following an incident with a chainsaw.  The chainsaw slipped, lacerating the dorsal aspect of his nondominant left hand second and third digits.  Third digit has a 3 cm laceration overlying the PIP joint which was thoroughly explored under a bloodless field without evidence of retained foreign body.  Patient's hands appear dirty despite no foreign body found in the wound.  Digital block performed which she tolerated well and wound was thoroughly irrigated by nursing staff, and closed using 3 interrupted sutures.  Distal CMS intact prior to and following closure.  He has full active and passive flexion extension at all finger joints -no concern for tendon rupture.  Second digit has a complex subcutaneous laceration with some macerated tissue overlying his DIP joint measuring 5 cm.  It does extend into the joint.  X-ray was obtained and confirms suspicion of an osseous defect in the radial head of the middle phalanx with a few small osseous fragments in the subcutaneous tissues.  Wound is deep and was thoroughly explored under a bloodless field, and no evidence of tendon rupture through range of motion, however patient is unable to fully extend at the DIP joint and finger is slightly held in a flexed position at the DIP which is concerning for possible extensor tendon rupture versus defect due to fracture and osseous fragments  preventing this.  Discussed the case with [facility], Dr. Gutierres, who recommends irrigation, loose wound closure, finger splint, oral antibiotics for home, and to be seen in clinic on Monday for further evaluation. Wound thoroughly irrigated by  Nursing staff. Wound loosely closed using 2 interrupted sutures.  Lateral digit has a large defect not amenable to suture repair. No active bleeding - will place non adherent dressing. Distal CMS unchanged following closure.  Long finger splint provided and a slight extension at the DIP. We discussed wound care, importance of follow-up with hand surgeon on Monday, monitoring for signs of infection, scarring, possible tendon injury and underlying fracture.  Patient verbalized understanding is comfortable with the plan.  Discharged home in good condition.  Tetanus up-to-date (9/24/2020).     ED COURSE:  6:38 PM I met and introduced myself to the patient. I gathered initial history and performed my physical exam. We discussed plan for initial workup.   6:53 PM I staffed the patient with Dr. Lemus.   8:25 PM I performed a digital block.   8:42 PM I paged orthopedics.   8:53 PM I discussed the patient with Dr. Gutierres, hand surgeon.  9:09 PM I performed a laceration repair.   9:42 PM I discussed the plan for discharge with the patient, and patient is agreeable. We discussed supportive cares at home and reasons for return to the ER including new or worsening symptoms - all questions and concerns addressed. Patient to be discharged by RN.     At the conclusion of the encounter I discussed the results of all the tests and the disposition. The questions were answered. The patient or family acknowledged understanding and was agreeable with the care plan.  I did see the patient while wearing full COVID-compliant PPE.     FINAL IMPRESSION:  No diagnosis found.      MEDICATIONS GIVEN IN THE EMERGENCY DEPARTMENT:  Medications   oxyCODONE-acetaminophen (PERCOCET) 5-325 MG per  tablet 2 tablet (2 tablets Oral Given 21 1850)         NEW PRESCRIPTIONS STARTED AT TODAY'S ED VISIT:  New Prescriptions    No medications on file         HPI   Patient information was obtained from: self    Use of Intrepreter: N/A     Kai Jim is a 61 year old male with a pertinent history of anxiety, hyperlipidemia, primary osteoarthritis, who presents to the ED by walk-in for evaluation of left hand laceration.    Patient reports that today at ~1630 (~2 hours ago) he was using a chainsaw when it got caught on the branch which slipped and then the chainsaw cut his left index and middle fingers. Endorses current pain and has not taken any pain medications for his symptoms. Reports that he can bend his fingers only slightly and that he has difficulty straightening his left index finger. Patient is right hand dominant. Reports that his last tetanus vaccine was adminstered last year. Patient is not on blood thinners. Denies any loss of sensation, and any other symptoms or complaints at this time.       REVIEW OF SYSTEMS:  Review of Systems   Musculoskeletal: Positive for myalgias (left hand fingers).   Skin: Positive for wound (left index and middle fingers).   Neurological: Negative for numbness.   All other systems reviewed and are negative.      Medical History     Past Medical History:   Diagnosis Date     Psoriatic arthritis (H) 2016       Past Surgical History:   Procedure Laterality Date     HERNIA REPAIR      Right inguinal     IA LAP,INGUINAL HERNIA REPR,INITIAL Left 2020    Procedure: HERNIORRHAPHY, INGUINAL, LAPAROSCOPIC;  Surgeon: Jareth Julian MD;  [facility];  Service: General       Family History   Problem Relation Age of Onset     Heart Disease Mother      Heart Disease Father        Social History     Tobacco Use     Smoking status: Former Smoker     Types: Cigars, Cigars, Cigars     Quit date: 3/12/2019     Years since quittin.5     Smokeless  "tobacco: Never Used   Substance Use Topics     Alcohol use: Yes     Drug use: Not Currently       citalopram (CELEXA) 20 MG tablet  etanercept (ENBREL) 50 MG/ML injection  LIPITOR 20 MG PO TABS  PRILOSEC OTC OR          Physical Exam     First Vitals:  Patient Vitals for the past 24 hrs:   BP Temp Temp src Pulse Resp SpO2 Height Weight   09/17/21 1727 (!) 159/100 98.5  F (36.9  C) Oral 60 18 97 % 1.822 m (5' 11.75\") 99.8 kg (220 lb)         PHYSICAL EXAM:   Physical Exam  Vitals and nursing note reviewed.   Constitutional:       General: He is not in acute distress.     Appearance: Normal appearance. He is not ill-appearing or toxic-appearing.   Musculoskeletal:      Comments: Left 2nd digit: 5cm macerated complex subcutaneous laceration overlying the DIP joint dorsally. Wound extends into the joint - though no evidence of tendon rupture. No debris in the joint though all fingers are dirty in appearance. Able to flex at the DIP though not able to fully extend. Distal sensory and cap refill intact.   Left 3rd digit: 3cm liner laceration overlying PIP joint without active bleeding and no evidence of retained FB. Full active and passive ROM of the digits at all joints without difficulty. Distal cap refill and sensory exam intact. 2+ radial pulses.    Skin:     General: Skin is warm and dry.   Neurological:      Mental Status: He is alert.             Results     RADIOLOGY:  XR Hand Left G/E 3 Views   Final Result   IMPRESSION: Soft tissue bandaging over the distal aspect of the index and middle fingers. There is an osseous defect in the radial head of the index finger middle phalanx with a a few small osseous fragments in the subcutaneous tissues at the radial    margin and ulnar to the distal phalanx. Degenerative changes in the DIP joint of the middle finger. Degenerative changes in the first CMC joint.            PROCEDURES:    PROCEDURE: Digital Block   INDICATIONS: Lacerations    PROCEDURE PROVIDER: Giana" NICHOLAS Austin   SITE: Left index finger (2nd digit) and middle finger (3rd digit)   MEDICATION: 6 mL (3 mL per digit) of 1% Lidocaine without epinephrine   NOTE: The skin overlying the site for injection was prepped with chlorhexidine.  Needle was inserted in a standard three point injection pattern.  Each time the area was aspirated and there was no return of blood.  I then injected the medication at the base of the digit.  The patient had a good response to the procedure   COMPLICATIONS: None       PROCEDURE: Laceration Repair   INDICATIONS: Laceration   PROCEDURE PROVIDER:  Soumya Austin PA-C   SITE: 2nd and 3rd digits    TYPE/SIZE: complex, subcutaneous, contaminated and no foreign body visualized  3rd digit - linear subcutaneous 3 cm laceration overlying PIP joint.   2nd digit - complex subcutaneous 5 cm laceration overlying the DIP joint. No FB visualized in wound, those all fingers appear dirty.    FUNCTIONAL ASSESSMENT: 3rd digit: Distal sensation, cap refill, motor and tendon function intact.   2nd digit: Distal sensation and cap refill intact. Inability to completely extend at the DIP joint.    MEDICATION: See digital block note   PREPARATION: irrigation with Normal saline   DEBRIDEMENT: wound explored, no foreign body found and minimal debridement   CLOSURE:  Wound was closed:   3rd digit - one layer closure. Skin closed with 4-0 Ethilon using 3 interrupted sutures.   2nd digit - one layer closure. Loosely closed using 2 interrupted 4-0 Ethilon sutures.     Total number of sutures/staples placed: 5       I, Randee Callejas, am serving as a scribe to document services personally performed by Giana Austin PA-C, based on my observation and the provider's statements to me. I, Giana Austin PA-C attest that Randee Callejas is acting in a scribe capacity, has observed my performance of the services and has documented them in accordance with my direction.       Giana Austin PA-C   Emergency Medicine   [facility]  [facility] EMERGENCY ROOM      Giana Austin PA-C  09/18/21 0004

## 2021-09-17 NOTE — ED TRIAGE NOTES
Left index and middle fingers injured by chainsaw, pt saw primary care and sent here because he can't straighten index finger.  Wrapped and bleeding controlled for now.

## 2021-09-17 NOTE — ED PROVIDER NOTES
EMERGENCY DEPARTMENT ENCOUNTER       ED Course & Medical Decision Making     ***Met with patient for initial interview and exam. Discussed initial plan for care for their stay in the emergency department.    ***      Prior to making a final disposition on this patient the results of patient's tests and other diagnostic studies were discussed with the patient. All questions were answered. Patient expressed understanding of the plan and was amenable to it.    Medications - No data to display    Final Impression     No diagnosis found.        Chief Complaint     Chief Complaint   Patient presents with     Hand Injury       Left index and middle fingers injured by chainsaw, pt saw primary care and sent here because he can't straighten index finger.  Wrapped and bleeding controlled for now.      HPI     Kai Jim is a 61 year old male with history of RA and hyperlipidemia who presents for evaluation of a hand injury.        I, Samaria Obrien am serving as a scribe to document services personally performed by Jared Petersen M.D. based on my observation and the provider's statements to me. I, Jared Petersen M.D attest that Samaria Obrien is acting in a scribe capacity, has observed my performance of the services and has documented them in accordance with my direction.    Past Medical History     Past Medical History:   Diagnosis Date     Psoriatic arthritis (H) 2016     Past Surgical History:   Procedure Laterality Date     HERNIA REPAIR      Right inguinal     ID LAP,INGUINAL HERNIA REPR,INITIAL Left 2020    Procedure: HERNIORRHAPHY, INGUINAL, LAPAROSCOPIC;  Surgeon: Jareth Julian MD;  Location: [facility];  Service: General     Family History   Problem Relation Age of Onset     Heart Disease Mother      Heart Disease Father       Social History     Tobacco Use     Smoking status: Former Smoker     Types: Cigars, Cigars, Cigars     Quit date: 3/12/2019     Years since quittin.5      "Smokeless tobacco: Never Used   Substance Use Topics     Alcohol use: Yes     Drug use: Not Currently       Relevant past medical, surgical, family and social history as documented above, has been reviewed and discussed with patient. No changes or additions, unless otherwise noted in the HPI.    Current Medications     citalopram (CELEXA) 20 MG tablet  etanercept (ENBREL) 50 MG/ML injection  LIPITOR 20 MG PO TABS  PRILOSEC OTC OR        Allergies     No Known Allergies    Review of Systems     Review of Systems     Remainder of systems reviewed, unless noted in HPI all others negative.    Physical Exam     BP (!) 159/100   Pulse 60   Temp 98.5  F (36.9  C) (Oral)   Resp 18   Ht 1.822 m (5' 11.75\")   Wt 99.8 kg (220 lb)   SpO2 97%   BMI 30.05 kg/m      Physical Exam        Labs & Imaging         Labs Ordered and Resulted from Time of ED Arrival Up to the Time of Departure from the ED - No data to display               "

## 2021-09-18 NOTE — DISCHARGE INSTRUCTIONS
As we discussed, we have started you on the antibiotic Keflex to prevent infection, however please closely monitor your wounds for any increased pain, swelling, redness, or puslike discharge and return to the ED if this develops.  You will follow-up with [facility] on Monday -please call to schedule this follow-up appointment.  Please wear the finger splint until your follow-up appointment.  You may alternate between Tylenol and Motrin for pain and I will give you several tablets of Percocet which is oxycodone with Tylenol for breakthrough pain.  This is a strong pain medication can make you drowsy.  Please do not take this while working, driving, or operating heavy machinery.    We have placed 3 stitches in the middle finger and 2 stitches in the pointer finger which should be removed in 10 to 14 days.

## 2021-09-20 ENCOUNTER — TRANSFERRED RECORDS (OUTPATIENT)
Dept: HEALTH INFORMATION MANAGEMENT | Facility: CLINIC | Age: 61
End: 2021-09-20

## 2021-09-21 ENCOUNTER — OFFICE VISIT (OUTPATIENT)
Dept: RHEUMATOLOGY | Facility: CLINIC | Age: 61
End: 2021-09-21
Payer: COMMERCIAL

## 2021-09-21 VITALS
WEIGHT: 222.6 LBS | SYSTOLIC BLOOD PRESSURE: 160 MMHG | BODY MASS INDEX: 30.4 KG/M2 | DIASTOLIC BLOOD PRESSURE: 80 MMHG | HEART RATE: 78 BPM

## 2021-09-21 DIAGNOSIS — M15.0 PRIMARY OSTEOARTHRITIS INVOLVING MULTIPLE JOINTS: ICD-10-CM

## 2021-09-21 DIAGNOSIS — Z79.899 HIGH RISK MEDICATION USE: ICD-10-CM

## 2021-09-21 DIAGNOSIS — L40.9 PSORIASIS: ICD-10-CM

## 2021-09-21 DIAGNOSIS — L40.50 PSORIATIC ARTHRITIS (H): Primary | ICD-10-CM

## 2021-09-21 PROCEDURE — 99214 OFFICE O/P EST MOD 30 MIN: CPT | Performed by: INTERNAL MEDICINE

## 2021-09-21 RX ORDER — LEFLUNOMIDE 20 MG/1
TABLET ORAL
Qty: 90 TABLET | Refills: 0 | Status: SHIPPED | OUTPATIENT
Start: 2021-09-21 | End: 2022-01-11

## 2021-09-21 RX ORDER — LEFLUNOMIDE 20 MG/1
TABLET ORAL
COMMUNITY
Start: 2021-06-30 | End: 2021-09-21

## 2021-09-21 NOTE — PROGRESS NOTES
Rheumatology follow-up visit note     Kai is a 61 year old male presents today for follow-up.    Kai was seen today for recheck.    Diagnoses and all orders for this visit:    Psoriatic arthritis (H)  -     leflunomide (ARAVA) 20 MG tablet; 1 tab(s)    Primary osteoarthritis involving multiple joints    Psoriasis  -     leflunomide (ARAVA) 20 MG tablet; 1 tab(s)    High risk medication use        This patient with psoriatic arthritis, psoriasis, osteoarthritis, had done so well with combination of leflunomide and Otezla as far as the symptoms are concerned however intolerable GI side effects led him to stop Otezla.  The symptoms are resolved since.  So far leflunomide seems to be holding.  He has pain in his left hand where he had injury for the chainsaw.  He is going to stay on leflunomide.  This time will meet with sooner instead of the usual 6 months in 3 months given the change.    Follow up in 3 months.  Labs prior.    HPI    Kai Jim is a 61 year old male is here for follow-up of He has psoriatic arthritis.  He has psoriasis.  He has widespread osteoarthritis.  Doing great with the current combination of Otezla and leflunomide 20 mg daily.  He noted pain level to be 0.5/10.  No residual GI symptoms.  His recent labs are entirely normal.  During her recent visit he had corticosteroid injection in his knee that is done well.   With Otezla no precipitation of depression. Used to be on Enbrel for several years before insurance dropped to the years ago.  He has  family history of rheumatoid arthritis in father.   He is not a smoker alcohol occasionally.   He injured his right index and middle finger chopping wood many years ago.  That was in 1986.  He feels that Otezla has caused him significant GI side effects and he has stopped taking it 2 weeks ago.  Prior to that on 2 occasions he held it and resumed at a lower dose each time he has intolerable diarrhea.  Although he was so happy with the  Otezla leflunomide combination that it was in his opinion better than Enbrel that he used to take many years ago given that with Enbrel his psoriasis did not quite fully clear that was the case.  Otezla and leflunomide combination.  His recent labs are normal.  He injured his left hand and a chainsaw.       He is fully vaccinated for COVID-19.        DETAILED EXAMINATION  09/21/21  :    Vitals:    09/21/21 0758   BP: (!) 160/80   Pulse: 78   Weight: 101 kg (222 lb 9.6 oz)     Alert oriented. Head including the face is examined for malar rash, heliotropes, scarring, lupus pernio. Eyes examined for redness such as in episcleritis/scleritis, periorbital lesions.   Neck/ Face examined for parotid gland swelling, range of motion of neck.  Left upper and lower and right upper and lower extremities examined for tenderness, swelling, warmth of the appendicular joints, range of motion, edema, rash.  Some of the important findings included: he does not have evidence of synovitis in the palpable joints of the upper extremities.  No significant deformities of the digits.  No Heberden nodes.  Range of motion of the shoulders  show full abduction.  No JLT effusion or warmth of the knees.  he does not have dactylitis of the digits.     Patient Active Problem List    Diagnosis Date Noted     Moderate major depression (H) 12/22/2010     Priority: Medium     Anxiety 12/22/2010     Priority: Medium     CARDIOVASCULAR SCREENING; LDL GOAL LESS THAN 130 10/31/2010     Priority: Medium     Past Surgical History:   Procedure Laterality Date     HERNIA REPAIR  1991    Right inguinal     SC LAP,INGUINAL HERNIA REPR,INITIAL Left 12/17/2020    Procedure: HERNIORRHAPHY, INGUINAL, LAPAROSCOPIC;  Surgeon: Jareth Julian MD;  Location: [facility];  Service: General      Past Medical History:   Diagnosis Date     Psoriatic arthritis (H) 1/8/2016     No Known Allergies  Current Outpatient Medications   Medication Sig Dispense Refill      cephALEXin (KEFLEX) 500 MG capsule Take 1 capsule (500 mg) by mouth 4 times daily for 7 days 28 capsule 0     citalopram (CELEXA) 20 MG tablet Take  by mouth daily. 1-2 daily 60 tablet prn     etanercept (ENBREL) 50 MG/ML injection Inject 2 mLs Subcutaneous once a week. 1 vial      LIPITOR 20 MG PO TABS 1 TABLET DAILY       oxyCODONE-acetaminophen (PERCOCET) 5-325 MG tablet Take 1 tablet by mouth every 6 hours as needed for pain 8 tablet 0     PRILOSEC OTC OR None Entered       family history includes Heart Disease in his father and mother.     Social Connections:      Frequency of Communication with Friends and Family:      Frequency of Social Gatherings with Friends and Family:      Attends [employer] Services:      Active Member of Clubs or Organizations:      Attends Club or Organization Meetings:      Marital Status:           WBC Count   Date Value Ref Range Status   09/14/2021 7.1 4.0 - 11.0 10e3/uL Final     RBC Count   Date Value Ref Range Status   09/14/2021 5.04 4.40 - 5.90 10e6/uL Final     Hemoglobin   Date Value Ref Range Status   09/14/2021 14.6 13.3 - 17.7 g/dL Final     Hematocrit   Date Value Ref Range Status   09/14/2021 44.8 40.0 - 53.0 % Final     MCV   Date Value Ref Range Status   09/14/2021 89 78 - 100 fL Final     MCH   Date Value Ref Range Status   09/14/2021 29.0 26.5 - 33.0 pg Final     Platelet Count   Date Value Ref Range Status   09/14/2021 261 150 - 450 10e3/uL Final     % Lymphocytes   Date Value Ref Range Status   04/17/2018 20 20 - 40 % Final     ALT   Date Value Ref Range Status   09/14/2021 14 0 - 45 U/L Final     Albumin   Date Value Ref Range Status   09/14/2021 3.6 3.5 - 5.0 g/dL Final     Creatinine   Date Value Ref Range Status   09/14/2021 0.87 0.70 - 1.30 mg/dL Final     GFR Estimate   Date Value Ref Range Status   09/14/2021 >90 >60 mL/min/1.73m2 Final     Comment:     As of July 11, 2021, eGFR is calculated by the CKD-EPI creatinine equation, without race adjustment. eGFR  can be influenced by muscle mass, exercise, and diet. The reported eGFR is an estimation only and is only applicable if the renal function is stable.   06/29/2021 >60 >60 mL/min/1.73m2 Final     GFR Estimate If Black   Date Value Ref Range Status   06/29/2021 >60 >60 mL/min/1.73m2 Final     Erythrocyte Sedimentation Rate   Date Value Ref Range Status   04/17/2018 15 0 - 15 mm/hr Final     CRP   Date Value Ref Range Status   04/17/2018 0.7 0.0 - 0.8 mg/dL Final

## 2021-09-28 ENCOUNTER — TRANSFERRED RECORDS (OUTPATIENT)
Dept: HEALTH INFORMATION MANAGEMENT | Facility: CLINIC | Age: 61
End: 2021-09-28

## 2021-10-17 ENCOUNTER — HEALTH MAINTENANCE LETTER (OUTPATIENT)
Age: 61
End: 2021-10-17

## 2021-11-30 ENCOUNTER — DOCUMENTATION ONLY (OUTPATIENT)
Dept: LAB | Facility: CLINIC | Age: 61
End: 2021-11-30
Payer: COMMERCIAL

## 2021-11-30 DIAGNOSIS — L40.50 PSORIATIC ARTHRITIS (H): Primary | ICD-10-CM

## 2021-11-30 NOTE — PROGRESS NOTES
Patient has an upcoming appointment for Dr. Huff's labs.  No orders are currently in the chart.     Please place orders.    Thanks

## 2021-12-19 DIAGNOSIS — L40.9 PSORIASIS: ICD-10-CM

## 2021-12-19 DIAGNOSIS — L40.50 PSORIATIC ARTHRITIS (H): ICD-10-CM

## 2021-12-22 NOTE — TELEPHONE ENCOUNTER
Called and discussed with pt-his wife actually just changed jobs, and as a result, they are in the middle of changing from one health insurance company to another. They also have a short gap of time where they have no health insurance at all--from now until January 1st, 2022.     This was the reason why pt had to move his lab appt to January 7th--he reports that he has enough medication to last until that time, so he is fine with waiting until Dr. Huff's office gets the results of his lab work on 1/7/22 before they fill this medication.    - - -

## 2021-12-27 RX ORDER — LEFLUNOMIDE 20 MG/1
TABLET ORAL
Qty: 90 TABLET | Refills: 0 | OUTPATIENT
Start: 2021-12-27

## 2022-01-07 ENCOUNTER — LAB (OUTPATIENT)
Dept: LAB | Facility: CLINIC | Age: 62
End: 2022-01-07
Payer: COMMERCIAL

## 2022-01-07 DIAGNOSIS — L40.50 PSORIATIC ARTHRITIS (H): ICD-10-CM

## 2022-01-07 LAB
ALBUMIN SERPL-MCNC: 3.9 G/DL (ref 3.5–5)
ALT SERPL W P-5'-P-CCNC: 19 U/L (ref 0–45)
CREAT SERPL-MCNC: 0.91 MG/DL (ref 0.7–1.3)
ERYTHROCYTE [DISTWIDTH] IN BLOOD BY AUTOMATED COUNT: 13.5 % (ref 10–15)
GFR SERPL CREATININE-BSD FRML MDRD: >90 ML/MIN/1.73M2
HCT VFR BLD AUTO: 46.5 % (ref 40–53)
HGB BLD-MCNC: 15.3 G/DL (ref 13.3–17.7)
MCH RBC QN AUTO: 29.2 PG (ref 26.5–33)
MCHC RBC AUTO-ENTMCNC: 32.9 G/DL (ref 31.5–36.5)
MCV RBC AUTO: 89 FL (ref 78–100)
PLATELET # BLD AUTO: 270 10E3/UL (ref 150–450)
RBC # BLD AUTO: 5.24 10E6/UL (ref 4.4–5.9)
WBC # BLD AUTO: 7.1 10E3/UL (ref 4–11)

## 2022-01-07 PROCEDURE — 85027 COMPLETE CBC AUTOMATED: CPT

## 2022-01-07 PROCEDURE — 82565 ASSAY OF CREATININE: CPT

## 2022-01-07 PROCEDURE — 82040 ASSAY OF SERUM ALBUMIN: CPT

## 2022-01-07 PROCEDURE — 36415 COLL VENOUS BLD VENIPUNCTURE: CPT

## 2022-01-07 PROCEDURE — 84460 ALANINE AMINO (ALT) (SGPT): CPT

## 2022-01-11 ENCOUNTER — OFFICE VISIT (OUTPATIENT)
Dept: RHEUMATOLOGY | Facility: CLINIC | Age: 62
End: 2022-01-11
Payer: COMMERCIAL

## 2022-01-11 VITALS
SYSTOLIC BLOOD PRESSURE: 150 MMHG | BODY MASS INDEX: 31.27 KG/M2 | DIASTOLIC BLOOD PRESSURE: 90 MMHG | HEART RATE: 64 BPM | WEIGHT: 229 LBS

## 2022-01-11 DIAGNOSIS — Z79.899 HIGH RISK MEDICATION USE: ICD-10-CM

## 2022-01-11 DIAGNOSIS — L40.9 PSORIASIS: ICD-10-CM

## 2022-01-11 DIAGNOSIS — M15.0 PRIMARY OSTEOARTHRITIS INVOLVING MULTIPLE JOINTS: ICD-10-CM

## 2022-01-11 DIAGNOSIS — L40.50 PSORIATIC ARTHRITIS (H): Primary | ICD-10-CM

## 2022-01-11 PROCEDURE — 99214 OFFICE O/P EST MOD 30 MIN: CPT | Performed by: INTERNAL MEDICINE

## 2022-01-11 RX ORDER — LEFLUNOMIDE 20 MG/1
TABLET ORAL
Qty: 90 TABLET | Refills: 0 | Status: SHIPPED | OUTPATIENT
Start: 2022-01-11 | End: 2022-04-12

## 2022-01-11 NOTE — PROGRESS NOTES
Rheumatology follow-up visit note     Kai is a 61 year old male presents today for follow-up.    Kai was seen today for recheck.    Diagnoses and all orders for this visit:    Psoriatic arthritis (H)  -     leflunomide (ARAVA) 20 MG tablet; 1 tab(s)    Psoriasis  -     leflunomide (ARAVA) 20 MG tablet; 1 tab(s)    Primary osteoarthritis involving multiple joints    High risk medication use            Follow up in 6 months.    HPI      Kai Jim is a 61 year old male is here for follow-up of He has psoriatic arthritis.  He has psoriasis.  He has widespread osteoarthritis.  Doing great with  leflunomide 20 mg daily.  He noted pain level to be 0.5/10.  No residual GI symptoms.  His recent labs are entirely normal. Unfortunately he got injured using a chainsaw rupturing his tendon of the left index finger extensor and injuring the left middle finger as well this was in October he has made good recovery except that there is flexion deformity now at the left index DIP. His knee troubles him off and on. He is contemplating seeing orthopedic surgery. In the past corticosteroid injections have been helpful.  Used to be on Enbrel for several years before insurance dropped to the years ago.  Otezla was associated with gastrointestinal symptoms.  He has  family history of rheumatoid arthritis in father.   He is not a smoker alcohol occasionally.   He injured his right index and middle finger chopping wood many years ago.  That was in 1986.    He injured his left hand and a chainsaw.       He is fully vaccinated for COVID-19.       DETAILED EXAMINATION  01/11/22  :    Vitals:    01/11/22 1407   BP: (!) 150/90   Pulse: 64   Weight: 103.9 kg (229 lb)     Alert oriented. Head including the face is examined for malar rash, heliotropes, scarring, lupus pernio. Eyes examined for redness such as in episcleritis/scleritis, periorbital lesions.   Neck/ Face examined for parotid gland swelling, range of motion of neck.   Left upper and lower and right upper and lower extremities examined for tenderness, swelling, warmth of the appendicular joints, range of motion, edema, rash.  Some of the important findings included: he does not have evidence of synovitis in the palpable joints of the upper extremities.  No significant deformities of the digits.  + Heberden nodes.  Range of motion of the shoulders  show full abduction.  No JLT effusion or warmth of the knees.  he does not have dactylitis of the digits. 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     Patient Active Problem List    Diagnosis Date Noted     Moderate major depression (H) 12/22/2010     Priority: Medium     Anxiety 12/22/2010     Priority: Medium     CARDIOVASCULAR SCREENING; LDL GOAL LESS THAN 130 10/31/2010     Priority: Medium     Past Surgical History:   Procedure Laterality Date     HERNIA REPAIR  1991    Right inguinal     [place] LAP,INGUINAL HERNIA REPR,INITIAL Left 12/17/2020    Procedure: HERNIORRHAPHY, INGUINAL, LAPAROSCOPIC;  Surgeon: Jareth Julian MD;  Location: [facility];  Service: General      Past Medical History:   Diagnosis Date     Psoriatic arthritis (H) 1/8/2016     No Known Allergies  Current Outpatient Medications   Medication Sig Dispense Refill     citalopram (CELEXA) 20 MG tablet Take  by mouth daily. 1-2 daily 60 tablet prn     leflunomide (ARAVA) 20 MG tablet 1 tab(s) 90 tablet 0     LIPITOR 20 MG PO TABS 1 TABLET DAILY       Multiple Vitamin (MULTI VITAMIN) TABS 1 tab(s)       PRILOSEC OTC OR None Entered       family history includes Heart Disease in his father and mother.  Social Connections: Not on file          WBC Count   Date Value Ref Range Status   01/07/2022 7.1 4.0 - 11.0 10e3/uL Final     RBC Count   Date Value Ref Range Status   01/07/2022 5.24 4.40 - 5.90 10e6/uL Final     Hemoglobin   Date  Value Ref Range Status   01/07/2022 15.3 13.3 - 17.7 g/dL Final     Hematocrit   Date Value Ref Range Status   01/07/2022 46.5 40.0 - 53.0 % Final     MCV   Date Value Ref Range Status   01/07/2022 89 78 - 100 fL Final     MCH   Date Value Ref Range Status   01/07/2022 29.2 26.5 - 33.0 pg Final     Platelet Count   Date Value Ref Range Status   01/07/2022 270 150 - 450 10e3/uL Final     % Lymphocytes   Date Value Ref Range Status   04/17/2018 20 20 - 40 % Final     ALT   Date Value Ref Range Status   01/07/2022 19 0 - 45 U/L Final     Albumin   Date Value Ref Range Status   01/07/2022 3.9 3.5 - 5.0 g/dL Final     Creatinine   Date Value Ref Range Status   01/07/2022 0.91 0.70 - 1.30 mg/dL Final     GFR Estimate   Date Value Ref Range Status   01/07/2022 >90 >60 mL/min/1.73m2 Final     Comment:     Effective December 21, 2021 eGFRcr in adults is calculated using the 2021 CKD-EPI creatinine equation which includes age and gender (Roseline et al., NEJM, DOI: 10.1056/QBGEmr5684301)   06/29/2021 >60 >60 mL/min/1.73m2 Final     GFR Estimate If Black   Date Value Ref Range Status   06/29/2021 >60 >60 mL/min/1.73m2 Final     Erythrocyte Sedimentation Rate   Date Value Ref Range Status   04/17/2018 15 0 - 15 mm/hr Final     CRP   Date Value Ref Range Status   04/17/2018 0.7 0.0 - 0.8 mg/dL Final

## 2022-02-03 ENCOUNTER — LAB REQUISITION (OUTPATIENT)
Dept: LAB | Facility: CLINIC | Age: 62
End: 2022-02-03

## 2022-02-03 DIAGNOSIS — N28.1 CYST OF KIDNEY, ACQUIRED: ICD-10-CM

## 2022-02-03 DIAGNOSIS — R97.20 ELEVATED PROSTATE SPECIFIC ANTIGEN (PSA): ICD-10-CM

## 2022-02-03 LAB
ANION GAP SERPL CALCULATED.3IONS-SCNC: 13 MMOL/L (ref 5–18)
BUN SERPL-MCNC: 13 MG/DL (ref 8–22)
CALCIUM SERPL-MCNC: 9 MG/DL (ref 8.5–10.5)
CHLORIDE BLD-SCNC: 108 MMOL/L (ref 98–107)
CO2 SERPL-SCNC: 21 MMOL/L (ref 22–31)
CREAT SERPL-MCNC: 0.81 MG/DL (ref 0.7–1.3)
GFR SERPL CREATININE-BSD FRML MDRD: >90 ML/MIN/1.73M2
GLUCOSE BLD-MCNC: 89 MG/DL (ref 70–125)
POTASSIUM BLD-SCNC: 4 MMOL/L (ref 3.5–5)
PSA SERPL-MCNC: 4.44 UG/L (ref 0–4.5)
SODIUM SERPL-SCNC: 142 MMOL/L (ref 136–145)

## 2022-02-03 PROCEDURE — G0103 PSA SCREENING: HCPCS | Performed by: PHYSICIAN ASSISTANT

## 2022-02-03 PROCEDURE — 80048 BASIC METABOLIC PNL TOTAL CA: CPT | Performed by: PHYSICIAN ASSISTANT

## 2022-03-29 DIAGNOSIS — Z11.59 ENCOUNTER FOR SCREENING FOR OTHER VIRAL DISEASES: Primary | ICD-10-CM

## 2022-04-01 ENCOUNTER — LAB REQUISITION (OUTPATIENT)
Dept: LAB | Facility: CLINIC | Age: 62
End: 2022-04-01

## 2022-04-01 DIAGNOSIS — Z01.818 ENCOUNTER FOR OTHER PREPROCEDURAL EXAMINATION: ICD-10-CM

## 2022-04-01 LAB
ANION GAP SERPL CALCULATED.3IONS-SCNC: 12 MMOL/L (ref 5–18)
BUN SERPL-MCNC: 15 MG/DL (ref 8–22)
CALCIUM SERPL-MCNC: 9.2 MG/DL (ref 8.5–10.5)
CHLORIDE BLD-SCNC: 106 MMOL/L (ref 98–107)
CO2 SERPL-SCNC: 25 MMOL/L (ref 22–31)
CREAT SERPL-MCNC: 0.95 MG/DL (ref 0.7–1.3)
GFR SERPL CREATININE-BSD FRML MDRD: >90 ML/MIN/1.73M2
GLUCOSE BLD-MCNC: 89 MG/DL (ref 70–125)
POTASSIUM BLD-SCNC: 4.1 MMOL/L (ref 3.5–5)
SODIUM SERPL-SCNC: 143 MMOL/L (ref 136–145)

## 2022-04-01 PROCEDURE — 82310 ASSAY OF CALCIUM: CPT | Performed by: PHYSICIAN ASSISTANT

## 2022-04-04 ENCOUNTER — LAB (OUTPATIENT)
Dept: LAB | Facility: CLINIC | Age: 62
End: 2022-04-04
Attending: UROLOGY
Payer: COMMERCIAL

## 2022-04-04 DIAGNOSIS — Z11.59 ENCOUNTER FOR SCREENING FOR OTHER VIRAL DISEASES: ICD-10-CM

## 2022-04-04 PROCEDURE — U0005 INFEC AGEN DETEC AMPLI PROBE: HCPCS

## 2022-04-04 PROCEDURE — U0003 INFECTIOUS AGENT DETECTION BY NUCLEIC ACID (DNA OR RNA); SEVERE ACUTE RESPIRATORY SYNDROME CORONAVIRUS 2 (SARS-COV-2) (CORONAVIRUS DISEASE [COVID-19]), AMPLIFIED PROBE TECHNIQUE, MAKING USE OF HIGH THROUGHPUT TECHNOLOGIES AS DESCRIBED BY CMS-2020-01-R: HCPCS

## 2022-04-04 RX ORDER — SILDENAFIL 50 MG/1
50-100 TABLET, FILM COATED ORAL DAILY PRN
COMMUNITY

## 2022-04-04 RX ORDER — AMOXICILLIN 875 MG
TABLET ORAL EVERY 12 HOURS
Status: ON HOLD | COMMUNITY
End: 2022-04-06

## 2022-04-05 LAB — SARS-COV-2 RNA RESP QL NAA+PROBE: NEGATIVE

## 2022-04-06 ENCOUNTER — HOSPITAL ENCOUNTER (INPATIENT)
Facility: HOSPITAL | Age: 62
LOS: 1 days | Discharge: HOME | DRG: 657 | End: 2022-04-08
Attending: UROLOGY | Admitting: UROLOGY
Payer: COMMERCIAL

## 2022-04-06 ENCOUNTER — ANESTHESIA EVENT (OUTPATIENT)
Dept: SURGERY | Facility: HOSPITAL | Age: 62
DRG: 657 | End: 2022-04-06
Payer: COMMERCIAL

## 2022-04-06 ENCOUNTER — ANESTHESIA (OUTPATIENT)
Dept: SURGERY | Facility: HOSPITAL | Age: 62
DRG: 657 | End: 2022-04-06
Payer: COMMERCIAL

## 2022-04-06 DIAGNOSIS — N28.89 RENAL MASS: Primary | ICD-10-CM

## 2022-04-06 LAB
HGB BLD-MCNC: 14.6 G/DL (ref 13.3–17.7)
HOLD SPECIMEN: NORMAL
PLATELET # BLD AUTO: 234 10E3/UL (ref 150–450)

## 2022-04-06 PROCEDURE — 85018 HEMOGLOBIN: CPT | Performed by: EMERGENCY MEDICINE

## 2022-04-06 PROCEDURE — 258N000003 HC RX IP 258 OP 636: Performed by: NURSE ANESTHETIST, CERTIFIED REGISTERED

## 2022-04-06 PROCEDURE — 258N000003 HC RX IP 258 OP 636: Performed by: ANESTHESIOLOGY

## 2022-04-06 PROCEDURE — 258N000003 HC RX IP 258 OP 636: Performed by: UROLOGY

## 2022-04-06 PROCEDURE — 250N000009 HC RX 250: Performed by: NURSE ANESTHETIST, CERTIFIED REGISTERED

## 2022-04-06 PROCEDURE — 8E0W4CZ ROBOTIC ASSISTED PROCEDURE OF TRUNK REGION, PERCUTANEOUS ENDOSCOPIC APPROACH: ICD-10-PCS | Performed by: UROLOGY

## 2022-04-06 PROCEDURE — 250N000009 HC RX 250: Performed by: UROLOGY

## 2022-04-06 PROCEDURE — 272N000001 HC OR GENERAL SUPPLY STERILE: Performed by: UROLOGY

## 2022-04-06 PROCEDURE — 36415 COLL VENOUS BLD VENIPUNCTURE: CPT | Performed by: UROLOGY

## 2022-04-06 PROCEDURE — 360N000080 HC SURGERY LEVEL 7, PER MIN: Performed by: UROLOGY

## 2022-04-06 PROCEDURE — 250N000025 HC SEVOFLURANE, PER MIN: Performed by: UROLOGY

## 2022-04-06 PROCEDURE — 250N000011 HC RX IP 250 OP 636: Performed by: NURSE ANESTHETIST, CERTIFIED REGISTERED

## 2022-04-06 PROCEDURE — 88307 TISSUE EXAM BY PATHOLOGIST: CPT | Mod: TC | Performed by: UROLOGY

## 2022-04-06 PROCEDURE — 250N000011 HC RX IP 250 OP 636: Performed by: ANESTHESIOLOGY

## 2022-04-06 PROCEDURE — 370N000017 HC ANESTHESIA TECHNICAL FEE, PER MIN: Performed by: UROLOGY

## 2022-04-06 PROCEDURE — 36415 COLL VENOUS BLD VENIPUNCTURE: CPT | Performed by: EMERGENCY MEDICINE

## 2022-04-06 PROCEDURE — 250N000013 HC RX MED GY IP 250 OP 250 PS 637: Performed by: UROLOGY

## 2022-04-06 PROCEDURE — 88307 TISSUE EXAM BY PATHOLOGIST: CPT | Mod: 26 | Performed by: PATHOLOGY

## 2022-04-06 PROCEDURE — P9041 ALBUMIN (HUMAN),5%, 50ML: HCPCS | Performed by: NURSE ANESTHETIST, CERTIFIED REGISTERED

## 2022-04-06 PROCEDURE — 710N000009 HC RECOVERY PHASE 1, LEVEL 1, PER MIN: Performed by: UROLOGY

## 2022-04-06 PROCEDURE — 999N000141 HC STATISTIC PRE-PROCEDURE NURSING ASSESSMENT: Performed by: UROLOGY

## 2022-04-06 PROCEDURE — 85049 AUTOMATED PLATELET COUNT: CPT | Performed by: UROLOGY

## 2022-04-06 PROCEDURE — 250N000011 HC RX IP 250 OP 636: Performed by: EMERGENCY MEDICINE

## 2022-04-06 PROCEDURE — 0TT04ZZ RESECTION OF RIGHT KIDNEY, PERCUTANEOUS ENDOSCOPIC APPROACH: ICD-10-PCS | Performed by: UROLOGY

## 2022-04-06 RX ORDER — SODIUM CHLORIDE, SODIUM LACTATE, POTASSIUM CHLORIDE, CALCIUM CHLORIDE 600; 310; 30; 20 MG/100ML; MG/100ML; MG/100ML; MG/100ML
INJECTION, SOLUTION INTRAVENOUS CONTINUOUS
Status: DISCONTINUED | OUTPATIENT
Start: 2022-04-06 | End: 2022-04-06 | Stop reason: HOSPADM

## 2022-04-06 RX ORDER — HYDROMORPHONE HYDROCHLORIDE 1 MG/ML
0.2 INJECTION, SOLUTION INTRAMUSCULAR; INTRAVENOUS; SUBCUTANEOUS EVERY 5 MIN PRN
Status: DISCONTINUED | OUTPATIENT
Start: 2022-04-06 | End: 2022-04-06 | Stop reason: HOSPADM

## 2022-04-06 RX ORDER — FENTANYL CITRATE 50 UG/ML
25 INJECTION, SOLUTION INTRAMUSCULAR; INTRAVENOUS EVERY 5 MIN PRN
Status: DISCONTINUED | OUTPATIENT
Start: 2022-04-06 | End: 2022-04-06 | Stop reason: HOSPADM

## 2022-04-06 RX ORDER — MORPHINE SULFATE 2 MG/ML
2 INJECTION, SOLUTION INTRAMUSCULAR; INTRAVENOUS
Status: DISCONTINUED | OUTPATIENT
Start: 2022-04-06 | End: 2022-04-08 | Stop reason: HOSPADM

## 2022-04-06 RX ORDER — ONDANSETRON 4 MG/1
4 TABLET, ORALLY DISINTEGRATING ORAL EVERY 30 MIN PRN
Status: DISCONTINUED | OUTPATIENT
Start: 2022-04-06 | End: 2022-04-06 | Stop reason: HOSPADM

## 2022-04-06 RX ORDER — PROPOFOL 10 MG/ML
INJECTION, EMULSION INTRAVENOUS PRN
Status: DISCONTINUED | OUTPATIENT
Start: 2022-04-06 | End: 2022-04-06

## 2022-04-06 RX ORDER — PROPOFOL 10 MG/ML
INJECTION, EMULSION INTRAVENOUS CONTINUOUS PRN
Status: DISCONTINUED | OUTPATIENT
Start: 2022-04-06 | End: 2022-04-06

## 2022-04-06 RX ORDER — OXYCODONE HYDROCHLORIDE 5 MG/1
5 TABLET ORAL EVERY 4 HOURS PRN
Status: DISCONTINUED | OUTPATIENT
Start: 2022-04-06 | End: 2022-04-08

## 2022-04-06 RX ORDER — CEFAZOLIN SODIUM/WATER 2 G/20 ML
2 SYRINGE (ML) INTRAVENOUS SEE ADMIN INSTRUCTIONS
Status: DISCONTINUED | OUTPATIENT
Start: 2022-04-06 | End: 2022-04-06 | Stop reason: HOSPADM

## 2022-04-06 RX ORDER — DEXAMETHASONE SODIUM PHOSPHATE 10 MG/ML
INJECTION, SOLUTION INTRAMUSCULAR; INTRAVENOUS PRN
Status: DISCONTINUED | OUTPATIENT
Start: 2022-04-06 | End: 2022-04-06

## 2022-04-06 RX ORDER — LIDOCAINE HYDROCHLORIDE 20 MG/ML
INJECTION, SOLUTION INFILTRATION; PERINEURAL PRN
Status: DISCONTINUED | OUTPATIENT
Start: 2022-04-06 | End: 2022-04-06

## 2022-04-06 RX ORDER — LIDOCAINE 40 MG/G
CREAM TOPICAL
Status: DISCONTINUED | OUTPATIENT
Start: 2022-04-06 | End: 2022-04-08 | Stop reason: HOSPADM

## 2022-04-06 RX ORDER — MAGNESIUM SULFATE 4 G/50ML
4 INJECTION INTRAVENOUS ONCE
Status: COMPLETED | OUTPATIENT
Start: 2022-04-06 | End: 2022-04-06

## 2022-04-06 RX ORDER — SODIUM CHLORIDE 9 MG/ML
INJECTION, SOLUTION INTRAVENOUS CONTINUOUS PRN
Status: DISCONTINUED | OUTPATIENT
Start: 2022-04-06 | End: 2022-04-06

## 2022-04-06 RX ORDER — ACETAMINOPHEN 325 MG/1
325 TABLET ORAL EVERY 4 HOURS PRN
Status: DISCONTINUED | OUTPATIENT
Start: 2022-04-06 | End: 2022-04-08 | Stop reason: HOSPADM

## 2022-04-06 RX ORDER — ONDANSETRON 2 MG/ML
INJECTION INTRAMUSCULAR; INTRAVENOUS PRN
Status: DISCONTINUED | OUTPATIENT
Start: 2022-04-06 | End: 2022-04-06

## 2022-04-06 RX ORDER — NALOXONE HYDROCHLORIDE 1 MG/ML
0.4 INJECTION INTRAMUSCULAR; INTRAVENOUS; SUBCUTANEOUS
Status: DISCONTINUED | OUTPATIENT
Start: 2022-04-06 | End: 2022-04-08 | Stop reason: HOSPADM

## 2022-04-06 RX ORDER — LIDOCAINE 40 MG/G
CREAM TOPICAL
Status: DISCONTINUED | OUTPATIENT
Start: 2022-04-06 | End: 2022-04-06 | Stop reason: HOSPADM

## 2022-04-06 RX ORDER — ONDANSETRON 2 MG/ML
4 INJECTION INTRAMUSCULAR; INTRAVENOUS EVERY 30 MIN PRN
Status: DISCONTINUED | OUTPATIENT
Start: 2022-04-06 | End: 2022-04-06 | Stop reason: HOSPADM

## 2022-04-06 RX ORDER — HEPARIN SODIUM 5000 [USP'U]/.5ML
5000 INJECTION, SOLUTION INTRAVENOUS; SUBCUTANEOUS EVERY 8 HOURS
Status: DISCONTINUED | OUTPATIENT
Start: 2022-04-07 | End: 2022-04-08 | Stop reason: HOSPADM

## 2022-04-06 RX ORDER — ALBUMIN, HUMAN INJ 5% 5 %
SOLUTION INTRAVENOUS CONTINUOUS PRN
Status: DISCONTINUED | OUTPATIENT
Start: 2022-04-06 | End: 2022-04-06

## 2022-04-06 RX ORDER — SODIUM CHLORIDE, SODIUM LACTATE, POTASSIUM CHLORIDE, CALCIUM CHLORIDE 600; 310; 30; 20 MG/100ML; MG/100ML; MG/100ML; MG/100ML
INJECTION, SOLUTION INTRAVENOUS CONTINUOUS
Status: DISCONTINUED | OUTPATIENT
Start: 2022-04-06 | End: 2022-04-08

## 2022-04-06 RX ORDER — BUPIVACAINE HYDROCHLORIDE 5 MG/ML
INJECTION, SOLUTION PERINEURAL PRN
Status: DISCONTINUED | OUTPATIENT
Start: 2022-04-06 | End: 2022-04-06 | Stop reason: HOSPADM

## 2022-04-06 RX ORDER — FENTANYL CITRATE 50 UG/ML
INJECTION, SOLUTION INTRAMUSCULAR; INTRAVENOUS PRN
Status: DISCONTINUED | OUTPATIENT
Start: 2022-04-06 | End: 2022-04-06

## 2022-04-06 RX ORDER — CHLORAL HYDRATE 500 MG
1 CAPSULE ORAL DAILY
COMMUNITY
End: 2024-02-15

## 2022-04-06 RX ORDER — DOCUSATE SODIUM 100 MG/1
100 CAPSULE, LIQUID FILLED ORAL 2 TIMES DAILY
Status: DISCONTINUED | OUTPATIENT
Start: 2022-04-06 | End: 2022-04-08 | Stop reason: HOSPADM

## 2022-04-06 RX ORDER — CEFAZOLIN SODIUM/WATER 2 G/20 ML
2 SYRINGE (ML) INTRAVENOUS
Status: COMPLETED | OUTPATIENT
Start: 2022-04-06 | End: 2022-04-06

## 2022-04-06 RX ORDER — OXYCODONE HYDROCHLORIDE 5 MG/1
5 TABLET ORAL EVERY 4 HOURS PRN
Status: DISCONTINUED | OUTPATIENT
Start: 2022-04-06 | End: 2022-04-06 | Stop reason: HOSPADM

## 2022-04-06 RX ORDER — NALOXONE HYDROCHLORIDE 1 MG/ML
0.2 INJECTION INTRAMUSCULAR; INTRAVENOUS; SUBCUTANEOUS
Status: DISCONTINUED | OUTPATIENT
Start: 2022-04-06 | End: 2022-04-08 | Stop reason: HOSPADM

## 2022-04-06 RX ORDER — GLYCOPYRROLATE 0.2 MG/ML
INJECTION, SOLUTION INTRAMUSCULAR; INTRAVENOUS PRN
Status: DISCONTINUED | OUTPATIENT
Start: 2022-04-06 | End: 2022-04-06

## 2022-04-06 RX ORDER — EPHEDRINE SULFATE 50 MG/ML
INJECTION, SOLUTION INTRAMUSCULAR; INTRAVENOUS; SUBCUTANEOUS PRN
Status: DISCONTINUED | OUTPATIENT
Start: 2022-04-06 | End: 2022-04-06

## 2022-04-06 RX ADMIN — Medication 10 MG: at 14:30

## 2022-04-06 RX ADMIN — SODIUM CHLORIDE, POTASSIUM CHLORIDE, SODIUM LACTATE AND CALCIUM CHLORIDE: 600; 310; 30; 20 INJECTION, SOLUTION INTRAVENOUS at 17:41

## 2022-04-06 RX ADMIN — FENTANYL CITRATE 25 MCG: 50 INJECTION INTRAMUSCULAR; INTRAVENOUS at 16:09

## 2022-04-06 RX ADMIN — SODIUM CHLORIDE: 9 INJECTION, SOLUTION INTRAVENOUS at 13:30

## 2022-04-06 RX ADMIN — PHENYLEPHRINE HYDROCHLORIDE 50 MCG: 10 INJECTION INTRAVENOUS at 13:45

## 2022-04-06 RX ADMIN — SODIUM CHLORIDE, POTASSIUM CHLORIDE, SODIUM LACTATE AND CALCIUM CHLORIDE: 600; 310; 30; 20 INJECTION, SOLUTION INTRAVENOUS at 11:19

## 2022-04-06 RX ADMIN — Medication 5 MG: at 14:49

## 2022-04-06 RX ADMIN — PHENYLEPHRINE HYDROCHLORIDE 100 MCG: 10 INJECTION INTRAVENOUS at 14:30

## 2022-04-06 RX ADMIN — ROCURONIUM BROMIDE 30 MG: 50 INJECTION, SOLUTION INTRAVENOUS at 14:18

## 2022-04-06 RX ADMIN — PROPOFOL 50 MCG/KG/MIN: 10 INJECTION, EMULSION INTRAVENOUS at 13:30

## 2022-04-06 RX ADMIN — SODIUM CHLORIDE, POTASSIUM CHLORIDE, SODIUM LACTATE AND CALCIUM CHLORIDE: 600; 310; 30; 20 INJECTION, SOLUTION INTRAVENOUS at 14:00

## 2022-04-06 RX ADMIN — FENTANYL CITRATE 25 MCG: 50 INJECTION INTRAMUSCULAR; INTRAVENOUS at 16:25

## 2022-04-06 RX ADMIN — MIDAZOLAM 2 MG: 1 INJECTION INTRAMUSCULAR; INTRAVENOUS at 13:15

## 2022-04-06 RX ADMIN — GLYCOPYRROLATE 0.2 MG: 0.2 INJECTION, SOLUTION INTRAMUSCULAR; INTRAVENOUS at 13:50

## 2022-04-06 RX ADMIN — PHENYLEPHRINE HYDROCHLORIDE 100 MCG: 10 INJECTION INTRAVENOUS at 13:30

## 2022-04-06 RX ADMIN — ALBUMIN HUMAN: 0.05 INJECTION, SOLUTION INTRAVENOUS at 14:10

## 2022-04-06 RX ADMIN — OXYCODONE HYDROCHLORIDE 5 MG: 5 TABLET ORAL at 22:50

## 2022-04-06 RX ADMIN — PHENYLEPHRINE HYDROCHLORIDE 100 MCG: 10 INJECTION INTRAVENOUS at 13:52

## 2022-04-06 RX ADMIN — FENTANYL CITRATE 25 MCG: 50 INJECTION INTRAMUSCULAR; INTRAVENOUS at 16:40

## 2022-04-06 RX ADMIN — DEXAMETHASONE SODIUM PHOSPHATE 10 MG: 10 INJECTION, SOLUTION INTRAMUSCULAR; INTRAVENOUS at 13:30

## 2022-04-06 RX ADMIN — HYDROMORPHONE HYDROCHLORIDE 0.5 MG: 1 INJECTION, SOLUTION INTRAMUSCULAR; INTRAVENOUS; SUBCUTANEOUS at 15:00

## 2022-04-06 RX ADMIN — DOCUSATE SODIUM 100 MG: 100 CAPSULE, LIQUID FILLED ORAL at 22:50

## 2022-04-06 RX ADMIN — PHENYLEPHRINE HYDROCHLORIDE 100 MCG: 10 INJECTION INTRAVENOUS at 13:48

## 2022-04-06 RX ADMIN — FENTANYL CITRATE 25 MCG: 50 INJECTION INTRAMUSCULAR; INTRAVENOUS at 15:53

## 2022-04-06 RX ADMIN — PHENYLEPHRINE HYDROCHLORIDE 50 MCG: 10 INJECTION INTRAVENOUS at 13:42

## 2022-04-06 RX ADMIN — FENTANYL CITRATE 25 MCG: 50 INJECTION INTRAMUSCULAR; INTRAVENOUS at 16:35

## 2022-04-06 RX ADMIN — PROPOFOL 200 MG: 10 INJECTION, EMULSION INTRAVENOUS at 13:25

## 2022-04-06 RX ADMIN — HYDROMORPHONE HYDROCHLORIDE 0.5 MG: 1 INJECTION, SOLUTION INTRAMUSCULAR; INTRAVENOUS; SUBCUTANEOUS at 15:20

## 2022-04-06 RX ADMIN — FENTANYL CITRATE 25 MCG: 50 INJECTION INTRAMUSCULAR; INTRAVENOUS at 16:04

## 2022-04-06 RX ADMIN — SUGAMMADEX 200 MG: 100 INJECTION, SOLUTION INTRAVENOUS at 15:25

## 2022-04-06 RX ADMIN — FENTANYL CITRATE 100 MCG: 50 INJECTION, SOLUTION INTRAMUSCULAR; INTRAVENOUS at 13:25

## 2022-04-06 RX ADMIN — PHENYLEPHRINE HYDROCHLORIDE 0.2 MCG/KG/MIN: 10 INJECTION INTRAVENOUS at 14:00

## 2022-04-06 RX ADMIN — Medication 10 MG: at 13:55

## 2022-04-06 RX ADMIN — PHENYLEPHRINE HYDROCHLORIDE 100 MCG: 10 INJECTION INTRAVENOUS at 13:58

## 2022-04-06 RX ADMIN — GLYCOPYRROLATE 0.2 MG: 0.2 INJECTION, SOLUTION INTRAMUSCULAR; INTRAVENOUS at 13:42

## 2022-04-06 RX ADMIN — PHENYLEPHRINE HYDROCHLORIDE 50 MCG: 10 INJECTION INTRAVENOUS at 13:39

## 2022-04-06 RX ADMIN — ROCURONIUM BROMIDE 50 MG: 50 INJECTION, SOLUTION INTRAVENOUS at 13:25

## 2022-04-06 RX ADMIN — SODIUM CHLORIDE, POTASSIUM CHLORIDE, SODIUM LACTATE AND CALCIUM CHLORIDE: 600; 310; 30; 20 INJECTION, SOLUTION INTRAVENOUS at 21:46

## 2022-04-06 RX ADMIN — ONDANSETRON 4 MG: 2 INJECTION INTRAMUSCULAR; INTRAVENOUS at 15:00

## 2022-04-06 RX ADMIN — OXYCODONE HYDROCHLORIDE 5 MG: 5 TABLET ORAL at 18:39

## 2022-04-06 RX ADMIN — FENTANYL CITRATE 25 MCG: 50 INJECTION INTRAMUSCULAR; INTRAVENOUS at 15:58

## 2022-04-06 RX ADMIN — MAGNESIUM SULFATE HEPTAHYDRATE 4 G: 80 INJECTION, SOLUTION INTRAVENOUS at 12:17

## 2022-04-06 RX ADMIN — LIDOCAINE HYDROCHLORIDE 60 MG: 20 INJECTION, SOLUTION INFILTRATION; PERINEURAL at 13:25

## 2022-04-06 RX ADMIN — Medication 2 G: at 13:30

## 2022-04-06 RX ADMIN — PHENYLEPHRINE HYDROCHLORIDE 50 MCG: 10 INJECTION INTRAVENOUS at 13:34

## 2022-04-06 RX ADMIN — FENTANYL CITRATE 25 MCG: 50 INJECTION INTRAMUSCULAR; INTRAVENOUS at 16:30

## 2022-04-06 NOTE — ANESTHESIA PREPROCEDURE EVALUATION
"Anesthesia Pre-Procedure Evaluation    Patient: Kai Jim   MRN: [id number] : 1960        Procedure : Procedure(s):  NEPHRECTOMY, RADICAL, ROBOT-ASSISTED LAPAROSCOPIC          Past Medical History:   Diagnosis Date     BPH (benign prostatic hyperplasia)      ED (erectile dysfunction)      Family history of kidney stones      Gastroesophageal reflux disease      Hyperlipidemia      Hypertension      Kidney mass      Knee pain     right- \"bone on bone\"     Psoriatic arthritis (H) 2016     Seasonal allergies      Sinusitis       Past Surgical History:   Procedure Laterality Date     finger amputation surgery Right     Due to log splitter. x2 fingers     HERNIA REPAIR  1991    Right inguinal     [place] LAP,INGUINAL HERNIA REPR,INITIAL Left 2020    Procedure: HERNIORRHAPHY, INGUINAL, LAPAROSCOPIC;  Surgeon: Jareth Julian MD;  Location: [facility];  Service: General      No Known Allergies   Social History     Tobacco Use     Smoking status: Former Smoker     Types: Cigars     Quit date: 3/12/2019     Years since quitting: 3.0     Smokeless tobacco: Never Used   Substance Use Topics     Alcohol use: Yes     Comment: occasional      Wt Readings from Last 1 Encounters:   22 99.7 kg (219 lb 12.8 oz)        Anesthesia Evaluation   Pt has had prior anesthetic.     No history of anesthetic complications       ROS/MED HX  ENT/Pulmonary:  - neg pulmonary ROS     Neurologic:  - neg neurologic ROS     Cardiovascular:  - neg cardiovascular ROS  (-) hypertension   METS/Exercise Tolerance:     Hematologic:  - neg hematologic  ROS     Musculoskeletal:  - neg musculoskeletal ROS     GI/Hepatic:     (+) GERD,     Renal/Genitourinary:  - neg Renal ROS     Endo:  - neg endo ROS     Psychiatric/Substance Use:  - neg psychiatric ROS     Infectious Disease:  - neg infectious disease ROS     Malignancy:  - neg malignancy ROS     Other:  - neg other ROS          Physical Exam    Airway  airway exam " normal      Mallampati: II   TM distance: > 3 FB   Neck ROM: full   Mouth opening: > 3 cm    Respiratory Devices and Support         Dental  no notable dental history         Cardiovascular   cardiovascular exam normal       Rhythm and rate: regular and normal     Pulmonary   pulmonary exam normal        breath sounds clear to auscultation           OUTSIDE LABS:  CBC:   Lab Results   Component Value Date    WBC 7.1 01/07/2022    WBC 7.1 09/14/2021    HGB 14.6 04/06/2022    HGB 15.3 01/07/2022    HCT 46.5 01/07/2022    HCT 44.8 09/14/2021     01/07/2022     09/14/2021     BMP:   Lab Results   Component Value Date     04/01/2022     02/03/2022    POTASSIUM 4.1 04/01/2022    POTASSIUM 4.0 02/03/2022    CHLORIDE 106 04/01/2022    CHLORIDE 108 (H) 02/03/2022    CO2 25 04/01/2022    CO2 21 (L) 02/03/2022    BUN 15 04/01/2022    BUN 13 02/03/2022    CR 0.95 04/01/2022    CR 0.81 02/03/2022    GLC 89 04/01/2022    GLC 89 02/03/2022     COAGS: No results found for: PTT, INR, FIBR  POC: No results found for: BGM, HCG, HCGS  HEPATIC:   Lab Results   Component Value Date    ALBUMIN 3.9 01/07/2022    ALT 19 01/07/2022     OTHER:   Lab Results   Component Value Date    A1C 5.4 12/13/2018    AMARI 9.2 04/01/2022    CRP 0.7 04/17/2018    SED 15 04/17/2018       Anesthesia Plan    ASA Status:  2   NPO Status:  NPO Appropriate    Anesthesia Type: General.     - Airway: ETT   Induction: Propofol.   Maintenance: Balanced.   Techniques and Equipment:     - Lines/Monitors: 2nd IV     Consents    Anesthesia Plan(s) and associated risks, benefits, and realistic alternatives discussed. Questions answered and patient/representative(s) expressed understanding.    - Discussed:     - Discussed with:  Patient      - Extended Intubation/Ventilatory Support Discussed: No.      - Patient is DNR/DNI Status: No    Use of blood products discussed: No .     Postoperative Care    Pain management: IV analgesics, Oral pain  medications.   PONV prophylaxis: Ondansetron (or other 5HT-3), Dexamethasone or Solumedrol, Background Propofol Infusion     Comments:                Nabil Ojeda MD

## 2022-04-06 NOTE — OP NOTE
Date of Surgery:  04/06/22    Location of Service: [facility]    Preoperative diagnosis:  1. Right renal mass      Postoperative diagnosis:  1. Right renal mass    Procedure:  1. Robotic-assisted laparoscopic right radical nephrectomy     Surgeon: Brad Luciano MD    Assistant: Darin Shields MD.  I asked Dr. Shields to assist given his expertise with minimally invasive kidney surgery.     Anesthesia: General    Estimated blood loss: 100 mL    Specimens:   1. Right kidney    Drains:  1. 16 Slovak urethral Rust    Operative findings:  Large central right renal mass. No obvious extra-renal disease.    Operative indication:  The patient is a 61 year old male  who presented to me for evaluation of a right renal mass. They were counseled on treatment options and have elected to proceed with robotic-assisted laparoscopic right radical nephrectomy.      Operative procedure:  After appropriate informed consent was obtained the patient was brought to the Operating Theater. General anesthesia was induced and perioperative intravenous antibiotics were administered. A Rust catheter was inserted and placed to gravity drainage. The patient was then placed in a flank position and all pressure points were padded and the patient was secured to the table. The patient was then prepared in the usual sterile fashion.     Pneumoperitoneum was established using a Veress needle. A 8 mm camera port is placed under direct vision in the right upper quadrant. The intra-abdominal contents are inspected. I could appreciate the mass effect from the large right renal mass. Three additional 8 mm ports were then placed under direct vision without difficulty. An additional 12 mm assistant port was placed just above the umbilicus in the midline as well as an additional 5 mm assistant port in the upper midline.  An additional 5 mm port was placed just below the xiphoid. The da Cyndy robotic instruments were brought into the  field and the robot was docked. The right colon was then mobilized medially by incising the peritoneal reflection along the avascular plane as described by Toldt. The duodenum was medialized using sharp dissection in the fashion as described by Kocher.     The ureter and gonadal vein were then identified and the ureter was elevated from the psoas muscle. The gonadal vein was then  from the ureter and dissected to its insertion into the inferior vena cava. I then identified the hilum. There were a total of 2 renal veins. I then identified the renal artery. This was dissected free and then ligated with a vascular staple load.  The renal veins were similarly ligated and divided.       I then turned my attention to the upper pole attachments which were freed with a combination of cautery, clips, and staple loads. The adrenal gland was preserved. The lateral attachments of the kidney were taken using cautery and the ureter was stapled and divided. The kidney was now completely free and was placed within an Endo Catch bag.     The hilum and upper pole attachments were again inspected. Hemostasis was excellent. The 12 mm assistant port was then extended and the kidney was extracted through this incision. All remaining ports were then removed. The fascia was closed with interrupted 0 Vicryl suture. The skin incisions were then closed with a 4-0 Vicryl suture in a subcuticular fashion.     The patient was then awoken from general anesthesia and brought to the recovery room in satisfactory condition.     Brad Luciano MD

## 2022-04-06 NOTE — ANESTHESIA PROCEDURE NOTES
Airway       Patient location during procedure: OR       Procedure Start/Stop Times: 4/6/2022 1:27 PM  Staff -        CRNA: Mane Stephenson APRN CRNA       Performed By: CRNAIndications and Patient Condition       Indications for airway management: heidi-procedural and airway protection       Induction type:intravenous       Mask difficulty assessment: 2 - vent by mask + OA or adjuvant +/- NMBA    Final Airway Details       Final airway type: endotracheal airway       Successful airway: ETT - single  Endotracheal Airway Details        ETT size (mm): 7.5       Cuffed: yes       Successful intubation technique: direct laryngoscopy       DL Blade Type: Taveras 2       Grade View of Cords: 1       Adjucts: stylet and tooth guard       Position: Right       Measured from: lips       Secured at (cm): 23    Post intubation assessment        Placement verified by: capnometry, equal breath sounds and chest rise        Number of attempts at approach: 1       Secured with: silk tape       Ease of procedure: easy       Dentition: Intact and Unchanged

## 2022-04-06 NOTE — PROGRESS NOTES
Pt transferred to unit P2 in stable condition. Report given to P2 RN. Family notified. All belongings transferred with.

## 2022-04-06 NOTE — ANESTHESIA CARE TRANSFER NOTE
Patient: Kai Jim    Procedure: Procedure(s):  NEPHRECTOMY, RADICAL, ROBOT-ASSISTED LAPAROSCOPIC       Diagnosis: Renal mass [N28.89]  Diagnosis Additional Information: No value filed.    Anesthesia Type:   General     Note:    Oropharynx: oropharynx clear of all foreign objects  Level of Consciousness: awake  Oxygen Supplementation: face mask  Level of Supplemental Oxygen (L/min / FiO2): 6  Independent Airway: airway patency satisfactory and stable  Dentition: dentition unchanged  Vital Signs Stable: post-procedure vital signs reviewed and stable  Report to RN Given: handoff report given  Patient transferred to: PACU    Handoff Report: Identifed the Patient, Identified the Reponsible Provider, Reviewed the pertinent medical history, Discussed the surgical course, Reviewed Intra-OP anesthesia mangement and issues during anesthesia, Set expectations for post-procedure period and Allowed opportunity for questions and acknowledgement of understanding      Vitals:  Vitals Value Taken Time   /81 04/06/22 1534   Temp 36.7  C (98.1  F) 04/06/22 1534   Pulse 73 04/06/22 1536   Resp 11 04/06/22 1536   SpO2 99 % 04/06/22 1536   Vitals shown include unvalidated device data.    Electronically Signed By: JAYCOB Calvillo CRNA  April 6, 2022  3:38 PM

## 2022-04-06 NOTE — ANESTHESIA POSTPROCEDURE EVALUATION
Patient: Kai Jim    Procedure: Procedure(s):  NEPHRECTOMY, RADICAL, ROBOT-ASSISTED LAPAROSCOPIC       Anesthesia Type:  General    Note:     Postop Pain Control: Uneventful            Sign Out: Well controlled pain   PONV: No   Neuro/Psych: Uneventful            Sign Out: Acceptable/Baseline neuro status   Airway/Respiratory: Uneventful            Sign Out: Acceptable/Baseline resp. status   CV/Hemodynamics: Uneventful            Sign Out: Acceptable CV status; No obvious hypovolemia; No obvious fluid overload   Other NRE: NONE   DID A NON-ROUTINE EVENT OCCUR? No           Last vitals:  Vitals Value Taken Time   /83 04/06/22 1630   Temp 36.7  C (98.1  F) 04/06/22 1534   Pulse 79 04/06/22 1644   Resp 24 04/06/22 1644   SpO2 98 % 04/06/22 1644   Vitals shown include unvalidated device data.    Electronically Signed By: Halima Baird MD  April 6, 2022  4:46 PM

## 2022-04-06 NOTE — PHARMACY-ADMISSION MEDICATION HISTORY
Pharmacy Note - Admission Medication History     ______________________________________________________________________    Prior To Admission (PTA) med list completed and updated in EMR.       PTA Med List   Medication Sig Last Dose     fish oil-omega-3 fatty acids 1000 MG capsule Take 1 g by mouth daily Past Week     Multiple Vitamin (MULTI VITAMIN) TABS Take 1 tablet by mouth daily  Past Week     Probiotic Product (FLORAJEN BIFIDOBLEND PO) Take 1 capsule by mouth daily as needed Past Week     sildenafil (VIAGRA) 50 MG tablet Take  mg by mouth daily as needed Stop date 9/7/2022  Unknown     Specialty Vitamins Products (PROSTATE PO) Take 1 capsule by mouth daily Past Week     Vitamin D3 (CHOLECALCIFEROL) 125 MCG (5000 UT) tablet Take 1 tablet by mouth daily Past Week       [facility]  Method of interview communication: in-person    Patient was asked about OTC/herbal products specifically.  PTA med list reflects this.    In the past week, patient estimated taking medication this percent of the time:  greater than 90%.    Allergies were reviewed, assessed, and updated with the patient.      Patient does not use any multi-dose medications prior to admission.    The information provided in this note is only as accurate as the sources available at the time of the update(s).    Thank you for the opportunity to participate in the care of this patient.    Elissa Mock RPH  4/6/2022 11:25 AM

## 2022-04-06 NOTE — INTERVAL H&P NOTE
"I have reviewed the surgical (or preoperative) H&P that is linked to this encounter, and examined the patient. There are no significant changes    Clinical Conditions Present on Arrival:  Clinically Significant Risk Factors Present on Admission                    # Obesity: Estimated body mass index is 30.66 kg/m  as calculated from the following:    Height as of this encounter: 1.803 m (5' 11\").    Weight as of this encounter: 99.7 kg (219 lb 12.8 oz).       "

## 2022-04-07 LAB
ANION GAP SERPL CALCULATED.3IONS-SCNC: 10 MMOL/L (ref 5–18)
BUN SERPL-MCNC: 14 MG/DL (ref 8–22)
CALCIUM SERPL-MCNC: 8.5 MG/DL (ref 8.5–10.5)
CHLORIDE BLD-SCNC: 105 MMOL/L (ref 98–107)
CO2 SERPL-SCNC: 23 MMOL/L (ref 22–31)
CREAT SERPL-MCNC: 1.39 MG/DL (ref 0.7–1.3)
GFR SERPL CREATININE-BSD FRML MDRD: 58 ML/MIN/1.73M2
GLUCOSE BLD-MCNC: 98 MG/DL (ref 70–125)
GLUCOSE BLDC GLUCOMTR-MCNC: 82 MG/DL (ref 70–99)
HGB BLD-MCNC: 13.9 G/DL (ref 13.3–17.7)
PATH REPORT.COMMENTS IMP SPEC: ABNORMAL
PATH REPORT.COMMENTS IMP SPEC: ABNORMAL
PATH REPORT.COMMENTS IMP SPEC: YES
PATH REPORT.FINAL DX SPEC: ABNORMAL
PATH REPORT.GROSS SPEC: ABNORMAL
PATH REPORT.MICROSCOPIC SPEC OTHER STN: ABNORMAL
PATH REPORT.RELEVANT HX SPEC: ABNORMAL
PATHOLOGY SYNOPTIC REPORT: ABNORMAL
PHOTO IMAGE: ABNORMAL
POTASSIUM BLD-SCNC: 4.4 MMOL/L (ref 3.5–5)
SODIUM SERPL-SCNC: 138 MMOL/L (ref 136–145)

## 2022-04-07 PROCEDURE — 250N000011 HC RX IP 250 OP 636: Performed by: UROLOGY

## 2022-04-07 PROCEDURE — 36415 COLL VENOUS BLD VENIPUNCTURE: CPT | Performed by: UROLOGY

## 2022-04-07 PROCEDURE — 250N000013 HC RX MED GY IP 250 OP 250 PS 637: Performed by: HOSPITALIST

## 2022-04-07 PROCEDURE — 85018 HEMOGLOBIN: CPT | Performed by: UROLOGY

## 2022-04-07 PROCEDURE — 258N000003 HC RX IP 258 OP 636: Performed by: UROLOGY

## 2022-04-07 PROCEDURE — 120N000001 HC R&B MED SURG/OB

## 2022-04-07 PROCEDURE — 96372 THER/PROPH/DIAG INJ SC/IM: CPT | Performed by: UROLOGY

## 2022-04-07 PROCEDURE — 80048 BASIC METABOLIC PNL TOTAL CA: CPT | Performed by: UROLOGY

## 2022-04-07 PROCEDURE — 82962 GLUCOSE BLOOD TEST: CPT

## 2022-04-07 PROCEDURE — 250N000013 HC RX MED GY IP 250 OP 250 PS 637: Performed by: UROLOGY

## 2022-04-07 RX ORDER — LANOLIN ALCOHOL/MO/W.PET/CERES
3 CREAM (GRAM) TOPICAL ONCE
Status: COMPLETED | OUTPATIENT
Start: 2022-04-07 | End: 2022-04-07

## 2022-04-07 RX ADMIN — OXYCODONE HYDROCHLORIDE 5 MG: 5 TABLET ORAL at 22:11

## 2022-04-07 RX ADMIN — MORPHINE SULFATE 2 MG: 2 INJECTION, SOLUTION INTRAMUSCULAR; INTRAVENOUS at 13:02

## 2022-04-07 RX ADMIN — MELATONIN TAB 3 MG 3 MG: 3 TAB at 22:11

## 2022-04-07 RX ADMIN — HEPARIN SODIUM 5000 UNITS: 10000 INJECTION, SOLUTION INTRAVENOUS; SUBCUTANEOUS at 10:43

## 2022-04-07 RX ADMIN — OXYCODONE HYDROCHLORIDE 5 MG: 5 TABLET ORAL at 07:45

## 2022-04-07 RX ADMIN — ACETAMINOPHEN 325 MG: 325 TABLET ORAL at 10:43

## 2022-04-07 RX ADMIN — HEPARIN SODIUM 5000 UNITS: 10000 INJECTION, SOLUTION INTRAVENOUS; SUBCUTANEOUS at 17:33

## 2022-04-07 RX ADMIN — OXYCODONE HYDROCHLORIDE 5 MG: 5 TABLET ORAL at 17:32

## 2022-04-07 RX ADMIN — OXYCODONE HYDROCHLORIDE 5 MG: 5 TABLET ORAL at 03:20

## 2022-04-07 RX ADMIN — DOCUSATE SODIUM 100 MG: 100 CAPSULE, LIQUID FILLED ORAL at 10:42

## 2022-04-07 RX ADMIN — OXYCODONE HYDROCHLORIDE 5 MG: 5 TABLET ORAL at 12:06

## 2022-04-07 RX ADMIN — DOCUSATE SODIUM 100 MG: 100 CAPSULE, LIQUID FILLED ORAL at 20:47

## 2022-04-07 RX ADMIN — MORPHINE SULFATE 2 MG: 2 INJECTION, SOLUTION INTRAMUSCULAR; INTRAVENOUS at 20:45

## 2022-04-07 RX ADMIN — SODIUM CHLORIDE, POTASSIUM CHLORIDE, SODIUM LACTATE AND CALCIUM CHLORIDE 1 ML: 600; 310; 30; 20 INJECTION, SOLUTION INTRAVENOUS at 21:09

## 2022-04-07 ASSESSMENT — ACTIVITIES OF DAILY LIVING (ADL)
ADLS_ACUITY_SCORE: 3

## 2022-04-07 NOTE — UTILIZATION REVIEW
Admission Status; Secondary Review Determination   Under the authority of the Utilization Management Committee, the utilization review process indicated a secondary review on Kai Jim. The review outcome is based on review of the medical records, discussions with staff, and applying clinical experience noted on the date of the review.   (x) Inpatient Status Appropriate - This patient's medical care is consistent with medical management for inpatient care and reasonable inpatient medical practice.     RATIONALE FOR DETERMINATION   61 yr old male presented for right radical nephrectomy for renal mass.  POD#1 and having issues with bloating and ileus.  No vomiting but nausea.  No/poor oral intake at this point.  Pain increased and needing IV pain meds.  At this time still requiring IVF and hydration status crucial given bump in Cr related to procedure and need to avoid worsening the renal insult. Not stable for discharge on POD#1 for procedure.    At the time of admission with the information available to the attending physician more than 2 nights Hospital complex care was anticipated, based on patient risk of adverse outcome if treated as outpatient and complex care required. Inpatient admission is appropriate based on the Medicare guidelines.   The information on this document is developed by the utilization review team in order for the business office to ensure compliance. This only denotes the appropriateness of proper admission status and does not reflect the quality of care rendered.   The definitions of Inpatient Status and Observation Status used in making the determination above are those provided in the CMS Coverage Manual, Chapter 1 and Chapter 6, section 70.4.   Sincerely,   Susi Bermudez MD  Utilization Review  Physician Advisor  [facility]

## 2022-04-07 NOTE — PROGRESS NOTES
"SPIRITUAL HEALTH SERVICES NOTE  [facility], P2    SPIRITUAL ASSESSMENT    Experiencing some discomfort today    Not used to sitting still    Identifies good support from extended family    Welcomes prayer    CARE PROVIDED  Empathic listening and presence  Normalized/validated his/her feelings of concern and hopefulness  Explored/identified sources of strength and support  Encouraged patience with himself as he recovers  Prayer shared    SPIRITUAL CARE NOTE  Saw Nir and his wife Abril due to admission screening response. He was experiencing a bit of discomfort, which he believes is due to his incisional pain and bas/air in his stomach from the procedure yesterday. Because of this, he does not feel ready to discharge today. Nir shares that his brother had a similar issue with his kidney several years ago. 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 He notes that he likes to stay busy. Abril is able to take some time off, but started a new job recently and may need to ask family for assistance. She says that they have a lot of supportive family, including Nir's siblings and that she has no problem asking for help.     Nir is [place] and is not connected to a kalli community at this time. He welcomes my offer of prayer.     Visit Length: 20 minutes    Plan of Care: Will remain available for further support as patient/family needs/desires.    Jolie Taveras M.Div.      Office: [phone] (for non-urgent requests)  Please Vocera or page through [facility] for time-sensitive requests    "

## 2022-04-07 NOTE — PROGRESS NOTES
Place of Service:  [facility]     Reason for follow up:  Right renal mass  POD #1: Robotic-assisted  laparoscopic right radical nephrectomy       SUBJECTIVE:  Events:     Patient reports doing well, less pain than anticipated. He is having some nausea, no emesis. He has had sips of water only. He got up to ambulate last night and is about to now.    OBJECTIVE:  PHYSICAL EXAM:  Temp: 98.2  F (36.8  C) Temp src: Oral BP: (!) 147/80 Pulse: 59   Resp: 16 SpO2: 96 % O2 Device: None (Room air) Oxygen Delivery: 2 LPM  General: NAD, alert, cooperative  Head: normocephalic, without abnormality / atraumatic  Abdomen: soft, minimally tender, incision site dry, well approximated   Genitourinary: hu catheter in place draining translucent yellow urine  Skin: No rashes or lesions  Musculoskeletal: moves all four extremities equally; no calf edema or tenderness  Psychological: alert and oriented, answers questions appropriately    LABS:  Creatinine   Date Value Ref Range Status   04/07/2022 1.39 (H) 0.70 - 1.30 mg/dL Final     WBC Count   Date Value Ref Range Status   01/07/2022 7.1 4.0 - 11.0 10e3/uL Final     Hemoglobin   Date Value Ref Range Status   04/07/2022 13.9 13.3 - 17.7 g/dL Final     Platelet Count   Date Value Ref Range Status   04/06/2022 234 150 - 450 10e3/uL Final         ASSESSMENT/PLAN:  1Diaz Jim is being seen by [facility] for  Right renal mass  POD #1: Robotic-assisted  laparoscopic right radical nephrectomy     -Creatinine elevated 1.39 which is expected   -Hg stable 13.9  - Patient belching and some abdominal discomfort, will continue to monitor for post operative ileus  - Hu catheter removal order placed, Check PVRs. If < 200 ml x 2, discontinue PVR checks. If > 400 ml, straight cath. If requires > 2 straight catheterizations, place hu catheter.    -Will likely require additional night in hospital to monitor for post operative illeus if continued nausea.       This case was  discussed with: Dr. Mustapha Ball PA-C  [facility]   [phone]       4/7/22 12 pm  Patient without nausea, belching and upper abdominal pain currently, tolerable at this time  Continue clear liquid diet and monitor  Will stay tonight

## 2022-04-07 NOTE — PLAN OF CARE
Problem: Plan of Care - These are the overarching goals to be used throughout the patient stay.    Goal: Absence of Hospital-Acquired Illness or Injury  Outcome: Ongoing, Progressing  Intervention: Identify and Manage Fall Risk  Recent Flowsheet Documentation  Taken 4/7/2022 0320 by Nilay Dominique RN  Safety Promotion/Fall Prevention:   activity supervised   nonskid shoes/slippers when out of bed   patient and family education  Taken 4/6/2022 2300 by Nilay Dominique RN  Safety Promotion/Fall Prevention:   activity supervised   nonskid shoes/slippers when out of bed   patient and family education  Taken 4/6/2022 1945 by Nilay Dominique RN  Safety Promotion/Fall Prevention:   activity supervised   nonskid shoes/slippers when out of bed   patient and family education  Intervention: Prevent Skin Injury  Recent Flowsheet Documentation  Taken 4/7/2022 0320 by Nilay Dominique RN  Body Position: position changed independently  Intervention: Prevent and Manage VTE (Venous Thromboembolism) Risk  Recent Flowsheet Documentation  Taken 4/6/2022 2300 by Nilay Dominique RN  Activity Management:   activity adjusted per tolerance   bedrest  Taken 4/6/2022 2005 by Nilay Dominique RN  Activity Management: ambulated in [place]  Goal: Optimal Comfort and Wellbeing  Outcome: Ongoing, Progressing  Intervention: Monitor Pain and Promote Comfort  Recent Flowsheet Documentation  Taken 4/7/2022 0415 by Nilay Dominique RN  Pain Management Interventions: (dangled at bedside)   cold applied   ambulation/increased activity   rest   repositioned  Taken 4/7/2022 0320 by Nilay Dominique RN  Pain Management Interventions:   medication (see MAR)   cold applied  Taken 4/6/2022 2250 by Nilay Dominique RN  Pain Management Interventions:   medication (see MAR)   rest   repositioned   quiet environment facilitated  Taken 4/6/2022 1934 by Nilay Dominique RN  Pain Management Interventions: ambulation/increased activity  Goal: Readiness for Transition of Care  Outcome:  Ongoing, Progressing   Goal Outcome Evaluation:      Patient pain controlled and tolerable with PO oxycodone PRN 5mg + cold therapy. Gas pain converting to incisional pain this morning. Up to ambulate 150 feet with SBA, gait belt, and IV pole. Stable gait. VSS on room air. Patient slept some in between cares. Rust catheter adequate clear yellow output. Bowel sounds active on L, hypoactive on R. Has not passed gas yet.   Nilay Dominique RN

## 2022-04-08 VITALS
DIASTOLIC BLOOD PRESSURE: 83 MMHG | OXYGEN SATURATION: 94 % | SYSTOLIC BLOOD PRESSURE: 143 MMHG | RESPIRATION RATE: 18 BRPM | HEART RATE: 68 BPM | WEIGHT: 219.8 LBS | HEIGHT: 71 IN | TEMPERATURE: 98.3 F | BODY MASS INDEX: 30.77 KG/M2

## 2022-04-08 LAB
CREAT SERPL-MCNC: 1.45 MG/DL (ref 0.7–1.3)
GFR SERPL CREATININE-BSD FRML MDRD: 55 ML/MIN/1.73M2
GLUCOSE BLDC GLUCOMTR-MCNC: 90 MG/DL (ref 70–99)
HGB BLD-MCNC: 13.3 G/DL (ref 13.3–17.7)

## 2022-04-08 PROCEDURE — 82565 ASSAY OF CREATININE: CPT | Performed by: EMERGENCY MEDICINE

## 2022-04-08 PROCEDURE — 250N000013 HC RX MED GY IP 250 OP 250 PS 637: Performed by: EMERGENCY MEDICINE

## 2022-04-08 PROCEDURE — 258N000003 HC RX IP 258 OP 636: Performed by: UROLOGY

## 2022-04-08 PROCEDURE — 250N000013 HC RX MED GY IP 250 OP 250 PS 637: Performed by: UROLOGY

## 2022-04-08 PROCEDURE — 250N000011 HC RX IP 250 OP 636: Performed by: UROLOGY

## 2022-04-08 PROCEDURE — 36415 COLL VENOUS BLD VENIPUNCTURE: CPT | Performed by: EMERGENCY MEDICINE

## 2022-04-08 PROCEDURE — 85018 HEMOGLOBIN: CPT | Performed by: EMERGENCY MEDICINE

## 2022-04-08 RX ORDER — OXYCODONE HYDROCHLORIDE 5 MG/1
5-10 TABLET ORAL EVERY 6 HOURS PRN
Qty: 12 TABLET | Refills: 0 | Status: SHIPPED | OUTPATIENT
Start: 2022-04-08 | End: 2022-04-11

## 2022-04-08 RX ORDER — POLYETHYLENE GLYCOL 3350 17 G/17G
1 POWDER, FOR SOLUTION ORAL DAILY
Qty: 238 G | Refills: 0 | Status: SHIPPED | OUTPATIENT
Start: 2022-04-08 | End: 2022-04-22

## 2022-04-08 RX ORDER — OXYCODONE HYDROCHLORIDE 5 MG/1
5-10 TABLET ORAL EVERY 4 HOURS PRN
Status: DISCONTINUED | OUTPATIENT
Start: 2022-04-08 | End: 2022-04-08 | Stop reason: HOSPADM

## 2022-04-08 RX ADMIN — OXYCODONE HYDROCHLORIDE 5 MG: 5 TABLET ORAL at 06:54

## 2022-04-08 RX ADMIN — OXYCODONE HYDROCHLORIDE 5 MG: 5 TABLET ORAL at 02:48

## 2022-04-08 RX ADMIN — OXYCODONE HYDROCHLORIDE 5 MG: 5 TABLET ORAL at 18:21

## 2022-04-08 RX ADMIN — OXYCODONE HYDROCHLORIDE 5 MG: 5 TABLET ORAL at 12:16

## 2022-04-08 RX ADMIN — HEPARIN SODIUM 5000 UNITS: 10000 INJECTION, SOLUTION INTRAVENOUS; SUBCUTANEOUS at 09:01

## 2022-04-08 RX ADMIN — SODIUM CHLORIDE, POTASSIUM CHLORIDE, SODIUM LACTATE AND CALCIUM CHLORIDE: 600; 310; 30; 20 INJECTION, SOLUTION INTRAVENOUS at 10:11

## 2022-04-08 RX ADMIN — HEPARIN SODIUM 5000 UNITS: 10000 INJECTION, SOLUTION INTRAVENOUS; SUBCUTANEOUS at 02:45

## 2022-04-08 RX ADMIN — DOCUSATE SODIUM 100 MG: 100 CAPSULE, LIQUID FILLED ORAL at 08:53

## 2022-04-08 ASSESSMENT — ACTIVITIES OF DAILY LIVING (ADL)
ADLS_ACUITY_SCORE: 3

## 2022-04-08 NOTE — PROGRESS NOTES
Place of Service:  [facility]     Reason for follow up: Right renal mass  POD #2: Robotic-assisted  laparoscopic right radical nephrectomy     SUBJECTIVE:  Events: no acute events overnight    Patient reports doing better, pain controled. No nausea or vomiting. Has not passed gas.    OBJECTIVE:  PHYSICAL EXAM:  Temp: 98.5  F (36.9  C) Temp src: Oral BP: (!) 141/88 Pulse: 75   Resp: 18 SpO2: 94 % O2 Device: None (Room air)    General: NAD, alert, cooperative  Head: normocephalic, without abnormality / atraumatic  Abdomen: soft, minimally tender, incision site dry, well approximated   Genitourinary: deferred  Skin: No rashes or lesions  Musculoskeletal: moves all four extremities equally; no calf edema or tenderness  Psychological: alert and oriented, answers questions appropriately    LABS:  Creatinine   Date Value Ref Range Status   04/08/2022 1.45 (H) 0.70 - 1.30 mg/dL Final     Hemoglobin   Date Value Ref Range Status   04/08/2022 13.3 13.3 - 17.7 g/dL Final     Platelet Count   Date Value Ref Range Status   04/06/2022 234 150 - 450 10e3/uL Final       Lab Results: personally reviewed.     ASSESSMENT/PLAN:  Kai Jim is being seen by [facility] for Right renal mass  POD #2: Robotic-assisted  laparoscopic right radical nephrectomy     - advance diet as tolerate  -continue to encourage ambulation  -elevated creatinine expected  - no concerns for illeus at this time, abdominal pain improved, no nausea. He is not passing gas however tolerating liwuid diet. Will discuss wit Dr. Luciano, if continues to improve anticipate discharge today      This case was discussed with:  Dr Brad Ball PA-C  [facility]   [phone]    Addendum  4/8/22 13:43  Patient states some increasing abdominal pain after ambulation, taking 5 mg oxycodone with some improvement. Will increases to 5-10 mg for sever pain.  No nausea or vomiting, tolerating liquid diet.   Patinet feeling anxious about  discharge, uncertain if ready due to pain. Will continue monitor.

## 2022-04-08 NOTE — PLAN OF CARE
Problem: Plan of Care - These are the overarching goals to be used throughout the patient stay.    Goal: Readiness for Transition of Care  Outcome: Adequate for Care Transition  Discharge orders written and reviewed with patient.  Understood and signed papers.  Wife to transport home.  Has not passed gas as of yet.  NP aware.

## 2022-04-08 NOTE — DISCHARGE SUMMARY
[facility] DISCHARGE SUMMARY    Name: Kai Jim  : 1960  MRN: [id number]  PCP: Carlos Bhatt    Place of service: [facility]    Admission date: 2022   Discharge date: 2022     Surgeon: Dr Brad Lucinao Assistant: Darin Shields MD.      Surgical Procedure:Robotic-assisted laparoscopic right radical nephrectomy        Date of surgery: 2022         Principal Diagnosis at Discharge:   Renal mass [N28.89]     Other diagnosis addressed during hospitalization:  none    Consults:  none    Diagnostic Studies :  none    Complications while in the Hospital:  none    Physical Exam:  Temp: 98.3  F (36.8  C) Temp src: Oral BP: (!) 143/83 Pulse: 68   Resp: 18 SpO2: 94 % O2 Device: None (Room air)      Gen: nad, alert  Neuro: A&O x 3. Moving all extremities equally.   Resp: Reg resp rate/depth.   Abdomen: appropriately tender, non distended, no rebound or peritoneal signs  Incisions: C/D/I, closed with subcutaneous suture/skin glue, minimal ecchymosis noted    :no hu catheter   LE: CWMS intact.    Brief history of hospital course:  This is a 61 year old male admitted for Renal mass [N28.89]. Patient underwent above procedure. Patient tolerated the procedure well. Post operative course was unremarkable. By the day of discharge, the patient was ambulatory, tolerating diet, pain was controlled with oral analgesics, labs and vitals stable.     Labs:  Hemoglobin   Date Value Ref Range Status   2022 13.3 13.3 - 17.7 g/dL Final   ]  Platelet Count   Date Value Ref Range Status   2022 234 150 - 450 10e3/uL Final     WBC Count   Date Value Ref Range Status   2022 7.1 4.0 - 11.0 10e3/uL Final     Creatinine   Date Value Ref Range Status   2022 1.45 (H) 0.70 - 1.30 mg/dL Final     Potassium   Date Value Ref Range Status   2022 4.4 3.5 - 5.0 mmol/L Final     No results found for: INR       Surgical Pathology : Right Kidney with  Renal Cell Carcinoma,  clear cell type confined to kidney.        DISPOSITION: Home     DISCHARGE CONDITION: Good/Stable    DISCHARGE MEDICATIONS:      Review of your medicines      START taking      Dose / Directions   oxyCODONE 5 MG tablet  Commonly known as: ROXICODONE      Dose: 5-10 mg  Take 1-2 tablets (5-10 mg) by mouth every 6 hours as needed for pain  Quantity: 12 tablet  Refills: 0     polyethylene glycol 17 GM/Dose powder  Commonly known as: MIRALAX      Dose: 1 capful  Take 17 g (1 capful) by mouth daily for 14 days Ok to discontinue if loose stools  Quantity: 238 g  Refills: 0        CONTINUE these medicines which may have CHANGED, or have new prescriptions. If we are uncertain of the size of tablets/capsules you have at home, strength may be listed as something that might have changed.      Dose / Directions   leflunomide 20 MG tablet  Commonly known as: ARAVA  This may have changed:     how much to take    how to take this    when to take this  Used for: Psoriatic arthritis (H), Psoriasis      1 tab(s)  Quantity: 90 tablet  Refills: 0        CONTINUE these medicines which have NOT CHANGED      Dose / Directions   fish oil-omega-3 fatty acids 1000 MG capsule      Dose: 1 g  Take 1 g by mouth daily  Refills: 0     FLORAJEN BIFIDOBLEND PO      Dose: 1 capsule  Take 1 capsule by mouth daily as needed  Refills: 0     Multi Vitamin Tabs      Dose: 1 tablet  Take 1 tablet by mouth daily  Refills: 0     PROSTATE PO      Dose: 1 capsule  Take 1 capsule by mouth daily  Refills: 0     sildenafil 50 MG tablet  Commonly known as: VIAGRA      Dose:  mg  Take  mg by mouth daily as needed Stop date 9/7/2022  Refills: 0     Vitamin D3 125 MCG (5000 UT) tablet  Commonly known as: CHOLECALCIFEROL      Dose: 1 tablet  Take 1 tablet by mouth daily  Refills: 0           Where to get your medicines      These medications were [facility] to [id number] - [place], [place] - [address] AT [facility] [address]   [address] [facility] [address]    Phone: [phone]     oxyCODONE 5 MG tablet    polyethylene glycol 17 GM/Dose powder         DISCHARGE PLAN:   - Follow up with Dr Brad Luciano as scheduled prior to your procedure   - follow up with Carlos Bhatt as directed   - Take medication as prescribed, avoid anticoagulants per surgeon and PCP recommendations.   - Wound / drain care: As directed prior to discharge  - Physical activity: As tolerated, no heavy lifting. No driving while taking narcotics.   - Diet: Continue on a clear and full liquid diet until you are passing gas frequently, then it is ok to resume a regular diet. Examples of clear liquids include broth, juice, water, and jello. Full liquids include pudding, yogurt, ice cream, and creamed soups. Ask for a copy of a more comprehensive list from your nurse if needed.   - Medication: please review discharge Med req/AVS  - Warning signs discussed with patient about when to call the clinic/hospital  - All questions and concerns were answered for the patient prior to discharge  - Patient verbalized understanding.     Discussed patient with Dr Brad Luciano on day of discharge.     Britta Ball PA-C  [facility]   [phone]     I saw the patient on the date of discharge  Total time spent for discharge on date of discharge: 20 minutes    Physician(s) in addition to primary physician who should receive a copy:  CC1: Carlos Bhatt           ?

## 2022-04-08 NOTE — PLAN OF CARE
Problem: Pain Acute  Goal: Acceptable Pain Control and Functional Ability  Outcome: Ongoing, Progressing  Intervention: Develop Pain Management Plan  Intervention: Prevent or Manage Pain  Patient reports abdominal pain from surgery. Given PRN morphine and oxycodone. Incisions are clean, dry, and approximated. VSS. On IVF d/t elevated creatinine and hydration needs. Patient tolerating clears.    Patient reports belching but not passing flatus. Urinating well.    Problem: Mobility Impairment  Goal: Optimal Mobility  Outcome: Ongoing, Progressing  Intervention: Optimize Mobility    Patient ambulating independently. Patient educated on plan of care. Answered all questions and concerns. Verbalized understanding. Continuing to monitor.    Ivy Sunshine RN

## 2022-04-11 DIAGNOSIS — L40.50 PSORIATIC ARTHRITIS (H): ICD-10-CM

## 2022-04-11 DIAGNOSIS — L40.9 PSORIASIS: ICD-10-CM

## 2022-04-12 RX ORDER — LEFLUNOMIDE 20 MG/1
TABLET ORAL
Qty: 90 TABLET | Refills: 0 | Status: SHIPPED | OUTPATIENT
Start: 2022-04-12 | End: 2022-05-11

## 2022-05-11 ENCOUNTER — OFFICE VISIT (OUTPATIENT)
Dept: RHEUMATOLOGY | Facility: CLINIC | Age: 62
End: 2022-05-11

## 2022-05-11 ENCOUNTER — LAB (OUTPATIENT)
Dept: LAB | Facility: CLINIC | Age: 62
End: 2022-05-11
Payer: COMMERCIAL

## 2022-05-11 VITALS
WEIGHT: 212.9 LBS | HEIGHT: 71 IN | SYSTOLIC BLOOD PRESSURE: 134 MMHG | BODY MASS INDEX: 29.81 KG/M2 | HEART RATE: 68 BPM | DIASTOLIC BLOOD PRESSURE: 88 MMHG

## 2022-05-11 DIAGNOSIS — Z79.899 HIGH RISK MEDICATION USE: ICD-10-CM

## 2022-05-11 DIAGNOSIS — M25.561 CHRONIC PAIN OF RIGHT KNEE: ICD-10-CM

## 2022-05-11 DIAGNOSIS — L40.50 PSORIATIC ARTHRITIS (H): Primary | ICD-10-CM

## 2022-05-11 DIAGNOSIS — L40.50 PSORIATIC ARTHRITIS (H): ICD-10-CM

## 2022-05-11 DIAGNOSIS — L40.9 PSORIASIS: ICD-10-CM

## 2022-05-11 DIAGNOSIS — M15.0 PRIMARY OSTEOARTHRITIS INVOLVING MULTIPLE JOINTS: ICD-10-CM

## 2022-05-11 DIAGNOSIS — G89.29 CHRONIC PAIN OF RIGHT KNEE: ICD-10-CM

## 2022-05-11 LAB
ALBUMIN SERPL-MCNC: 3.8 G/DL (ref 3.5–5)
ALT SERPL W P-5'-P-CCNC: 22 U/L (ref 0–45)
CREAT SERPL-MCNC: 1.4 MG/DL (ref 0.7–1.3)
ERYTHROCYTE [DISTWIDTH] IN BLOOD BY AUTOMATED COUNT: 13 % (ref 10–15)
GFR SERPL CREATININE-BSD FRML MDRD: 57 ML/MIN/1.73M2
HCT VFR BLD AUTO: 42.4 % (ref 40–53)
HGB BLD-MCNC: 14 G/DL (ref 13.3–17.7)
MCH RBC QN AUTO: 28.9 PG (ref 26.5–33)
MCHC RBC AUTO-ENTMCNC: 33 G/DL (ref 31.5–36.5)
MCV RBC AUTO: 87 FL (ref 78–100)
PLATELET # BLD AUTO: 253 10E3/UL (ref 150–450)
RBC # BLD AUTO: 4.85 10E6/UL (ref 4.4–5.9)
WBC # BLD AUTO: 6 10E3/UL (ref 4–11)

## 2022-05-11 PROCEDURE — 84460 ALANINE AMINO (ALT) (SGPT): CPT

## 2022-05-11 PROCEDURE — 85027 COMPLETE CBC AUTOMATED: CPT

## 2022-05-11 PROCEDURE — 36415 COLL VENOUS BLD VENIPUNCTURE: CPT

## 2022-05-11 PROCEDURE — 99214 OFFICE O/P EST MOD 30 MIN: CPT | Mod: 25 | Performed by: INTERNAL MEDICINE

## 2022-05-11 PROCEDURE — 82565 ASSAY OF CREATININE: CPT

## 2022-05-11 PROCEDURE — 20610 DRAIN/INJ JOINT/BURSA W/O US: CPT | Mod: RT | Performed by: INTERNAL MEDICINE

## 2022-05-11 PROCEDURE — 82040 ASSAY OF SERUM ALBUMIN: CPT

## 2022-05-11 RX ORDER — LEFLUNOMIDE 20 MG/1
TABLET ORAL
Qty: 90 TABLET | Refills: 0 | Status: SHIPPED | OUTPATIENT
Start: 2022-05-11 | End: 2022-07-18

## 2022-05-11 RX ORDER — TRIAMCINOLONE ACETONIDE 40 MG/ML
40 INJECTION, SUSPENSION INTRA-ARTICULAR; INTRAMUSCULAR ONCE
Status: COMPLETED | OUTPATIENT
Start: 2022-05-11 | End: 2022-05-11

## 2022-05-11 RX ADMIN — TRIAMCINOLONE ACETONIDE 40 MG: 40 INJECTION, SUSPENSION INTRA-ARTICULAR; INTRAMUSCULAR at 10:09

## 2022-05-11 NOTE — PROGRESS NOTES
"      Rheumatology follow-up visit note     Kai is a 61 year old male presents today for follow-up.    Kai was seen today for recheck.    Diagnoses and all orders for this visit:    Psoriatic arthritis (H)  -     triamcinolone (KENALOG-40) injection 40 mg  -     ASPIRATION/INJECTION MAJOR JOINT  -     leflunomide (ARAVA) 20 MG tablet; TAKE 1 TABLET BY MOUTH DAILY    Psoriasis  -     leflunomide (ARAVA) 20 MG tablet; TAKE 1 TABLET BY MOUTH DAILY    Primary osteoarthritis involving multiple joints  -     triamcinolone (KENALOG-40) injection 40 mg  -     ASPIRATION/INJECTION MAJOR JOINT    High risk medication use    Chronic pain of right knee  -     triamcinolone (KENALOG-40) injection 40 mg  -     ASPIRATION/INJECTION MAJOR JOINT            After pros and cons were discussed including risk of infection, bleeding, skin changes including thinning, pigmentary alteration and scarring to name a few, right knee injected as noted in the orders section. This was done with nontouch technique.  The patient tolerated the procedure well and had a brisk Marcaine effect.  The postinjection care was discussed.      Follow up in 3 months.    HPI    Kai Jim is a 61 year old male is here for follow-up.  He has psoriatic arthritis, osteoarthritis, psoriasis and has been doing well on leflunomide.  During her recent evaluation at his new primary physician's office a question of a right renal mass came up he turned out to have a malignancy that he has undergone radical nephrectomy recently.  He understands that the margins were clear and there is no spread evidence.  During this time he held the leflunomide there was no flareup of joint symptoms apart from the right knee that has troubled him intermittently more so recently, going up and down the steps walking is impaired, he noted the pain level to be \"severe\".  Other joint areas little or no pain.  He has noted no upper stiffness in the morning.  Due for labs to be done " "today.    Used to be on Enbrel for several years before insurance dropped to the years ago.  Otezla was associated with gastrointestinal symptoms.  He has  family history of rheumatoid arthritis in father.   He is not a smoker alcohol occasionally.   He injured his right index and middle finger chopping wood many years ago.  That was in 1986.    He injured his left hand and a chainsaw.        DETAILED EXAMINATION  05/11/22  :    Vitals:    05/11/22 0845   BP: 134/88   BP Location: Right arm   Patient Position: Sitting   Cuff Size: Adult Regular   Pulse: 68   Weight: 96.6 kg (212 lb 14.4 oz)   Height: 1.803 m (5' 11\")     Alert oriented. Head including the face is examined for malar rash, heliotropes, scarring, lupus pernio. Eyes examined for redness such as in episcleritis/scleritis, periorbital lesions.   Neck/ Face examined for parotid gland swelling, range of motion of neck.  Left upper and lower and right upper and lower extremities examined for tenderness, swelling, warmth of the appendicular joints, range of motion, edema, rash.  Some of the important findings included: he does not have evidence of synovitis in the palpable joints of the upper extremities.  No significant deformities of the digits.  + Heberden nodes.  Range of motion of the shoulders  show full abduction.  The right knee is warmer, joint line tenderness.. 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     Patient Active Problem List    Diagnosis Date Noted     Renal mass 04/06/2022     Priority: Medium     Moderate major depression (H) 12/22/2010     Priority: Medium     Anxiety 12/22/2010     Priority: Medium     CARDIOVASCULAR SCREENING; LDL GOAL LESS THAN 130 10/31/2010     Priority: Medium     Past Surgical History:   Procedure Laterality Date     DAVINCI NEPHRECTOMY Right 4/6/2022    Procedure: NEPHRECTOMY, RADICAL, ROBOT-ASSISTED LAPAROSCOPIC;  Surgeon: Brad Luciano MD;  " "Location: [facility]     finger amputation surgery Right     Due to log splitter. x2 fingers     HERNIA REPAIR  01/01/1991    Right inguinal     PA LAP,INGUINAL HERNIA REPR,INITIAL Left 12/17/2020    Procedure: HERNIORRHAPHY, INGUINAL, LAPAROSCOPIC;  Surgeon: Jareth Julian MD;  Location: [facility];  Service: General      Past Medical History:   Diagnosis Date     BPH (benign prostatic hyperplasia)      ED (erectile dysfunction)      Family history of kidney stones      Gastroesophageal reflux disease      Hyperlipidemia      Hypertension      Kidney mass      Knee pain     right- \"bone on bone\"     Psoriatic arthritis (H) 01/08/2016     Seasonal allergies      Sinusitis      No Known Allergies  Current Outpatient Medications   Medication Sig Dispense Refill     fish oil-omega-3 fatty acids 1000 MG capsule Take 1 g by mouth daily       leflunomide (ARAVA) 20 MG tablet TAKE 1 TABLET BY MOUTH DAILY 90 tablet 0     Multiple Vitamin (MULTI VITAMIN) TABS Take 1 tablet by mouth daily        Probiotic Product (FLORAJEN BIFIDOBLEND PO) Take 1 capsule by mouth daily as needed       sildenafil (VIAGRA) 50 MG tablet Take  mg by mouth daily as needed Stop date 9/7/2022       Specialty Vitamins Products (PROSTATE PO) Take 1 capsule by mouth daily       Vitamin D3 (CHOLECALCIFEROL) 125 MCG (5000 UT) tablet Take 1 tablet by mouth daily       family history includes Heart Disease in his father and mother.  Social Connections: Not on file          WBC Count   Date Value Ref Range Status   01/07/2022 7.1 4.0 - 11.0 10e3/uL Final     RBC Count   Date Value Ref Range Status   01/07/2022 5.24 4.40 - 5.90 10e6/uL Final     Hemoglobin   Date Value Ref Range Status   04/08/2022 13.3 13.3 - 17.7 g/dL Final     Hematocrit   Date Value Ref Range Status   01/07/2022 46.5 40.0 - 53.0 % Final     MCV   Date Value Ref Range Status   01/07/2022 89 78 - 100 fL Final     MCH   Date Value Ref Range Status   01/07/2022 29.2 " 26.5 - 33.0 pg Final     Platelet Count   Date Value Ref Range Status   04/06/2022 234 150 - 450 10e3/uL Final     % Lymphocytes   Date Value Ref Range Status   04/17/2018 20 20 - 40 % Final     ALT   Date Value Ref Range Status   01/07/2022 19 0 - 45 U/L Final     Albumin   Date Value Ref Range Status   01/07/2022 3.9 3.5 - 5.0 g/dL Final     Creatinine   Date Value Ref Range Status   04/08/2022 1.45 (H) 0.70 - 1.30 mg/dL Final     GFR Estimate   Date Value Ref Range Status   04/08/2022 55 (L) >60 mL/min/1.73m2 Final     Comment:     Effective December 21, 2021 eGFRcr in adults is calculated using the 2021 CKD-EPI creatinine equation which includes age and gender (Roseline et al., NEJM, DOI: 10.1056/YHGTme0868846)   06/29/2021 >60 >60 mL/min/1.73m2 Final     GFR Estimate If Black   Date Value Ref Range Status   06/29/2021 >60 >60 mL/min/1.73m2 Final     Erythrocyte Sedimentation Rate   Date Value Ref Range Status   04/17/2018 15 0 - 15 mm/hr Final     CRP   Date Value Ref Range Status   04/17/2018 0.7 0.0 - 0.8 mg/dL Final

## 2022-07-11 ENCOUNTER — LAB (OUTPATIENT)
Dept: LAB | Facility: CLINIC | Age: 62
End: 2022-07-11
Payer: COMMERCIAL

## 2022-07-11 DIAGNOSIS — L40.50 PSORIATIC ARTHRITIS (H): ICD-10-CM

## 2022-07-11 LAB
ALBUMIN SERPL BCG-MCNC: 4.2 G/DL (ref 3.5–5.2)
ALT SERPL W P-5'-P-CCNC: 22 U/L (ref 10–50)
CREAT SERPL-MCNC: 1.27 MG/DL (ref 0.67–1.17)
ERYTHROCYTE [DISTWIDTH] IN BLOOD BY AUTOMATED COUNT: 14.3 % (ref 10–15)
GFR SERPL CREATININE-BSD FRML MDRD: 64 ML/MIN/1.73M2
HCT VFR BLD AUTO: 41.4 % (ref 40–53)
HGB BLD-MCNC: 13.8 G/DL (ref 13.3–17.7)
MCH RBC QN AUTO: 29.3 PG (ref 26.5–33)
MCHC RBC AUTO-ENTMCNC: 33.3 G/DL (ref 31.5–36.5)
MCV RBC AUTO: 88 FL (ref 78–100)
PLATELET # BLD AUTO: 269 10E3/UL (ref 150–450)
RBC # BLD AUTO: 4.71 10E6/UL (ref 4.4–5.9)
WBC # BLD AUTO: 6.4 10E3/UL (ref 4–11)

## 2022-07-11 PROCEDURE — 85027 COMPLETE CBC AUTOMATED: CPT

## 2022-07-11 PROCEDURE — 82565 ASSAY OF CREATININE: CPT

## 2022-07-11 PROCEDURE — 82040 ASSAY OF SERUM ALBUMIN: CPT

## 2022-07-11 PROCEDURE — 36415 COLL VENOUS BLD VENIPUNCTURE: CPT

## 2022-07-11 PROCEDURE — 84460 ALANINE AMINO (ALT) (SGPT): CPT

## 2022-07-16 DIAGNOSIS — L40.9 PSORIASIS: ICD-10-CM

## 2022-07-16 DIAGNOSIS — L40.50 PSORIATIC ARTHRITIS (H): ICD-10-CM

## 2022-07-18 RX ORDER — LEFLUNOMIDE 20 MG/1
TABLET ORAL
Qty: 90 TABLET | Refills: 0 | Status: SHIPPED | OUTPATIENT
Start: 2022-07-18 | End: 2023-01-17

## 2022-07-24 ENCOUNTER — HEALTH MAINTENANCE LETTER (OUTPATIENT)
Age: 62
End: 2022-07-24

## 2022-10-02 ENCOUNTER — HEALTH MAINTENANCE LETTER (OUTPATIENT)
Age: 62
End: 2022-10-02

## 2022-10-13 ENCOUNTER — LAB REQUISITION (OUTPATIENT)
Dept: LAB | Facility: CLINIC | Age: 62
End: 2022-10-13

## 2022-10-13 DIAGNOSIS — D22.9 MELANOCYTIC NEVI, UNSPECIFIED: ICD-10-CM

## 2022-10-13 PROCEDURE — 88305 TISSUE EXAM BY PATHOLOGIST: CPT | Performed by: PATHOLOGY

## 2022-10-17 ENCOUNTER — TRANSFERRED RECORDS (OUTPATIENT)
Dept: HEALTH INFORMATION MANAGEMENT | Facility: CLINIC | Age: 62
End: 2022-10-17

## 2022-10-18 LAB
PATH REPORT.COMMENTS IMP SPEC: NORMAL
PATH REPORT.COMMENTS IMP SPEC: NORMAL
PATH REPORT.FINAL DX SPEC: NORMAL
PATH REPORT.GROSS SPEC: NORMAL
PATH REPORT.MICROSCOPIC SPEC OTHER STN: NORMAL
PATH REPORT.RELEVANT HX SPEC: NORMAL
PHOTO IMAGE: NORMAL

## 2022-10-24 ENCOUNTER — TELEPHONE (OUTPATIENT)
Dept: RHEUMATOLOGY | Facility: CLINIC | Age: 62
End: 2022-10-24

## 2022-10-24 DIAGNOSIS — L40.50 PSORIATIC ARTHRITIS (H): Primary | ICD-10-CM

## 2022-10-25 NOTE — TELEPHONE ENCOUNTER
[facility] Call Center    Phone Message    May a detailed message be left on voicemail: yes     Reason for Call: Pt had to reschedule his 10/24/22 appt for Dr. Huff's next available appt on 1/17/23. He would like to make sure he stays up-to-date on his labs so he can get refills when needed (does not need refills right now).   Pt is thinking he likely needs to have lab tests right now, and then again right before his appt with Dr. Huff in January? Would like to know if this is correct, and if not, when should he have lab tests done?   Would also like orders placed for any necessary labs (no future/standing orders seen).     Action Taken: Message routed to:  Other: RHEUMATOLOGY SUPPORT POOL    Travel Screening: Not Applicable  
Please call to schedule lab appt now for Dr Huff and then lab appt again just prior to f/u appt in January     Lab orders in chart.   
Spoke to pt, lab appts scheduled 11/4/22 and 1/16/23.   
distended/soft/tender

## 2022-11-14 ENCOUNTER — LAB (OUTPATIENT)
Dept: LAB | Facility: CLINIC | Age: 62
End: 2022-11-14
Payer: COMMERCIAL

## 2022-11-14 DIAGNOSIS — L40.50 PSORIATIC ARTHRITIS (H): ICD-10-CM

## 2022-11-14 LAB
ALBUMIN SERPL BCG-MCNC: 4.1 G/DL (ref 3.5–5.2)
ALT SERPL W P-5'-P-CCNC: 26 U/L (ref 10–50)
CREAT SERPL-MCNC: 1.24 MG/DL (ref 0.67–1.17)
ERYTHROCYTE [DISTWIDTH] IN BLOOD BY AUTOMATED COUNT: 13.3 % (ref 10–15)
GFR SERPL CREATININE-BSD FRML MDRD: 66 ML/MIN/1.73M2
HCT VFR BLD AUTO: 40.9 % (ref 40–53)
HGB BLD-MCNC: 13.6 G/DL (ref 13.3–17.7)
MCH RBC QN AUTO: 29.4 PG (ref 26.5–33)
MCHC RBC AUTO-ENTMCNC: 33.3 G/DL (ref 31.5–36.5)
MCV RBC AUTO: 88 FL (ref 78–100)
PLATELET # BLD AUTO: 245 10E3/UL (ref 150–450)
RBC # BLD AUTO: 4.63 10E6/UL (ref 4.4–5.9)
WBC # BLD AUTO: 5.5 10E3/UL (ref 4–11)

## 2022-11-14 PROCEDURE — 84460 ALANINE AMINO (ALT) (SGPT): CPT

## 2022-11-14 PROCEDURE — 82565 ASSAY OF CREATININE: CPT

## 2022-11-14 PROCEDURE — 36415 COLL VENOUS BLD VENIPUNCTURE: CPT

## 2022-11-14 PROCEDURE — 85027 COMPLETE CBC AUTOMATED: CPT

## 2022-11-14 PROCEDURE — 82040 ASSAY OF SERUM ALBUMIN: CPT

## 2022-11-25 ENCOUNTER — LAB REQUISITION (OUTPATIENT)
Dept: LAB | Facility: CLINIC | Age: 62
End: 2022-11-25

## 2022-11-25 DIAGNOSIS — Z01.818 ENCOUNTER FOR OTHER PREPROCEDURAL EXAMINATION: ICD-10-CM

## 2022-11-25 LAB
ANION GAP SERPL CALCULATED.3IONS-SCNC: 13 MMOL/L (ref 7–15)
BUN SERPL-MCNC: 16.8 MG/DL (ref 8–23)
CALCIUM SERPL-MCNC: 9.5 MG/DL (ref 8.8–10.2)
CHLORIDE SERPL-SCNC: 103 MMOL/L (ref 98–107)
CREAT SERPL-MCNC: 1.35 MG/DL (ref 0.67–1.17)
DEPRECATED HCO3 PLAS-SCNC: 25 MMOL/L (ref 22–29)
GFR SERPL CREATININE-BSD FRML MDRD: 59 ML/MIN/1.73M2
GLUCOSE SERPL-MCNC: 89 MG/DL (ref 70–99)
POTASSIUM SERPL-SCNC: 4.6 MMOL/L (ref 3.4–5.3)
SODIUM SERPL-SCNC: 141 MMOL/L (ref 136–145)

## 2022-11-25 PROCEDURE — 80048 BASIC METABOLIC PNL TOTAL CA: CPT | Performed by: PHYSICIAN ASSISTANT

## 2022-12-13 ENCOUNTER — LAB REQUISITION (OUTPATIENT)
Dept: LAB | Facility: CLINIC | Age: 62
End: 2022-12-13
Payer: COMMERCIAL

## 2022-12-13 DIAGNOSIS — Z01.812 ENCOUNTER FOR PREPROCEDURAL LABORATORY EXAMINATION: ICD-10-CM

## 2022-12-13 LAB — SARS-COV-2 RNA RESP QL NAA+PROBE: NEGATIVE

## 2022-12-13 PROCEDURE — U0005 INFEC AGEN DETEC AMPLI PROBE: HCPCS | Mod: ORL | Performed by: PHYSICIAN ASSISTANT

## 2023-01-16 ENCOUNTER — LAB (OUTPATIENT)
Dept: LAB | Facility: CLINIC | Age: 63
End: 2023-01-16
Payer: COMMERCIAL

## 2023-01-16 DIAGNOSIS — L40.50 PSORIATIC ARTHRITIS (H): ICD-10-CM

## 2023-01-16 LAB
ALBUMIN SERPL BCG-MCNC: 4.2 G/DL (ref 3.5–5.2)
ALT SERPL W P-5'-P-CCNC: 33 U/L (ref 10–50)
CREAT SERPL-MCNC: 1.3 MG/DL (ref 0.67–1.17)
ERYTHROCYTE [DISTWIDTH] IN BLOOD BY AUTOMATED COUNT: 12.8 % (ref 10–15)
GFR SERPL CREATININE-BSD FRML MDRD: 62 ML/MIN/1.73M2
HCT VFR BLD AUTO: 38.4 % (ref 40–53)
HGB BLD-MCNC: 12.8 G/DL (ref 13.3–17.7)
MCH RBC QN AUTO: 29.6 PG (ref 26.5–33)
MCHC RBC AUTO-ENTMCNC: 33.3 G/DL (ref 31.5–36.5)
MCV RBC AUTO: 89 FL (ref 78–100)
PLATELET # BLD AUTO: 280 10E3/UL (ref 150–450)
RBC # BLD AUTO: 4.33 10E6/UL (ref 4.4–5.9)
WBC # BLD AUTO: 5.8 10E3/UL (ref 4–11)

## 2023-01-16 PROCEDURE — 82565 ASSAY OF CREATININE: CPT

## 2023-01-16 PROCEDURE — 82040 ASSAY OF SERUM ALBUMIN: CPT

## 2023-01-16 PROCEDURE — 84460 ALANINE AMINO (ALT) (SGPT): CPT

## 2023-01-16 PROCEDURE — 36415 COLL VENOUS BLD VENIPUNCTURE: CPT

## 2023-01-16 PROCEDURE — 85027 COMPLETE CBC AUTOMATED: CPT

## 2023-01-17 ENCOUNTER — OFFICE VISIT (OUTPATIENT)
Dept: RHEUMATOLOGY | Facility: CLINIC | Age: 63
End: 2023-01-17
Payer: COMMERCIAL

## 2023-01-17 VITALS
BODY MASS INDEX: 29.12 KG/M2 | SYSTOLIC BLOOD PRESSURE: 150 MMHG | WEIGHT: 208.8 LBS | DIASTOLIC BLOOD PRESSURE: 90 MMHG | HEART RATE: 84 BPM

## 2023-01-17 DIAGNOSIS — M15.0 PRIMARY OSTEOARTHRITIS INVOLVING MULTIPLE JOINTS: ICD-10-CM

## 2023-01-17 DIAGNOSIS — Z79.899 HIGH RISK MEDICATION USE: ICD-10-CM

## 2023-01-17 DIAGNOSIS — L40.50 PSORIATIC ARTHRITIS (H): Primary | ICD-10-CM

## 2023-01-17 DIAGNOSIS — L40.9 PSORIASIS: ICD-10-CM

## 2023-01-17 PROCEDURE — 99214 OFFICE O/P EST MOD 30 MIN: CPT | Performed by: INTERNAL MEDICINE

## 2023-01-17 RX ORDER — ASPIRIN 325 MG
TABLET ORAL
COMMUNITY
Start: 2022-12-20 | End: 2023-02-27

## 2023-01-17 RX ORDER — SENNOSIDES A AND B 8.6 MG/1
1 TABLET, FILM COATED ORAL DAILY
COMMUNITY
End: 2023-02-27

## 2023-01-17 RX ORDER — ACETAMINOPHEN 325 MG/1
325-650 TABLET ORAL EVERY 6 HOURS PRN
COMMUNITY
End: 2023-02-27

## 2023-01-17 RX ORDER — TRAMADOL HYDROCHLORIDE 50 MG/1
50 TABLET ORAL EVERY 6 HOURS PRN
COMMUNITY
Start: 2023-01-06 | End: 2023-02-27

## 2023-01-17 RX ORDER — LEFLUNOMIDE 20 MG/1
20 TABLET ORAL DAILY
Qty: 90 TABLET | Refills: 0 | Status: SHIPPED | OUTPATIENT
Start: 2023-01-17 | End: 2023-04-18

## 2023-01-17 NOTE — PROGRESS NOTES
Rheumatology follow-up visit note     Kai is a 62 year old male presents today for follow-up.    Kai was seen today for recheck.    Diagnoses and all orders for this visit:    Psoriatic arthritis (H)  -     leflunomide (ARAVA) 20 MG tablet; Take 1 tablet (20 mg) by mouth daily    Psoriasis  -     leflunomide (ARAVA) 20 MG tablet; Take 1 tablet (20 mg) by mouth daily    Primary osteoarthritis involving multiple joints    High risk medication use        Well-controlled psoriatic arthritis continue leflunomide.  Osteoarthritis status post right arthroplasty which appears to have been good intervention for him.  We will stay the course.  We will meet here in 6 months labs every 3.    Follow up in 6 months.    HPI    Kai Jim is a 62 year old male is here for follow-up.  He has psoriatic arthritis, osteoarthritis, psoriasis and has been doing well on leflunomide.  4 weeks ago he underwent right knee arthroplasty and has done well since.  He had leflunomide for 7 days prior.  He has mild renal impairment.  He has been taking Tylenol for the right knee pain without help. Other joint areas little or no pain.  He has noted no upper stiffness in the morning.  Recent labs reviewed within acceptable range minimal drop in kidney function.Used to be on Enbrel for several years before insurance dropped to the years ago.  Otezla was associated with gastrointestinal symptoms.  He has  family history of rheumatoid arthritis in father.   He is not a smoker alcohol occasionally.   He injured his right index and middle finger chopping wood many years ago.  That was in 1986.    He injured his left hand and a chainsaw      DETAILED EXAMINATION  01/17/23  :    Vitals:    01/17/23 1240   BP: (!) 150/90   Pulse: 84     Alert oriented. Head including the face is examined for malar rash, heliotropes, scarring, lupus pernio. Eyes examined for redness such as in episcleritis/scleritis, periorbital lesions.   Neck/ Face  "examined for parotid gland swelling, range of motion of neck.  Left upper and lower and right upper and lower extremities examined for tenderness, swelling, warmth of the appendicular joints, range of motion, edema, rash.  Some of the important findings included: he does not have evidence of synovitis in the palpable joints of the upper extremities.  The right knee status post arthroplasty nicely healed incision.  It is warm.  This is as expected.  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  Patient Active Problem List    Diagnosis Date Noted     Renal mass 04/06/2022     Priority: Medium     Moderate major depression (H) 12/22/2010     Priority: Medium     Anxiety 12/22/2010     Priority: Medium     CARDIOVASCULAR SCREENING; LDL GOAL LESS THAN 130 10/31/2010     Priority: Medium     Past Surgical History:   Procedure Laterality Date     DAVINCI NEPHRECTOMY Right 4/6/2022    Procedure: NEPHRECTOMY, RADICAL, ROBOT-ASSISTED LAPAROSCOPIC;  Surgeon: Brad Luciano MD;  Location: [facility] [place]     finger amputation surgery Right     Due to log splitter. x2 fingers     HERNIA REPAIR  01/01/1991    Right inguinal     [place] LAP,INGUINAL HERNIA REPR,INITIAL Left 12/17/2020    Procedure: HERNIORRHAPHY, INGUINAL, LAPAROSCOPIC;  Surgeon: Jareth Julian MD;  Location: [facility];  Service: General      Past Medical History:   Diagnosis Date     BPH (benign prostatic hyperplasia)      ED (erectile dysfunction)      Family history of kidney stones      Gastroesophageal reflux disease      Hyperlipidemia      Hypertension      Kidney mass      Knee pain     right- \"bone on bone\"     Psoriatic arthritis (H) 01/08/2016     Seasonal allergies      Sinusitis      No Known Allergies  Current Outpatient Medications   Medication Sig Dispense Refill     acetaminophen (TYLENOL) 325 MG tablet Take 325-650 mg by mouth every 6 hours as needed for mild pain       aspirin (ASA) 325 MG tablet TAKE 1 TABLET BY MOUTH DAILY FOR 6 " WEEKS FOR BLOOD CLOT PREVENTION       fish oil-omega-3 fatty acids 1000 MG capsule Take 1 g by mouth daily       leflunomide (ARAVA) 20 MG tablet TAKE 1 TABLET BY MOUTH DAILY 90 tablet 0     Multiple Vitamin (MULTI VITAMIN) TABS Take 1 tablet by mouth daily        omeprazole (PRILOSEC) 20 MG DR capsule omeprazole 20 mg capsule,delayed release   TAKE 1 CAPSULE BY MOUTH EVERY DAY 30 MINUTES BEFORE MORNING MEAL       senna (SENOKOT) 8.6 MG tablet Take 1 tablet by mouth daily       sildenafil (VIAGRA) 50 MG tablet Take  mg by mouth daily as needed Stop date 9/7/2022       traMADol (ULTRAM) 50 MG tablet Take 50 mg by mouth every 6 hours as needed       Vitamin D3 (CHOLECALCIFEROL) 125 MCG (5000 UT) tablet Take 1 tablet by mouth daily       family history includes Heart Disease in his father and mother.  Social Connections: Not on file          WBC Count   Date Value Ref Range Status   01/16/2023 5.8 4.0 - 11.0 10e3/uL Final     RBC Count   Date Value Ref Range Status   01/16/2023 4.33 (L) 4.40 - 5.90 10e6/uL Final     Hemoglobin   Date Value Ref Range Status   01/16/2023 12.8 (L) 13.3 - 17.7 g/dL Final     Hematocrit   Date Value Ref Range Status   01/16/2023 38.4 (L) 40.0 - 53.0 % Final     MCV   Date Value Ref Range Status   01/16/2023 89 78 - 100 fL Final     MCH   Date Value Ref Range Status   01/16/2023 29.6 26.5 - 33.0 pg Final     Platelet Count   Date Value Ref Range Status   01/16/2023 280 150 - 450 10e3/uL Final     % Lymphocytes   Date Value Ref Range Status   04/17/2018 20 20 - 40 % Final     ALT   Date Value Ref Range Status   01/16/2023 33 10 - 50 U/L Final     Albumin   Date Value Ref Range Status   01/16/2023 4.2 3.5 - 5.2 g/dL Final   05/11/2022 3.8 3.5 - 5.0 g/dL Final     Creatinine   Date Value Ref Range Status   01/16/2023 1.30 (H) 0.67 - 1.17 mg/dL Final     GFR Estimate   Date Value Ref Range Status   01/16/2023 62 >60 mL/min/1.73m2 Final     Comment:     Effective December 21, 2021 eGFRcr in  adults is calculated using the 2021 CKD-EPI creatinine equation which includes age and gender (Roseline et al., NEJ, DOI: 10.1056/CXVEbo9898209)   06/29/2021 >60 >60 mL/min/1.73m2 Final     GFR Estimate If Black   Date Value Ref Range Status   06/29/2021 >60 >60 mL/min/1.73m2 Final     Erythrocyte Sedimentation Rate   Date Value Ref Range Status   04/17/2018 15 0 - 15 mm/hr Final     CRP   Date Value Ref Range Status   04/17/2018 0.7 0.0 - 0.8 mg/dL Final

## 2023-02-19 ENCOUNTER — MEDICAL CORRESPONDENCE (OUTPATIENT)
Dept: HEALTH INFORMATION MANAGEMENT | Facility: CLINIC | Age: 63
End: 2023-02-19

## 2023-02-20 ENCOUNTER — TRANSCRIBE ORDERS (OUTPATIENT)
Dept: OTHER | Age: 63
End: 2023-02-20

## 2023-02-20 DIAGNOSIS — K43.2 INCISIONAL HERNIA, WITHOUT OBSTRUCTION OR GANGRENE: Primary | ICD-10-CM

## 2023-02-27 ENCOUNTER — OFFICE VISIT (OUTPATIENT)
Dept: SURGERY | Facility: CLINIC | Age: 63
End: 2023-02-27
Attending: UROLOGY
Payer: COMMERCIAL

## 2023-02-27 VITALS
WEIGHT: 205 LBS | DIASTOLIC BLOOD PRESSURE: 88 MMHG | HEIGHT: 71 IN | SYSTOLIC BLOOD PRESSURE: 130 MMHG | BODY MASS INDEX: 28.7 KG/M2

## 2023-02-27 DIAGNOSIS — K40.90 NON-RECURRENT UNILATERAL INGUINAL HERNIA WITHOUT OBSTRUCTION OR GANGRENE: Primary | ICD-10-CM

## 2023-02-27 DIAGNOSIS — K43.2 INCISIONAL HERNIA, WITHOUT OBSTRUCTION OR GANGRENE: ICD-10-CM

## 2023-02-27 PROCEDURE — 99213 OFFICE O/P EST LOW 20 MIN: CPT | Performed by: SPECIALIST

## 2023-02-27 RX ORDER — CEFAZOLIN SODIUM/WATER 2 G/20 ML
2 SYRINGE (ML) INTRAVENOUS
Status: CANCELLED | OUTPATIENT
Start: 2023-04-27

## 2023-02-27 RX ORDER — CEFAZOLIN SODIUM/WATER 2 G/20 ML
2 SYRINGE (ML) INTRAVENOUS SEE ADMIN INSTRUCTIONS
Status: CANCELLED | OUTPATIENT
Start: 2023-04-27

## 2023-02-27 RX ORDER — ACETAMINOPHEN 325 MG/1
975 TABLET ORAL ONCE
Status: CANCELLED | OUTPATIENT
Start: 2023-04-27 | End: 2023-02-27

## 2023-02-27 NOTE — LETTER
"    2/27/2023         RE: Kai Jim  [address] [place] [address]        Dear Colleague,    Thank you for referring your patient, Kai Jim, to the [facility] [place]. Please see a copy of my visit note below.          HPI:  This is a 62 year old male here today with concerns of pain and bulging in his  bilateral groins, ventral and incisional area. He has noted this for the past a a few  Years. The symptoms have progressed and increased over this time. He comes in for evaluation secondary to the hernia causing enough issues to bother them with daily activities or chores.    Allergies:Patient has no known allergies.    Past Medical History:   Diagnosis Date     BPH (benign prostatic hyperplasia)      ED (erectile dysfunction)      Family history of kidney stones      Gastroesophageal reflux disease      Hyperlipidemia      Hypertension      Kidney mass      Knee pain     right- \"bone on bone\"     Psoriatic arthritis (H) 01/08/2016     Seasonal allergies      Sinusitis        Past Surgical History:   Procedure Laterality Date     DAVINCI NEPHRECTOMY Right 4/6/2022    Procedure: NEPHRECTOMY, RADICAL, ROBOT-ASSISTED LAPAROSCOPIC;  Surgeon: Brad Luciano MD;  Location: [facility]     finger amputation surgery Right     Due to log splitter. x2 fingers     HERNIA REPAIR  01/01/1991    Right inguinal     [place] LAP,INGUINAL HERNIA REPR,INITIAL Left 12/17/2020    Procedure: HERNIORRHAPHY, INGUINAL, LAPAROSCOPIC;  Surgeon: Jareth Julian MD;  Location: [facility];  Service: General       CURRENT MEDS:      Family History   Problem Relation Age of Onset     Heart Disease Mother      Heart Disease Father         reports that he quit smoking about 3 years ago. His smoking use included cigars. He has never used smokeless tobacco. He reports current alcohol use. He reports that he does not currently use drugs.    Review of Systems - Negative except " "ventral hernia and inguinal hernia which bother him.  I have bulge and pain.Otherwise twelve system of review is negative.      Vitals:    02/27/23 0852   BP: 130/88   Weight: 93 kg (205 lb)   Height: 1.803 m (5' 11\")       Body mass index is 28.59 kg/m .    EXAM:  General: NAD   HEENT: normocephalic, PERRLA and EOMS intact  Mounth: Mucus membranes moist  Neck: Supple  Chest: Clear to auscultation bilaterally  CV: RRR  ABD: Soft nontender and nondistended, bilateral inguinal hernia, ventral hernia and incisional hernia, reducible  EXT: Warm, pulses intact,   Neuro: Alert and oriented x3  Back: no CVA tenderness      IMAGES:     Reviewed CT scan from 10/22 showing the hernias.  These are outside films but we have the images transferred    Assessment/Plan: Pt with a bilateral inguinal hernia, ventral hernia and incisional hernia. I discussed this at length with He.  I went over conservative management as well as surgical treatment of this.. I would plan on doing this via anrobotic  approach with possible use of mesh. I went over the small risks of surgery including but not limited to bleeding and infection, anesthesia, recurrence rates and nerve injury. I discussed the expected recovery time as well. Will get this scheduled. He will contact us to have this scheduled.      Jareth Julian MD ,MD Jareth Julian MD  General Surgery [phone]  Vascular Surgery [phone]              Again, thank you for allowing me to participate in the care of your patient.        Sincerely,        Jareth Julian MD    "

## 2023-02-27 NOTE — PROGRESS NOTES
"      HPI:  This is a 62 year old male here today with concerns of pain and bulging in his  bilateral groins, ventral and incisional area. He has noted this for the past a a few  Years. The symptoms have progressed and increased over this time. He comes in for evaluation secondary to the hernia causing enough issues to bother them with daily activities or chores.    Allergies:Patient has no known allergies.    Past Medical History:   Diagnosis Date     BPH (benign prostatic hyperplasia)      ED (erectile dysfunction)      Family history of kidney stones      Gastroesophageal reflux disease      Hyperlipidemia      Hypertension      Kidney mass      Knee pain     right- \"bone on bone\"     Psoriatic arthritis (H) 01/08/2016     Seasonal allergies      Sinusitis        Past Surgical History:   Procedure Laterality Date     DAVINCI NEPHRECTOMY Right 4/6/2022    Procedure: NEPHRECTOMY, RADICAL, ROBOT-ASSISTED LAPAROSCOPIC;  Surgeon: Brad Luciano MD;  Location: [facility] [place]     finger amputation surgery Right     Due to log splitter. x2 fingers     HERNIA REPAIR  01/01/1991    Right inguinal     [place] LAP,INGUINAL HERNIA REPR,INITIAL Left 12/17/2020    Procedure: HERNIORRHAPHY, INGUINAL, LAPAROSCOPIC;  Surgeon: Jareth Julian MD;  Location: [facility];  Service: General       CURRENT MEDS:      Family History   Problem Relation Age of Onset     Heart Disease Mother      Heart Disease Father         reports that he quit smoking about 3 years ago. His smoking use included cigars. He has never used smokeless tobacco. He reports current alcohol use. He reports that he does not currently use drugs.    Review of Systems - Negative except ventral hernia and inguinal hernia which bother him.  I have bulge and pain.Otherwise twelve system of review is negative.      Vitals:    02/27/23 0852   BP: 130/88   Weight: 93 kg (205 lb)   Height: 1.803 m (5' 11\")       Body mass index is 28.59 kg/m .    EXAM:  General: NAD "   HEENT: normocephalic, PERRLA and EOMS intact  Mounth: Mucus membranes moist  Neck: Supple  Chest: Clear to auscultation bilaterally  CV: RRR  ABD: Soft nontender and nondistended, bilateral inguinal hernia, ventral hernia and incisional hernia, reducible  EXT: Warm, pulses intact,   Neuro: Alert and oriented x3  Back: no CVA tenderness      IMAGES:     Reviewed CT scan from 10/22 showing the hernias.  These are outside films but we have the images transferred    Assessment/Plan: Pt with a bilateral inguinal hernia, ventral hernia and incisional hernia. I discussed this at length with He.  I went over conservative management as well as surgical treatment of this.. I would plan on doing this via anrobotic  approach with possible use of mesh. I went over the small risks of surgery including but not limited to bleeding and infection, anesthesia, recurrence rates and nerve injury. I discussed the expected recovery time as well. Will get this scheduled. He will contact us to have this scheduled.      Jareth Julian MD ,MD Jareth Julian MD  General Surgery [phone]  Vascular Surgery [phone]

## 2023-02-28 ENCOUNTER — TELEPHONE (OUTPATIENT)
Dept: SURGERY | Facility: CLINIC | Age: 63
End: 2023-02-28
Payer: COMMERCIAL

## 2023-02-28 NOTE — TELEPHONE ENCOUNTER
Spoke with Nir who stated he was not ready to schedule surgery yet. He has taken a lot of time off of work recently and needs to make plans and figure out the details first.     He will contact us when ready to schedule

## 2023-04-06 ENCOUNTER — LAB REQUISITION (OUTPATIENT)
Dept: LAB | Facility: CLINIC | Age: 63
End: 2023-04-06
Payer: COMMERCIAL

## 2023-04-06 DIAGNOSIS — Z90.5 ACQUIRED ABSENCE OF KIDNEY: ICD-10-CM

## 2023-04-06 DIAGNOSIS — Z01.818 ENCOUNTER FOR OTHER PREPROCEDURAL EXAMINATION: ICD-10-CM

## 2023-04-06 PROCEDURE — 80048 BASIC METABOLIC PNL TOTAL CA: CPT | Mod: ORL | Performed by: PHYSICIAN ASSISTANT

## 2023-04-07 LAB
ANION GAP SERPL CALCULATED.3IONS-SCNC: 13 MMOL/L (ref 7–15)
BUN SERPL-MCNC: 20 MG/DL (ref 8–23)
CALCIUM SERPL-MCNC: 9.4 MG/DL (ref 8.8–10.2)
CHLORIDE SERPL-SCNC: 104 MMOL/L (ref 98–107)
CREAT SERPL-MCNC: 1.28 MG/DL (ref 0.67–1.17)
DEPRECATED HCO3 PLAS-SCNC: 24 MMOL/L (ref 22–29)
GFR SERPL CREATININE-BSD FRML MDRD: 63 ML/MIN/1.73M2
GLUCOSE SERPL-MCNC: 88 MG/DL (ref 70–99)
POTASSIUM SERPL-SCNC: 4.4 MMOL/L (ref 3.4–5.3)
SODIUM SERPL-SCNC: 141 MMOL/L (ref 136–145)

## 2023-04-17 ENCOUNTER — LAB (OUTPATIENT)
Dept: LAB | Facility: CLINIC | Age: 63
End: 2023-04-17
Payer: COMMERCIAL

## 2023-04-17 DIAGNOSIS — L40.50 PSORIATIC ARTHRITIS (H): ICD-10-CM

## 2023-04-17 LAB
ALBUMIN SERPL BCG-MCNC: 4.3 G/DL (ref 3.5–5.2)
ALT SERPL W P-5'-P-CCNC: 17 U/L (ref 10–50)
CREAT SERPL-MCNC: 1.27 MG/DL (ref 0.67–1.17)
ERYTHROCYTE [DISTWIDTH] IN BLOOD BY AUTOMATED COUNT: 13.8 % (ref 10–15)
GFR SERPL CREATININE-BSD FRML MDRD: 64 ML/MIN/1.73M2
HCT VFR BLD AUTO: 41.6 % (ref 40–53)
HGB BLD-MCNC: 13.7 G/DL (ref 13.3–17.7)
MCH RBC QN AUTO: 29 PG (ref 26.5–33)
MCHC RBC AUTO-ENTMCNC: 32.9 G/DL (ref 31.5–36.5)
MCV RBC AUTO: 88 FL (ref 78–100)
PLATELET # BLD AUTO: 260 10E3/UL (ref 150–450)
RBC # BLD AUTO: 4.73 10E6/UL (ref 4.4–5.9)
WBC # BLD AUTO: 6.3 10E3/UL (ref 4–11)

## 2023-04-17 PROCEDURE — 82040 ASSAY OF SERUM ALBUMIN: CPT

## 2023-04-17 PROCEDURE — 82565 ASSAY OF CREATININE: CPT

## 2023-04-17 PROCEDURE — 85027 COMPLETE CBC AUTOMATED: CPT

## 2023-04-17 PROCEDURE — 84460 ALANINE AMINO (ALT) (SGPT): CPT

## 2023-04-17 PROCEDURE — 36415 COLL VENOUS BLD VENIPUNCTURE: CPT

## 2023-04-18 DIAGNOSIS — L40.9 PSORIASIS: ICD-10-CM

## 2023-04-18 DIAGNOSIS — L40.50 PSORIATIC ARTHRITIS (H): ICD-10-CM

## 2023-04-18 RX ORDER — LEFLUNOMIDE 20 MG/1
TABLET ORAL
Qty: 90 TABLET | Refills: 0 | Status: SHIPPED | OUTPATIENT
Start: 2023-04-18 | End: 2023-07-17

## 2023-04-27 ENCOUNTER — ANESTHESIA (OUTPATIENT)
Dept: SURGERY | Facility: HOSPITAL | Age: 63
End: 2023-04-27
Payer: COMMERCIAL

## 2023-04-27 ENCOUNTER — ANESTHESIA EVENT (OUTPATIENT)
Dept: SURGERY | Facility: HOSPITAL | Age: 63
End: 2023-04-27
Payer: COMMERCIAL

## 2023-04-27 ENCOUNTER — HOSPITAL ENCOUNTER (OUTPATIENT)
Facility: HOSPITAL | Age: 63
Discharge: HOME | End: 2023-04-27
Attending: SPECIALIST | Admitting: SPECIALIST
Payer: COMMERCIAL

## 2023-04-27 VITALS
DIASTOLIC BLOOD PRESSURE: 84 MMHG | BODY MASS INDEX: 27.93 KG/M2 | SYSTOLIC BLOOD PRESSURE: 145 MMHG | HEIGHT: 71 IN | WEIGHT: 199.52 LBS | RESPIRATION RATE: 18 BRPM | TEMPERATURE: 97.8 F | OXYGEN SATURATION: 98 % | HEART RATE: 81 BPM

## 2023-04-27 DIAGNOSIS — K40.90 RIGHT INGUINAL HERNIA: ICD-10-CM

## 2023-04-27 DIAGNOSIS — K43.9 VENTRAL HERNIA WITHOUT OBSTRUCTION OR GANGRENE: Primary | ICD-10-CM

## 2023-04-27 PROCEDURE — 258N000003 HC RX IP 258 OP 636: Performed by: ANESTHESIOLOGY

## 2023-04-27 PROCEDURE — 250N000025 HC SEVOFLURANE, PER MIN: Performed by: SPECIALIST

## 2023-04-27 PROCEDURE — 250N000013 HC RX MED GY IP 250 OP 250 PS 637: Performed by: SPECIALIST

## 2023-04-27 PROCEDURE — 370N000017 HC ANESTHESIA TECHNICAL FEE, PER MIN: Performed by: SPECIALIST

## 2023-04-27 PROCEDURE — 710N000012 HC RECOVERY PHASE 2, PER MINUTE: Performed by: SPECIALIST

## 2023-04-27 PROCEDURE — S2900 ROBOTIC SURGICAL SYSTEM: HCPCS | Performed by: SPECIALIST

## 2023-04-27 PROCEDURE — 250N000011 HC RX IP 250 OP 636: Performed by: SPECIALIST

## 2023-04-27 PROCEDURE — 250N000013 HC RX MED GY IP 250 OP 250 PS 637: Performed by: ANESTHESIOLOGY

## 2023-04-27 PROCEDURE — 999N000141 HC STATISTIC PRE-PROCEDURE NURSING ASSESSMENT: Performed by: SPECIALIST

## 2023-04-27 PROCEDURE — 272N000001 HC OR GENERAL SUPPLY STERILE: Performed by: SPECIALIST

## 2023-04-27 PROCEDURE — 250N000011 HC RX IP 250 OP 636: Performed by: NURSE ANESTHETIST, CERTIFIED REGISTERED

## 2023-04-27 PROCEDURE — 250N000011 HC RX IP 250 OP 636: Performed by: ANESTHESIOLOGY

## 2023-04-27 PROCEDURE — 710N000009 HC RECOVERY PHASE 1, LEVEL 1, PER MIN: Performed by: SPECIALIST

## 2023-04-27 PROCEDURE — C1781 MESH (IMPLANTABLE): HCPCS | Performed by: SPECIALIST

## 2023-04-27 PROCEDURE — 360N000080 HC SURGERY LEVEL 7, PER MIN: Performed by: SPECIALIST

## 2023-04-27 PROCEDURE — 49593 RPR AA HRN 1ST 3-10 RDC: CPT | Mod: 59 | Performed by: SPECIALIST

## 2023-04-27 PROCEDURE — 250N000009 HC RX 250: Performed by: NURSE ANESTHETIST, CERTIFIED REGISTERED

## 2023-04-27 PROCEDURE — 49650 LAP ING HERNIA REPAIR INIT: CPT | Mod: RT | Performed by: SPECIALIST

## 2023-04-27 DEVICE — COMPOSITE MESH MONOFILAMENT POLYPROPYLENE MESH WITH ABSORBABLE SYNTHETIC FILM AND MARKING
Type: IMPLANTABLE DEVICE | Site: ABDOMEN | Status: FUNCTIONAL
Brand: PARIETENE DS

## 2023-04-27 DEVICE — LAPAROSCOPIC SELF-FIXATING MESH POLYESTER WITH POLYLACTIC ACID GRIPS AND COLLAGEN FILM
Type: IMPLANTABLE DEVICE | Site: INGUINAL | Status: FUNCTIONAL
Brand: PROGRIP

## 2023-04-27 RX ORDER — LIDOCAINE 40 MG/G
CREAM TOPICAL
Status: DISCONTINUED | OUTPATIENT
Start: 2023-04-27 | End: 2023-04-27 | Stop reason: HOSPADM

## 2023-04-27 RX ORDER — ONDANSETRON 2 MG/ML
4 INJECTION INTRAMUSCULAR; INTRAVENOUS EVERY 30 MIN PRN
Status: CANCELLED | OUTPATIENT
Start: 2023-04-27

## 2023-04-27 RX ORDER — KETAMINE HYDROCHLORIDE 10 MG/ML
INJECTION INTRAMUSCULAR; INTRAVENOUS PRN
Status: DISCONTINUED | OUTPATIENT
Start: 2023-04-27 | End: 2023-04-27

## 2023-04-27 RX ORDER — FENTANYL CITRATE 50 UG/ML
25 INJECTION, SOLUTION INTRAMUSCULAR; INTRAVENOUS EVERY 5 MIN PRN
Status: DISCONTINUED | OUTPATIENT
Start: 2023-04-27 | End: 2023-04-27 | Stop reason: HOSPADM

## 2023-04-27 RX ORDER — OXYCODONE AND ACETAMINOPHEN 5; 325 MG/1; MG/1
1-2 TABLET ORAL EVERY 6 HOURS PRN
Qty: 12 TABLET | Refills: 0 | Status: SHIPPED | OUTPATIENT
Start: 2023-04-27 | End: 2023-04-30

## 2023-04-27 RX ORDER — FENTANYL CITRATE 50 UG/ML
50 INJECTION, SOLUTION INTRAMUSCULAR; INTRAVENOUS EVERY 5 MIN PRN
Status: CANCELLED | OUTPATIENT
Start: 2023-04-27

## 2023-04-27 RX ORDER — ACETAMINOPHEN 325 MG/1
975 TABLET ORAL ONCE
Status: DISCONTINUED | OUTPATIENT
Start: 2023-04-27 | End: 2023-04-27 | Stop reason: HOSPADM

## 2023-04-27 RX ORDER — SODIUM CHLORIDE, SODIUM LACTATE, POTASSIUM CHLORIDE, CALCIUM CHLORIDE 600; 310; 30; 20 MG/100ML; MG/100ML; MG/100ML; MG/100ML
INJECTION, SOLUTION INTRAVENOUS CONTINUOUS
Status: DISCONTINUED | OUTPATIENT
Start: 2023-04-27 | End: 2023-04-27 | Stop reason: HOSPADM

## 2023-04-27 RX ORDER — ONDANSETRON 2 MG/ML
4 INJECTION INTRAMUSCULAR; INTRAVENOUS EVERY 30 MIN PRN
Status: DISCONTINUED | OUTPATIENT
Start: 2023-04-27 | End: 2023-04-27 | Stop reason: HOSPADM

## 2023-04-27 RX ORDER — FENTANYL CITRATE 50 UG/ML
50 INJECTION, SOLUTION INTRAMUSCULAR; INTRAVENOUS EVERY 5 MIN PRN
Status: DISCONTINUED | OUTPATIENT
Start: 2023-04-27 | End: 2023-04-27 | Stop reason: HOSPADM

## 2023-04-27 RX ORDER — EPHEDRINE SULFATE 50 MG/ML
INJECTION, SOLUTION INTRAMUSCULAR; INTRAVENOUS; SUBCUTANEOUS PRN
Status: DISCONTINUED | OUTPATIENT
Start: 2023-04-27 | End: 2023-04-27

## 2023-04-27 RX ORDER — BUPIVACAINE HYDROCHLORIDE 2.5 MG/ML
INJECTION, SOLUTION EPIDURAL; INFILTRATION; INTRACAUDAL PRN
Status: DISCONTINUED | OUTPATIENT
Start: 2023-04-27 | End: 2023-04-27 | Stop reason: HOSPADM

## 2023-04-27 RX ORDER — OXYCODONE HYDROCHLORIDE 5 MG/1
5 TABLET ORAL ONCE
Status: DISCONTINUED | OUTPATIENT
Start: 2023-04-27 | End: 2023-04-27 | Stop reason: DRUGHIGH

## 2023-04-27 RX ORDER — CEFAZOLIN SODIUM/WATER 2 G/20 ML
2 SYRINGE (ML) INTRAVENOUS
Status: COMPLETED | OUTPATIENT
Start: 2023-04-27 | End: 2023-04-27

## 2023-04-27 RX ORDER — ACETAMINOPHEN 325 MG/1
975 TABLET ORAL ONCE
Status: COMPLETED | OUTPATIENT
Start: 2023-04-27 | End: 2023-04-27

## 2023-04-27 RX ORDER — FENTANYL CITRATE 50 UG/ML
INJECTION, SOLUTION INTRAMUSCULAR; INTRAVENOUS PRN
Status: DISCONTINUED | OUTPATIENT
Start: 2023-04-27 | End: 2023-04-27

## 2023-04-27 RX ORDER — LIDOCAINE HYDROCHLORIDE 10 MG/ML
INJECTION, SOLUTION INFILTRATION; PERINEURAL PRN
Status: DISCONTINUED | OUTPATIENT
Start: 2023-04-27 | End: 2023-04-27

## 2023-04-27 RX ORDER — HYDROMORPHONE HYDROCHLORIDE 1 MG/ML
0.4 INJECTION, SOLUTION INTRAMUSCULAR; INTRAVENOUS; SUBCUTANEOUS EVERY 5 MIN PRN
Status: DISCONTINUED | OUTPATIENT
Start: 2023-04-27 | End: 2023-04-27 | Stop reason: HOSPADM

## 2023-04-27 RX ORDER — ONDANSETRON 2 MG/ML
INJECTION INTRAMUSCULAR; INTRAVENOUS PRN
Status: DISCONTINUED | OUTPATIENT
Start: 2023-04-27 | End: 2023-04-27

## 2023-04-27 RX ORDER — HYDROMORPHONE HYDROCHLORIDE 1 MG/ML
0.2 INJECTION, SOLUTION INTRAMUSCULAR; INTRAVENOUS; SUBCUTANEOUS EVERY 5 MIN PRN
Status: CANCELLED | OUTPATIENT
Start: 2023-04-27

## 2023-04-27 RX ORDER — ONDANSETRON 4 MG/1
4 TABLET, ORALLY DISINTEGRATING ORAL EVERY 30 MIN PRN
Status: DISCONTINUED | OUTPATIENT
Start: 2023-04-27 | End: 2023-04-27 | Stop reason: HOSPADM

## 2023-04-27 RX ORDER — HYDROMORPHONE HYDROCHLORIDE 1 MG/ML
0.4 INJECTION, SOLUTION INTRAMUSCULAR; INTRAVENOUS; SUBCUTANEOUS EVERY 5 MIN PRN
Status: CANCELLED | OUTPATIENT
Start: 2023-04-27

## 2023-04-27 RX ORDER — CEFAZOLIN SODIUM/WATER 2 G/20 ML
2 SYRINGE (ML) INTRAVENOUS SEE ADMIN INSTRUCTIONS
Status: DISCONTINUED | OUTPATIENT
Start: 2023-04-27 | End: 2023-04-27 | Stop reason: HOSPADM

## 2023-04-27 RX ORDER — HYDROMORPHONE HYDROCHLORIDE 1 MG/ML
0.2 INJECTION, SOLUTION INTRAMUSCULAR; INTRAVENOUS; SUBCUTANEOUS EVERY 5 MIN PRN
Status: DISCONTINUED | OUTPATIENT
Start: 2023-04-27 | End: 2023-04-27 | Stop reason: HOSPADM

## 2023-04-27 RX ORDER — ONDANSETRON 4 MG/1
4 TABLET, ORALLY DISINTEGRATING ORAL EVERY 30 MIN PRN
Status: CANCELLED | OUTPATIENT
Start: 2023-04-27

## 2023-04-27 RX ORDER — PROPOFOL 10 MG/ML
INJECTION, EMULSION INTRAVENOUS PRN
Status: DISCONTINUED | OUTPATIENT
Start: 2023-04-27 | End: 2023-04-27

## 2023-04-27 RX ORDER — OXYCODONE HYDROCHLORIDE 5 MG/1
10 TABLET ORAL ONCE
Status: COMPLETED | OUTPATIENT
Start: 2023-04-27 | End: 2023-04-27

## 2023-04-27 RX ORDER — FENTANYL CITRATE 50 UG/ML
25 INJECTION, SOLUTION INTRAMUSCULAR; INTRAVENOUS EVERY 5 MIN PRN
Status: CANCELLED | OUTPATIENT
Start: 2023-04-27

## 2023-04-27 RX ORDER — DEXAMETHASONE SODIUM PHOSPHATE 10 MG/ML
INJECTION, SOLUTION INTRAMUSCULAR; INTRAVENOUS PRN
Status: DISCONTINUED | OUTPATIENT
Start: 2023-04-27 | End: 2023-04-27

## 2023-04-27 RX ORDER — SODIUM CHLORIDE, SODIUM LACTATE, POTASSIUM CHLORIDE, CALCIUM CHLORIDE 600; 310; 30; 20 MG/100ML; MG/100ML; MG/100ML; MG/100ML
INJECTION, SOLUTION INTRAVENOUS CONTINUOUS
Status: CANCELLED | OUTPATIENT
Start: 2023-04-27

## 2023-04-27 RX ADMIN — PROPOFOL 10 MG: 10 INJECTION, EMULSION INTRAVENOUS at 13:26

## 2023-04-27 RX ADMIN — KETAMINE HYDROCHLORIDE 10 MG: 10 INJECTION, SOLUTION INTRAMUSCULAR; INTRAVENOUS at 13:20

## 2023-04-27 RX ADMIN — Medication 2 G: at 12:32

## 2023-04-27 RX ADMIN — ACETAMINOPHEN 975 MG: 325 TABLET ORAL at 09:53

## 2023-04-27 RX ADMIN — KETAMINE HYDROCHLORIDE 30 MG: 10 INJECTION, SOLUTION INTRAMUSCULAR; INTRAVENOUS at 12:13

## 2023-04-27 RX ADMIN — ONDANSETRON 4 MG: 2 INJECTION INTRAMUSCULAR; INTRAVENOUS at 13:20

## 2023-04-27 RX ADMIN — FENTANYL CITRATE 50 MCG: 50 INJECTION, SOLUTION INTRAMUSCULAR; INTRAVENOUS at 14:41

## 2023-04-27 RX ADMIN — DEXAMETHASONE SODIUM PHOSPHATE 10 MG: 10 INJECTION, SOLUTION INTRAMUSCULAR; INTRAVENOUS at 12:13

## 2023-04-27 RX ADMIN — LIDOCAINE HYDROCHLORIDE 50 MG: 10 INJECTION, SOLUTION INFILTRATION; PERINEURAL at 12:13

## 2023-04-27 RX ADMIN — PROPOFOL 50 MG: 10 INJECTION, EMULSION INTRAVENOUS at 12:18

## 2023-04-27 RX ADMIN — OXYCODONE HYDROCHLORIDE 10 MG: 5 TABLET ORAL at 16:17

## 2023-04-27 RX ADMIN — FENTANYL CITRATE 50 MCG: 50 INJECTION, SOLUTION INTRAMUSCULAR; INTRAVENOUS at 12:13

## 2023-04-27 RX ADMIN — SUGAMMADEX 370 MG: 100 INJECTION, SOLUTION INTRAVENOUS at 14:17

## 2023-04-27 RX ADMIN — ROCURONIUM BROMIDE 50 MG: 50 INJECTION, SOLUTION INTRAVENOUS at 12:13

## 2023-04-27 RX ADMIN — ROCURONIUM BROMIDE 30 MG: 50 INJECTION, SOLUTION INTRAVENOUS at 13:57

## 2023-04-27 RX ADMIN — HYDROMORPHONE HYDROCHLORIDE 0.4 MG: 1 INJECTION, SOLUTION INTRAMUSCULAR; INTRAVENOUS; SUBCUTANEOUS at 15:07

## 2023-04-27 RX ADMIN — SODIUM CHLORIDE, POTASSIUM CHLORIDE, SODIUM LACTATE AND CALCIUM CHLORIDE 100 ML/HR: 600; 310; 30; 20 INJECTION, SOLUTION INTRAVENOUS at 10:13

## 2023-04-27 RX ADMIN — Medication 10 MG: at 13:12

## 2023-04-27 RX ADMIN — KETAMINE HYDROCHLORIDE 10 MG: 10 INJECTION, SOLUTION INTRAMUSCULAR; INTRAVENOUS at 13:27

## 2023-04-27 RX ADMIN — HYDROMORPHONE HYDROCHLORIDE 0.4 MG: 1 INJECTION, SOLUTION INTRAMUSCULAR; INTRAVENOUS; SUBCUTANEOUS at 15:20

## 2023-04-27 RX ADMIN — HYDROMORPHONE HYDROCHLORIDE 0.4 MG: 1 INJECTION, SOLUTION INTRAMUSCULAR; INTRAVENOUS; SUBCUTANEOUS at 14:55

## 2023-04-27 RX ADMIN — PROPOFOL 140 MG: 10 INJECTION, EMULSION INTRAVENOUS at 12:13

## 2023-04-27 RX ADMIN — Medication 10 MG: at 12:25

## 2023-04-27 RX ADMIN — FENTANYL CITRATE 50 MCG: 50 INJECTION, SOLUTION INTRAMUSCULAR; INTRAVENOUS at 13:26

## 2023-04-27 RX ADMIN — FENTANYL CITRATE 50 MCG: 50 INJECTION, SOLUTION INTRAMUSCULAR; INTRAVENOUS at 14:47

## 2023-04-27 RX ADMIN — ROCURONIUM BROMIDE 10 MG: 50 INJECTION, SOLUTION INTRAVENOUS at 13:26

## 2023-04-27 RX ADMIN — MIDAZOLAM 2 MG: 1 INJECTION INTRAMUSCULAR; INTRAVENOUS at 12:03

## 2023-04-27 RX ADMIN — SODIUM CHLORIDE, POTASSIUM CHLORIDE, SODIUM LACTATE AND CALCIUM CHLORIDE: 600; 310; 30; 20 INJECTION, SOLUTION INTRAVENOUS at 13:21

## 2023-04-27 ASSESSMENT — ENCOUNTER SYMPTOMS
DYSRHYTHMIAS: 0
SEIZURES: 0

## 2023-04-27 ASSESSMENT — ACTIVITIES OF DAILY LIVING (ADL)
ADLS_ACUITY_SCORE: 18

## 2023-04-27 ASSESSMENT — COPD QUESTIONNAIRES: COPD: 0

## 2023-04-27 ASSESSMENT — LIFESTYLE VARIABLES: TOBACCO_USE: 0

## 2023-04-27 NOTE — INTERVAL H&P NOTE
"I have reviewed the surgical (or preoperative) H&P that is linked to this encounter, and examined the patient. There are no significant changes.        Jareth Julian MD  General Surgery [phone]  Vascular Surgery [phone]          Clinical Conditions Present on Arrival:  Clinically Significant Risk Factors Present on Admission                  # Overweight: Estimated body mass index is 27.84 kg/m  as calculated from the following:    Height as of this encounter: 1.803 m (5' 10.98\").    Weight as of this encounter: 90.5 kg (199 lb 8.3 oz).       "

## 2023-04-27 NOTE — ANESTHESIA PREPROCEDURE EVALUATION
"Anesthesia Pre-Procedure Evaluation    Patient: Kai Jim   MRN: [id number] : 1960        Procedure : Procedure(s):  HERNIORRHAPHY, VENTRAL, ROBOT-ASSISTED, LAPAROSCOPIC, USING DA OBEY XI  HERNIORRHAPHY, INGUINAL, ROBOT-ASSISTED, LAPAROSCOPIC, USING DA OBEY XI          Past Medical History:   Diagnosis Date     BPH (benign prostatic hyperplasia)      ED (erectile dysfunction)      Family history of kidney stones      Gastroesophageal reflux disease      Hyperlipidemia      Hypertension      Kidney mass      Kidney stone      Knee pain     right- \"bone on bone\"     Motion sickness      Psoriatic arthritis (H) 2016     RA (rheumatoid arthritis) (H)     psoriatic     Seasonal allergies      Sinusitis      Squamous cell carcinoma of skin       Past Surgical History:   Procedure Laterality Date     CARPAL TUNNEL RELEASE RT/LT Bilateral      DAVINCI NEPHRECTOMY Right 2022    Procedure: NEPHRECTOMY, RADICAL, ROBOT-ASSISTED LAPAROSCOPIC;  Surgeon: Brad Luciano MD;  Location: [facility]     finger amputation surgery Right     Due to log splitter. x2 fingers     HERNIA REPAIR  1991    Right inguinal     NOSE SURGERY       [place] LAP,INGUINAL HERNIA REPR,INITIAL Left 2020    Procedure: HERNIORRHAPHY, INGUINAL, LAPAROSCOPIC;  Surgeon: Jareth Julian MD;  [facility]: [facility];  Service: General     VARICOCELE EXCISION        No Known Allergies   Social History     Tobacco Use     Smoking status: Former     Types: Cigars     Quit date: 3/12/2019     Years since quittin.1     Smokeless tobacco: Never   Vaping Use     Vaping status: Not on file   Substance Use Topics     Alcohol use: Yes     Comment: occasional      Wt Readings from Last 1 Encounters:   23 93 kg (205 lb)        Anesthesia Evaluation   Pt has had prior anesthetic.     No history of anesthetic complications       ROS/MED HX  ENT/Pulmonary:    (-) tobacco use, asthma, COPD, sleep apnea, BING risk factors " and recent URI   Neurologic:    (-) no seizures and no CVA   Cardiovascular:     (+) Dyslipidemia hypertension----- (-) taking anticoagulants/antiplatelets, syncope and arrhythmias   METS/Exercise Tolerance: >4 METS    Hematologic:    (-) anemia   Musculoskeletal: Comment: psoriatic arthritis, osteoarthritis, psoriasis   (+) arthritis,     GI/Hepatic:     (+) GERD, Asymptomatic on medication,     Renal/Genitourinary: Comment: S/p nephrectomy      (+) renal disease, type: CRI,     Endo:    (-) Type I DM, Type II DM, thyroid disease and obesity   Psychiatric/Substance Use:     (+) psychiatric history depression  (-) chronic opioid use history   Infectious Disease:    (-) Recent Fever   Malignancy:       Other:            Physical Exam    Airway        Mallampati: II   TM distance: > 3 FB   Neck ROM: full   Mouth opening: > 3 cm    Respiratory Devices and Support         Dental     Comment: Cosmetic upper and lower        Cardiovascular          Rhythm and rate: regular and normal     Pulmonary   pulmonary exam normal                OUTSIDE LABS:  CBC:   Lab Results   Component Value Date    WBC 6.3 04/17/2023    WBC 5.8 01/16/2023    HGB 13.7 04/17/2023    HGB 12.8 (L) 01/16/2023    HCT 41.6 04/17/2023    HCT 38.4 (L) 01/16/2023     04/17/2023     01/16/2023     BMP:   Lab Results   Component Value Date     04/06/2023     11/25/2022    POTASSIUM 4.4 04/06/2023    POTASSIUM 4.6 11/25/2022    CHLORIDE 104 04/06/2023    CHLORIDE 103 11/25/2022    CO2 24 04/06/2023    CO2 25 11/25/2022    BUN 20.0 04/06/2023    BUN 16.8 11/25/2022    CR 1.27 (H) 04/17/2023    CR 1.28 (H) 04/06/2023    GLC 88 04/06/2023    GLC 89 11/25/2022     COAGS: No results found for: PTT, INR, FIBR  POC: No results found for: BGM, HCG, HCGS  HEPATIC:   Lab Results   Component Value Date    ALBUMIN 4.3 04/17/2023    ALT 17 04/17/2023     OTHER:   Lab Results   Component Value Date    A1C 5.4 12/13/2018    AMARI 9.4 04/06/2023     CRP 0.7 04/17/2018    SED 15 04/17/2018       Anesthesia Plan    ASA Status:  2   NPO Status:  NPO Appropriate    Anesthesia Type: General.     - Airway: ETT              Consents    Anesthesia Plan(s) and associated risks, benefits, and realistic alternatives discussed. Questions answered and patient/representative(s) expressed understanding.    - Discussed:     - Discussed with:  Patient, Spouse      - Extended Intubation/Ventilatory Support Discussed: No.      - Patient is DNR/DNI Status: No    Use of blood products discussed: No .     Postoperative Care    Pain management: IV analgesics, Multi-modal analgesia.   PONV prophylaxis: Ondansetron (or other 5HT-3), Dexamethasone or Solumedrol     Comments:    Other Comments:   GETA  2 PIV  Acetaminophen 1 g (pre-op), ketamine on induction  Dexamethasone, ondansetron PONV ppx             Renetta Flowers MD

## 2023-04-27 NOTE — OR NURSING
Pre-op complete, pt. Ready for surgery.  Pt. Offered restroom and has call light in place.  Pt's wife Abril is at bedside.

## 2023-04-27 NOTE — ANESTHESIA PROCEDURE NOTES
Airway       Patient location during procedure: OR       Procedure Start/Stop Times: 4/27/2023 12:20 PM  Staff -        Anesthesiologist:  Renetta Flowers MD       CRNA: Alex Irvin APRN CRNA       Performed By: anesthesiologist  Consent for Airway        Urgency: elective  Indications and Patient Condition       Indications for airway management: heidi-procedural       Induction type:intravenous       Mask difficulty assessment: 2 - vent by mask + OA or adjuvant +/- NMBA    Final Airway Details       Final airway type: endotracheal airway       Successful airway: ETT - single  Endotracheal Airway Details        ETT size (mm): 7.5       Cuffed: yes       Successful intubation technique: direct laryngoscopy       DL Blade Type: Taveras 2       Grade View of Cords: 2       Adjucts: stylet       Position: Right       Measured from: lips       Secured at (cm): 24       Bite block used: None    Post intubation assessment        Placement verified by: capnometry, equal breath sounds and chest rise        Number of attempts at approach: 2       Number of other approaches attempted: 0       Secured with: silk tape       Ease of procedure: easy       Dentition: Lips/oral mucosa injury, Intact and Unchanged (Lip intact after CRNA attempt. Slight laceration to lower lip after anesthesiologist attempt.)       Dental guard used and removed.    Medication(s) Administered   Medication Administration Time: 4/27/2023 12:20 PM    Additional Comments       Challenging DL. CRNA unsuccessful x1 (grade 3 view). Anesthesiologist successful but challenging attempt (grade 2b view). Very limited neck extension. Suggest Glidescope in the future.

## 2023-04-27 NOTE — ANESTHESIA POSTPROCEDURE EVALUATION
Patient: Kai Jim    Procedure: Procedure(s):  HERNIORRHAPHY, VENTRAL, ROBOT-ASSISTED, LAPAROSCOPIC, USING DA OBEY XI x 2  HERNIORRHAPHY, INGUINAL, ROBOT-ASSISTED, LAPAROSCOPIC, USING DA OBEY XI  LYSIS, ADHESIONS, ROBOT-ASSISTED, LAPAROSCOPIC, USING DA OBEY XI       Anesthesia Type:  General    Note:  Disposition: Outpatient   Postop Pain Control: Uneventful            Sign Out: Well controlled pain   PONV: No   Neuro/Psych: Uneventful            Sign Out: Acceptable/Baseline neuro status   Airway/Respiratory: Uneventful            Sign Out: Acceptable/Baseline resp. status   CV/Hemodynamics: Uneventful            Sign Out: Acceptable CV status; No obvious hypovolemia; No obvious fluid overload   Other NRE: NONE   DID A NON-ROUTINE EVENT OCCUR? No           Last vitals:  Vitals Value Taken Time   /84 04/27/23 1515   Temp 36.5  C (97.7  F) 04/27/23 1432   Pulse 76 04/27/23 1521   Resp 14 04/27/23 1521   SpO2 97 % 04/27/23 1521   Vitals shown include unvalidated device data.    Electronically Signed By: Renetta Flowers MD  April 27, 2023  3:23 PM

## 2023-04-27 NOTE — OP NOTE
[facility]  Operative Note    Pre-operative diagnosis: Incisional hernia, without obstruction or gangrene [K43.2] x 2  Non-recurrent unilateral inguinal hernia without obstruction or gangrene [K40.90]   Post-operative diagnosis right inguinal hernia, ventral hernia x 2   Procedure: Procedure(s):  HERNIORRHAPHY, VENTRAL, ROBOT-ASSISTED, LAPAROSCOPIC, USING DA OBEY XI x 2  HERNIORRHAPHY, INGUINAL, ROBOT-ASSISTED, LAPAROSCOPIC, USING DA OBEY XI  LYSIS, ADHESIONS, ROBOT-ASSISTED, LAPAROSCOPIC, USING DA OBEY XI   Surgeon: Jareth Julian MD   Assistants(s): none   Anesthesia: General    Estimated blood loss: 15 ml    Total IV fluids: (See anesthesia record)   Blood transfusion: No transfusion was given during surgery   Total urine output: (See anesthesia record)   Drains: None   Specimens: None   Implants: 10 x15 progrip mesh right inguinal  10x15 mesh midline coated      Findings:   Patient had a hold hernia pair on the left side with adhesions around this area but mesh is intact in the preperitoneal space.  Definitely had a indirect and direct right inguinal hernia this was repaired robotically with mesh in this preperitoneal space ProGrip.  Patient had 2 ventral hernia along superior to the umbilicus one was about 3.5 cm in diameter and one was about  2 cm in diameter.    Complications: None.   Condition: Stable               Description of procedure:      Consent was obtained.  Taken the operative placed in supine position general endotracheal anesthesia was administered anesthesia staff.  Rust is placed in the office prepped and draped sterile manner.  A briefing timeout was performed by anesthesia and our staff.  We then put him in Trendelenburg position and I gained access to the peritoneal cavity superior to the umbilicus about 4 to 5 cm above the hernias.  This is with a 5 mm Visiport scope.  Insufflate the space to pressure 15 mils mercury CO2 upon doing so I placed an 8 mm  "robotic trocar just lateral to this to the right and to the left.  Changed my 5 Taveras port to a robotic trocar and and we docked the robot.  We began by looking at the groins the left groin there was adhesions in this area and could not visualize things well we took this down with dissection sharply and exposed that there is a preperitoneal laparoscopic repair at some time point in the space.  This is intact there is no defects and no recurrence.  Switched my attention over to the right side which definitely has a indirect direct component.  I came across the from the midline out lateral the peritoneum and fascia.  Into the preperitoneal space and took this down with dissection down to the pubic tubercle medially to the cord and vascular structures in the mid axillary line and then all the way out lateral.  This is done with both sharp and blunt dissection using electrocautery prior scarring from the prior repair was in evidence and so this is a significant amount of dissection to be done.  Once exposed landmarks the pubic tubercle Enrico's ligament and then I reduced the hernia sacs with both the indirect and direct.  I made sure good lateral dissection we placed a 10 x 15 cm ProGrip mesh intra-abdominally and this was put in the position covering over the start at the midline pubic tubercle Enrico's ligament the cord vascular structures and lateral nicely and lied flat here we made sure that all the lipoma\" and hernia sacs were reduced and superior or in this case would be posterior to the mesh and would not get involved in the hernia again.  Once is complete echo I closed this defect which had been created anteriorly with to get to the space using a 2-0 absorbable V-Loc suture.  Upon completion of this I then undocked the robot and remove the trocars except for the left upper abdomen 1.  I then changed trocars to the mid abdomen on the left side in the lower abdomen left side this is under direct visitation with " scope.  Then redocked the robot.  At this time point we could see easily these midline defects and there were significant but not too large.  And this is probably from his extraction site from his prior robotic surgery.  I elected at this time point to repair this primarily with an oh permanent V-Loc suture and I started superior to this about 4 cm approximating the fascia with the liver linea alba and then approximating fascia through the defect incorporating the hernia sac in the repair of this midline defect.  I did close this across actually the umbilicus which is a little weak but was not a good herniated at this time.  I then at this time point had a 10 x 5 cm do layered mesh placed in an abdominal space along with 2 absorbable 2 OV lock sutures.  I tacked this up with my original first suture needle to the abdominal wall and then sutured this in on the periphery in a continuous fashion using the robot.  This was completed along the entire diameter of the mesh.  Lied nicely in the space and removed all the needles.  All sponge needle counts were correct remove the trocar CO2 and the gas was happy to repair closed incision with a 4 Monocryl subcuticular stitch.  Steri-Strip sterile dressings were applied and patient was extubated transferred PACU in stable condition with abdominal binder.  To be transferred home in care of his family number.

## 2023-04-27 NOTE — OR NURSING
Pt. Informed of approx. 35 minute delay in surgery start time.  Pt. Has call light in reach, wife is in room.

## 2023-04-27 NOTE — ANESTHESIA CARE TRANSFER NOTE
Patient: Kai Jim    Procedure: Procedure(s):  HERNIORRHAPHY, VENTRAL, ROBOT-ASSISTED, LAPAROSCOPIC, USING DA OBEY XI x 2  HERNIORRHAPHY, INGUINAL, ROBOT-ASSISTED, LAPAROSCOPIC, USING DA OBEY XI  LYSIS, ADHESIONS, ROBOT-ASSISTED, LAPAROSCOPIC, USING DA OBEY XI       Diagnosis: Incisional hernia, without obstruction or gangrene [K43.2]  Non-recurrent unilateral inguinal hernia without obstruction or gangrene [K40.90]  Diagnosis Additional Information: No value filed.    Anesthesia Type:   General     Note:    Oropharynx: oropharynx clear of all foreign objects  Level of Consciousness: awake  Oxygen Supplementation: room air    Independent Airway: airway patency satisfactory and stable  Dentition: dentition unchanged  Vital Signs Stable: post-procedure vital signs reviewed and stable  Report to RN Given: handoff report given  Patient transferred to: PACU    Handoff Report: Identifed the Patient, Identified the Reponsible Provider, Reviewed the pertinent medical history, Discussed the surgical course, Reviewed Intra-OP anesthesia mangement and issues during anesthesia, Set expectations for post-procedure period and Allowed opportunity for questions and acknowledgement of understanding      Vitals:  Vitals Value Taken Time   /77 04/27/23 1433   Temp 36.5  C (97.7  F) 04/27/23 1432   Pulse 77 04/27/23 1433   Resp 22 04/27/23 1433   SpO2 97 % 04/27/23 1433   Vitals shown include unvalidated device data.    Electronically Signed By: JAYCOB Real CRNA  April 27, 2023  2:35 PM

## 2023-05-05 ENCOUNTER — TELEPHONE (OUTPATIENT)
Dept: VASCULAR SURGERY | Facility: CLINIC | Age: 63
End: 2023-05-05
Payer: COMMERCIAL

## 2023-05-05 DIAGNOSIS — K43.2 INCISIONAL HERNIA, WITHOUT OBSTRUCTION OR GANGRENE: Primary | ICD-10-CM

## 2023-05-05 RX ORDER — OXYCODONE AND ACETAMINOPHEN 5; 325 MG/1; MG/1
1 TABLET ORAL EVERY 6 HOURS PRN
Qty: 12 TABLET | Refills: 0 | Status: SHIPPED | OUTPATIENT
Start: 2023-05-05 | End: 2023-05-08

## 2023-05-05 NOTE — TELEPHONE ENCOUNTER
I spoke to Nir. He states that he is still having incisional pain especially at night when he moves around when he is sleeping. He had a ventral hernia repair, right inguinal inguinal hernia reapir and lysis of adhesions 4/27. Incisions are healing up well. No fevers or chills. He is unable to take ibuprofen and is requesting a refill of Oxycodone. Will check with Dr. Julian.       [facility]      Rowena Chatterjee RN  [facility]  General Surgery  [address] [place] [id number]  [email or web address]   Office:[phone]  Employed by [facility] [employer],

## 2023-05-05 NOTE — TELEPHONE ENCOUNTER
Patient calling, had hernia surgery by SHELBIE 04/27 and states he has run out of his percocet and would like to speak with a nurse regarding this. Please call patient.    Thank you

## 2023-05-12 PROBLEM — E78.5 HYPERLIPIDEMIA: Status: ACTIVE | Noted: 2023-05-12

## 2023-05-12 PROBLEM — Z90.5 S/P NEPHRECTOMY: Status: ACTIVE | Noted: 2023-05-12

## 2023-05-12 PROBLEM — E78.00 PURE HYPERCHOLESTEROLEMIA: Status: ACTIVE | Noted: 2023-05-12

## 2023-05-12 PROBLEM — M25.561 CHRONIC PAIN OF RIGHT KNEE: Status: ACTIVE | Noted: 2017-03-16

## 2023-05-12 PROBLEM — M06.9 RHEUMATOID ARTHRITIS (H): Status: ACTIVE | Noted: 2023-05-12

## 2023-05-12 PROBLEM — M46.1 DEGENERATIVE JOINT DISEASE OF SACROILIAC JOINT (H): Status: ACTIVE | Noted: 2018-04-17

## 2023-05-12 PROBLEM — R97.20 RAISED PROSTATE SPECIFIC ANTIGEN: Status: ACTIVE | Noted: 2019-02-26

## 2023-05-12 PROBLEM — N52.9 ED (ERECTILE DYSFUNCTION) OF ORGANIC ORIGIN: Status: ACTIVE | Noted: 2023-05-12

## 2023-05-12 PROBLEM — K40.90 LEFT INGUINAL HERNIA: Status: ACTIVE | Noted: 2020-11-13

## 2023-05-12 PROBLEM — Z91.09 ENVIRONMENTAL ALLERGIES: Status: ACTIVE | Noted: 2023-05-12

## 2023-05-12 PROBLEM — N40.1 BENIGN LOCALIZED PROSTATIC HYPERPLASIA WITH LOWER URINARY TRACT SYMPTOMS (LUTS): Status: ACTIVE | Noted: 2023-05-12

## 2023-05-12 PROBLEM — R35.0 INCREASED FREQUENCY OF URINATION: Status: ACTIVE | Noted: 2019-01-17

## 2023-05-12 PROBLEM — M17.11 PRIMARY OSTEOARTHRITIS OF RIGHT KNEE: Status: ACTIVE | Noted: 2023-05-12

## 2023-05-12 PROBLEM — J32.9 CHRONIC SINUSITIS, UNSPECIFIED: Status: ACTIVE | Noted: 2023-05-12

## 2023-05-12 PROBLEM — N20.0 CALCULUS OF KIDNEY: Status: ACTIVE | Noted: 2023-05-12

## 2023-05-12 PROBLEM — M15.0 PRIMARY OSTEOARTHRITIS INVOLVING MULTIPLE JOINTS: Status: ACTIVE | Noted: 2018-04-17

## 2023-05-12 PROBLEM — G89.29 CHRONIC PAIN OF RIGHT KNEE: Status: ACTIVE | Noted: 2017-03-16

## 2023-05-12 PROBLEM — Z82.69 FH: TOTAL KNEE REPLACEMENT: Status: ACTIVE | Noted: 2022-12-19

## 2023-05-12 PROBLEM — L40.9 PSORIASIS: Status: ACTIVE | Noted: 2020-02-13

## 2023-05-15 ENCOUNTER — OFFICE VISIT (OUTPATIENT)
Dept: SURGERY | Facility: CLINIC | Age: 63
End: 2023-05-15
Payer: COMMERCIAL

## 2023-05-15 VITALS — SYSTOLIC BLOOD PRESSURE: 120 MMHG | DIASTOLIC BLOOD PRESSURE: 68 MMHG

## 2023-05-15 DIAGNOSIS — K43.2 INCISIONAL HERNIA, WITHOUT OBSTRUCTION OR GANGRENE: Primary | ICD-10-CM

## 2023-05-15 PROCEDURE — 99024 POSTOP FOLLOW-UP VISIT: CPT | Performed by: SPECIALIST

## 2023-05-15 RX ORDER — OXYCODONE AND ACETAMINOPHEN 5; 325 MG/1; MG/1
1 TABLET ORAL EVERY 6 HOURS PRN
Qty: 12 TABLET | Refills: 0 | Status: SHIPPED | OUTPATIENT
Start: 2023-05-15 | End: 2023-05-18

## 2023-05-15 NOTE — LETTER
5/15/2023         RE: Kai Jim  [address] [place] [address]        Dear Colleague,    Thank you for referring your patient, Kai Jim, to the [facility] [place]. Please see a copy of my visit note below.    HPI: Pt is here for follow up of a robotic inguinal and ventral hernia repair.  He is doing well. His appetite is good, and bowel function regular.  No fevers or chills. Ambulating without problems.  Having some pain when he stands too long.  Otherwise though things are improving daily.      /68 (BP Location: Right arm)       EXAM: This is a  62 year old MAN in no distress  GENERAL: Appears well  CHEST clear  CVS S1S2 NSR  ABDOMEN: Soft, non-tender.   EXT: warm, moves without difficulty         Assessment/Plan:   Status post robotic ventral and inguinal hernia repair    Given 1 last Macie for pain meds  He is going to be out of work for 4 weeks reassess next week but think he should build get back to it    No follow-ups on file.      Jareth Julian MD ,MD  [facility] Department of Surgery      Again, thank you for allowing me to participate in the care of your patient.        Sincerely,        Jareth Julian MD

## 2023-05-15 NOTE — PROGRESS NOTES
HPI: Pt is here for follow up of a robotic inguinal and ventral hernia repair.  He is doing well. His appetite is good, and bowel function regular.  No fevers or chills. Ambulating without problems.  Having some pain when he stands too long.  Otherwise though things are improving daily.      /68 (BP Location: Right arm)       EXAM: This is a  62 year old MAN in no distress  GENERAL: Appears well  CHEST clear  CVS S1S2 NSR  ABDOMEN: Soft, non-tender.   EXT: warm, moves without difficulty         Assessment/Plan:   Status post robotic ventral and inguinal hernia repair    Given 1 last Macie for pain meds  He is going to be out of work for 4 weeks reassess next week but think he should build get back to it    No follow-ups on file.      Jareth Julian MD ,MD  [facility] Department of Surgery

## 2023-06-08 ENCOUNTER — TRANSFERRED RECORDS (OUTPATIENT)
Dept: HEALTH INFORMATION MANAGEMENT | Facility: CLINIC | Age: 63
End: 2023-06-08

## 2023-07-16 DIAGNOSIS — L40.9 PSORIASIS: ICD-10-CM

## 2023-07-16 DIAGNOSIS — L40.50 PSORIATIC ARTHRITIS (H): ICD-10-CM

## 2023-07-17 RX ORDER — LEFLUNOMIDE 20 MG/1
TABLET ORAL
Qty: 30 TABLET | Refills: 0 | Status: SHIPPED | OUTPATIENT
Start: 2023-07-17 | End: 2023-08-15

## 2023-08-10 ENCOUNTER — LAB (OUTPATIENT)
Dept: LAB | Facility: CLINIC | Age: 63
End: 2023-08-10
Payer: COMMERCIAL

## 2023-08-10 DIAGNOSIS — L40.50 PSORIATIC ARTHRITIS (H): ICD-10-CM

## 2023-08-10 LAB
ALBUMIN SERPL BCG-MCNC: 4.1 G/DL (ref 3.5–5.2)
ALT SERPL W P-5'-P-CCNC: 14 U/L (ref 0–70)
CREAT SERPL-MCNC: 1.3 MG/DL (ref 0.67–1.17)
ERYTHROCYTE [DISTWIDTH] IN BLOOD BY AUTOMATED COUNT: 13.9 % (ref 10–15)
GFR SERPL CREATININE-BSD FRML MDRD: 62 ML/MIN/1.73M2
HCT VFR BLD AUTO: 40.2 % (ref 40–53)
HGB BLD-MCNC: 13.5 G/DL (ref 13.3–17.7)
MCH RBC QN AUTO: 29.7 PG (ref 26.5–33)
MCHC RBC AUTO-ENTMCNC: 33.6 G/DL (ref 31.5–36.5)
MCV RBC AUTO: 89 FL (ref 78–100)
PLATELET # BLD AUTO: 256 10E3/UL (ref 150–450)
RBC # BLD AUTO: 4.54 10E6/UL (ref 4.4–5.9)
WBC # BLD AUTO: 7 10E3/UL (ref 4–11)

## 2023-08-10 PROCEDURE — 84460 ALANINE AMINO (ALT) (SGPT): CPT

## 2023-08-10 PROCEDURE — 36415 COLL VENOUS BLD VENIPUNCTURE: CPT

## 2023-08-10 PROCEDURE — 82565 ASSAY OF CREATININE: CPT

## 2023-08-10 PROCEDURE — 82040 ASSAY OF SERUM ALBUMIN: CPT

## 2023-08-10 PROCEDURE — 85027 COMPLETE CBC AUTOMATED: CPT

## 2023-08-12 ENCOUNTER — HEALTH MAINTENANCE LETTER (OUTPATIENT)
Age: 63
End: 2023-08-12

## 2023-08-15 ENCOUNTER — OFFICE VISIT (OUTPATIENT)
Dept: RHEUMATOLOGY | Facility: CLINIC | Age: 63
End: 2023-08-15
Payer: COMMERCIAL

## 2023-08-15 VITALS
DIASTOLIC BLOOD PRESSURE: 78 MMHG | SYSTOLIC BLOOD PRESSURE: 120 MMHG | BODY MASS INDEX: 28.93 KG/M2 | HEART RATE: 59 BPM | WEIGHT: 207.3 LBS | OXYGEN SATURATION: 97 %

## 2023-08-15 DIAGNOSIS — M15.0 PRIMARY OSTEOARTHRITIS INVOLVING MULTIPLE JOINTS: ICD-10-CM

## 2023-08-15 DIAGNOSIS — L40.50 PSORIATIC ARTHRITIS (H): Primary | ICD-10-CM

## 2023-08-15 DIAGNOSIS — Z90.5 S/P NEPHRECTOMY: ICD-10-CM

## 2023-08-15 DIAGNOSIS — Z79.899 HIGH RISK MEDICATION USE: ICD-10-CM

## 2023-08-15 DIAGNOSIS — L40.9 PSORIASIS: ICD-10-CM

## 2023-08-15 PROCEDURE — 99214 OFFICE O/P EST MOD 30 MIN: CPT | Performed by: INTERNAL MEDICINE

## 2023-08-15 RX ORDER — LEFLUNOMIDE 20 MG/1
20 TABLET ORAL DAILY
Qty: 90 TABLET | Refills: 0 | Status: SHIPPED | OUTPATIENT
Start: 2023-08-15 | End: 2023-10-12

## 2023-08-15 NOTE — PROGRESS NOTES
"      Rheumatology follow-up visit note     Kai is a 63 year old male presents today for follow-up.    Kai was seen today for recheck.    Diagnoses and all orders for this visit:    Psoriatic arthritis (H)  -     leflunomide (ARAVA) 20 MG tablet; Take 1 tablet (20 mg) by mouth daily for 90 days    Primary osteoarthritis involving multiple joints    High risk medication use    Psoriasis  -     leflunomide (ARAVA) 20 MG tablet; Take 1 tablet (20 mg) by mouth daily for 90 days    S/p nephrectomy        Well-controlled psoriatic arthritis, psoriasis on leflunomide that he has tolerated well continues to follow-up with neurology status post nephrectomy right side for clear cell carcinoma.  He was reassured.  We will stay the course.  We will meet here in 6 months.  Labs every 3 months.    Follow up in 6 months.    HPI    Kai Jim is a 63 year old male is here for follow-up of  psoriatic arthritis, osteoarthritis, psoriasis and has been doing well on leflunomide.  He is very happy with the decision to go ahead with a right knee arthroplasty after \"9 years of pain\" he is able to now walk much better go biking.  He has mild renal impairment.  He has noted overall pain level of 0.5/10.  Occasional discomfort in the right big toe.  He is able to do all his day-to-day activities without difficulty.  There is no morning stiffness noted.   He has noted no upper stiffness in the morning.  Recent labs reviewed within acceptable range minimal drop in kidney function.Used to be on Enbrel for several years before insurance dropped to the years ago.  Otezla was associated with gastrointestinal symptoms.  He has  family history of rheumatoid arthritis in father.   He is not a smoker alcohol occasionally.   He injured his right index and middle finger chopping wood many years ago.  That was in 1986.    He injured his left hand and a chainsaw.  He works in construction, repairing storm drains, pavement etc.  DETAILED " EXAMINATION  08/15/23  :    Vitals:    08/15/23 0952   BP: 120/78   Pulse: 59   SpO2: 97%   Weight: 94 kg (207 lb 4.8 oz)     Alert oriented. Head including the face is examined for malar rash, heliotropes, scarring, lupus pernio. Eyes examined for redness such as in episcleritis/scleritis, periorbital lesions.   Neck/ Face examined for parotid gland swelling, range of motion of neck.  Left upper and lower and right upper and lower extremities examined for tenderness, swelling, warmth of the appendicular joints, range of motion, edema, rash.  Some of the important findings included: he does not have evidence of synovitis in the palpable joints of the upper extremities.  No significant deformities of the digits.  no Heberden nodes.  Range of motion of the shoulders   show full abduction.  No JLT effusion or warmth of the knees.  he does not have dactylitis of the digits.  Right index and middle finger status post amputation secondary to trauma remote past.     Patient Active Problem List    Diagnosis Date Noted    Benign localized prostatic hyperplasia with lower urinary tract symptoms (LUTS) 05/12/2023     Priority: Medium    Calculus of kidney 05/12/2023     Priority: Medium    Chronic sinusitis, unspecified 05/12/2023     Priority: Medium    ED (erectile dysfunction) of organic origin 05/12/2023     Priority: Medium    Environmental allergies 05/12/2023     Priority: Medium    Hyperlipidemia 05/12/2023     Priority: Medium    Primary osteoarthritis of right knee 05/12/2023     Priority: Medium    Pure hypercholesterolemia 05/12/2023     Priority: Medium    Rheumatoid arthritis (H) 05/12/2023     Priority: Medium    S/p nephrectomy 05/12/2023     Priority: Medium    FH: total knee replacement 12/19/2022     Priority: Medium    Right kidney mass 04/06/2022     Priority: Medium    Left inguinal hernia 11/13/2020     Priority: Medium     Formatting of this note might be different from the original.  Added  automatically from request for surgery [id number]      Psoriasis 02/13/2020     Priority: Medium    Raised prostate specific antigen 02/26/2019     Priority: Medium    Increased frequency of urination 01/17/2019     Priority: Medium    Degenerative joint disease of sacroiliac joint (H) 04/17/2018     Priority: Medium    Primary osteoarthritis involving multiple joints 04/17/2018     Priority: Medium    Chronic pain of right knee 03/16/2017     Priority: Medium    Psoriatic arthritis (H) 01/08/2016     Priority: Medium    CARDIOVASCULAR SCREENING; LDL GOAL LESS THAN 130 10/31/2010     Priority: Medium     Past Surgical History:   Procedure Laterality Date    CARPAL TUNNEL RELEASE RT/LT Bilateral     DAVINCI NEPHRECTOMY Right 04/06/2022    Procedure: NEPHRECTOMY, RADICAL, ROBOT-ASSISTED LAPAROSCOPIC;  Surgeon: Brad Luciano MD;  [facility]    [facility]20[facility]    Procedure: HERNIORRHAPHY, INGUINAL, ROBOT-ASSISTED, LAPAROSCOPIC, USING DA OBEY XI;  Surgeon: Jareth Julian MD;  [facility]    [facility]/27/20[facility]    Procedure: HERNIORRHAPHY, VENTRAL, ROBOT-ASSISTED, LAPAROSCOPIC, USING DA OBEY XI x 2;  Surgeon: Jareth Julian MD;  Location: [facility] OR    finger amputation surgery Right     Due to log splitter. x2 fingers    HERNIA REPAIR  01/01/1991    Right inguinal    LYSIS, ADHESIONS, ROBOT-ASSISTED, LAPAROSCOPIC, USING DA OBEY XI N/A 4/27/2023    Procedure: LYSIS, ADHESIONS, ROBOT-ASSISTED, LAPAROSCOPIC, USING DA OBEY XI;  Surgeon: Jareth Julian MD;  Location: [facility] OR    NOSE SURGERY      KS LAP,INGUINAL HERNIA REPR,INITIAL Left 12/17/2020    Procedure: HERNIORRHAPHY, INGUINAL, LAPAROSCOPIC;  Surgeon: Jareth Julian MD;  Location: [facility];  Service: General    VARICOCELE EXCISION        Past Medical History:   Diagnosis Date    Benign localized prostatic hyperplasia with lower urinary  "tract symptoms (LUTS) 5/12/2023    BPH (benign prostatic hyperplasia)     Calculus of kidney 5/12/2023    CARDIOVASCULAR SCREENING; LDL GOAL LESS THAN 130 10/31/2010    Chronic pain of right knee 3/16/2017    Chronic sinusitis, unspecified 5/12/2023    Degenerative joint disease of sacroiliac joint (H) 4/17/2018    ED (erectile dysfunction)     ED (erectile dysfunction) of organic origin 5/12/2023    Environmental allergies 5/12/2023    Family history of kidney stones     FH: total knee replacement 12/19/2022    Gastroesophageal reflux disease     Hyperlipidemia     Hypertension     Increased frequency of urination 1/17/2019    Kidney mass     Kidney stone     Knee pain     right- \"bone on bone\"    Left inguinal hernia 11/13/2020    Formatting of this note might be different from the original. Added automatically from request for surgery [id number]    Motion sickness     Primary osteoarthritis involving multiple joints 4/17/2018    Primary osteoarthritis of right knee 5/12/2023    Psoriasis 2/13/2020    Psoriatic arthritis (H) 01/08/2016    Pure hypercholesterolemia 5/12/2023    RA (rheumatoid arthritis) (H)     psoriatic    Raised prostate specific antigen 2/26/2019    Rheumatoid arthritis (H) 5/12/2023    Right kidney mass 4/6/2022    S/p nephrectomy 5/12/2023    Seasonal allergies     Sinusitis     Squamous cell carcinoma of skin      No Known Allergies  Current Outpatient Medications   Medication Sig Dispense Refill    fish oil-omega-3 fatty acids 1000 MG capsule Take 1 g by mouth daily      leflunomide (ARAVA) 20 MG tablet TAKE 1 TABLET BY MOUTH DAILY 30 tablet 0    Multiple Vitamin (MULTI VITAMIN) TABS Take 1 tablet by mouth daily       omeprazole (PRILOSEC) 20 MG DR capsule omeprazole 20 mg capsule,delayed release   TAKE 1 CAPSULE BY MOUTH EVERY DAY 30 MINUTES BEFORE MORNING MEAL      sildenafil (VIAGRA) 50 MG tablet Take  mg by mouth daily as needed Stop date 9/7/2022      Vitamin D3 (CHOLECALCIFEROL) 125 " MCG (5000 UT) tablet Take 1 tablet by mouth daily       family history includes Heart Disease in his father and mother.  Social Connections: Not on file          WBC Count   Date Value Ref Range Status   08/10/2023 7.0 4.0 - 11.0 10e3/uL Final     RBC Count   Date Value Ref Range Status   08/10/2023 4.54 4.40 - 5.90 10e6/uL Final     Hemoglobin   Date Value Ref Range Status   08/10/2023 13.5 13.3 - 17.7 g/dL Final     Hematocrit   Date Value Ref Range Status   08/10/2023 40.2 40.0 - 53.0 % Final     MCV   Date Value Ref Range Status   08/10/2023 89 78 - 100 fL Final     MCH   Date Value Ref Range Status   08/10/2023 29.7 26.5 - 33.0 pg Final     Platelet Count   Date Value Ref Range Status   08/10/2023 256 150 - 450 10e3/uL Final     % Lymphocytes   Date Value Ref Range Status   04/17/2018 20 20 - 40 % Final     ALT   Date Value Ref Range Status   08/10/2023 14 0 - 70 U/L Final     Comment:     Reference intervals for this test were updated on 6/12/2023 to more accurately reflect our healthy population. There may be differences in the flagging of prior results with similar values performed with this method. Interpretation of those prior results can be made in the context of the updated reference intervals.       Albumin   Date Value Ref Range Status   08/10/2023 4.1 3.5 - 5.2 g/dL Final   05/11/2022 3.8 3.5 - 5.0 g/dL Final     Creatinine   Date Value Ref Range Status   08/10/2023 1.30 (H) 0.67 - 1.17 mg/dL Final     GFR Estimate   Date Value Ref Range Status   08/10/2023 62 >60 mL/min/1.73m2 Final   06/29/2021 >60 >60 mL/min/1.73m2 Final     GFR Estimate If Black   Date Value Ref Range Status   06/29/2021 >60 >60 mL/min/1.73m2 Final     Erythrocyte Sedimentation Rate   Date Value Ref Range Status   04/17/2018 15 0 - 15 mm/hr Final     CRP   Date Value Ref Range Status   04/17/2018 0.7 0.0 - 0.8 mg/dL Final

## 2023-09-26 ENCOUNTER — OFFICE VISIT (OUTPATIENT)
Dept: FAMILY MEDICINE | Facility: CLINIC | Age: 63
End: 2023-09-26
Payer: COMMERCIAL

## 2023-09-26 VITALS
BODY MASS INDEX: 28.66 KG/M2 | RESPIRATION RATE: 18 BRPM | HEIGHT: 72 IN | OXYGEN SATURATION: 97 % | TEMPERATURE: 98.3 F | HEART RATE: 62 BPM | SYSTOLIC BLOOD PRESSURE: 134 MMHG | DIASTOLIC BLOOD PRESSURE: 86 MMHG | WEIGHT: 211.6 LBS

## 2023-09-26 DIAGNOSIS — Z12.5 SCREENING PSA (PROSTATE SPECIFIC ANTIGEN): ICD-10-CM

## 2023-09-26 DIAGNOSIS — M05.79 RHEUMATOID ARTHRITIS INVOLVING MULTIPLE SITES WITH POSITIVE RHEUMATOID FACTOR (H): ICD-10-CM

## 2023-09-26 DIAGNOSIS — H61.22 IMPACTED CERUMEN OF LEFT EAR: ICD-10-CM

## 2023-09-26 DIAGNOSIS — E78.2 MIXED HYPERLIPIDEMIA: Primary | ICD-10-CM

## 2023-09-26 DIAGNOSIS — I77.819 AORTIC DILATATION (H): ICD-10-CM

## 2023-09-26 DIAGNOSIS — D84.9 IMMUNODEFICIENCY (H): ICD-10-CM

## 2023-09-26 DIAGNOSIS — R35.0 INCREASED FREQUENCY OF URINATION: ICD-10-CM

## 2023-09-26 PROBLEM — N28.89 RIGHT KIDNEY MASS: Status: RESOLVED | Noted: 2022-04-06 | Resolved: 2023-09-26

## 2023-09-26 PROBLEM — L40.9 PSORIASIS: Status: RESOLVED | Noted: 2020-02-13 | Resolved: 2023-09-26

## 2023-09-26 PROBLEM — M25.561 CHRONIC PAIN OF RIGHT KNEE: Status: RESOLVED | Noted: 2017-03-16 | Resolved: 2023-09-26

## 2023-09-26 PROBLEM — G89.29 CHRONIC PAIN OF RIGHT KNEE: Status: RESOLVED | Noted: 2017-03-16 | Resolved: 2023-09-26

## 2023-09-26 LAB
CHOLEST SERPL-MCNC: 215 MG/DL
HDLC SERPL-MCNC: 43 MG/DL
LDLC SERPL CALC-MCNC: 151 MG/DL
NONHDLC SERPL-MCNC: 172 MG/DL
PSA SERPL DL<=0.01 NG/ML-MCNC: 9.78 NG/ML (ref 0–4.5)
TRIGL SERPL-MCNC: 105 MG/DL

## 2023-09-26 PROCEDURE — G0103 PSA SCREENING: HCPCS | Performed by: PHYSICIAN ASSISTANT

## 2023-09-26 PROCEDURE — 69210 REMOVE IMPACTED EAR WAX UNI: CPT | Mod: LT | Performed by: PHYSICIAN ASSISTANT

## 2023-09-26 PROCEDURE — 90471 IMMUNIZATION ADMIN: CPT | Performed by: PHYSICIAN ASSISTANT

## 2023-09-26 PROCEDURE — 90682 RIV4 VACC RECOMBINANT DNA IM: CPT | Performed by: PHYSICIAN ASSISTANT

## 2023-09-26 PROCEDURE — 80061 LIPID PANEL: CPT | Performed by: PHYSICIAN ASSISTANT

## 2023-09-26 PROCEDURE — 99204 OFFICE O/P NEW MOD 45 MIN: CPT | Mod: 25 | Performed by: PHYSICIAN ASSISTANT

## 2023-09-26 PROCEDURE — 36415 COLL VENOUS BLD VENIPUNCTURE: CPT | Performed by: PHYSICIAN ASSISTANT

## 2023-09-26 ASSESSMENT — PATIENT HEALTH QUESTIONNAIRE - PHQ9
SUM OF ALL RESPONSES TO PHQ QUESTIONS 1-9: 0
10. IF YOU CHECKED OFF ANY PROBLEMS, HOW DIFFICULT HAVE THESE PROBLEMS MADE IT FOR YOU TO DO YOUR WORK, TAKE CARE OF THINGS AT HOME, OR GET ALONG WITH OTHER PEOPLE: NOT DIFFICULT AT ALL
SUM OF ALL RESPONSES TO PHQ QUESTIONS 1-9: 0

## 2023-09-26 ASSESSMENT — PAIN SCALES - GENERAL: PAINLEVEL: NO PAIN (0)

## 2023-09-26 NOTE — PROGRESS NOTES
"  Assessment & Plan     (E78.2) Mixed hyperlipidemia  (primary encounter diagnosis)  Comment:   Plan: Lipid panel reflex to direct LDL Non-fasting        Recheck cholesterol today and add  statin if indicated.  Continue with lifestyle changes: Low animal protein diet, 30 minutes of elevated heart rate per day, avoiding alcohol and tobacco products for heart health      (M05.79) Rheumatoid arthritis involving multiple sites with positive rheumatoid factor (H)  Comment: chronic stable  Plan: Closely by Dr. Rhoades with rheum, stable on the leflunomide, labs and visits are up-to-date    (R35.0) Increased frequency of urination  Comment:   Plan: Patient with a history of BPH and elevated PSA, redraw PSA today as well as patient advised to make appointment with [facility] Dr. Srinivasa Montoya who is established within the past    (I54.560) Aortic dilatation (H)  Comment:   Plan: Adult Cardiology Eval  Referral        Previous incidental finding, referral placed to cardiology today for long-term monitoring, patient advised that best prevention is control of blood pressure and cholesterol.    (Z12.5) Screening PSA (prostate specific antigen)  Comment:   Plan: PSA, screen            (H61.22) Impacted cerumen of left ear  Comment:   Plan: OR REMOVAL IMPACTED CERUMEN IRRIGATION/LVG         UNILAT        See procedure note, follow-up if not improving    (D84.9) Immunodeficiency (H)  Comment:   Plan: Due to long-term use of leflunomide, followed by rheumatology             BMI:   Estimated body mass index is 29.1 kg/m  as calculated from the following:    Height as of this encounter: 1.816 m (5' 11.5\").    Weight as of this encounter: 96 kg (211 lb 9.6 oz).           Carlos Bhatt PA-C  [facility]    Drew Loyola is a 63 year old, presenting for the following health issues:  Results (Check ears )      9/26/2023    10:44 AM   Additional Questions   Roomed by Nicole ROACH       History of " "Present Illness       Hypertension: He presents for follow up of hypertension.  He does not check blood pressure  regularly outside of the clinic. Outpatient blood pressures have not been over 140/90. He does not follow a low salt diet.     He eats 2-3 servings of fruits and vegetables daily.He consumes 1 sweetened beverage(s) daily.He exercises with enough effort to increase his heart rate 20 to 29 minutes per day.  He exercises with enough effort to increase his heart rate 4 days per week.   He is taking medications regularly.     Patient with a incidental finding of dilated aortic root during follow-up for his renal cancer, needs to establish meant of care with cardiology for close monitoring.    Patient with a history of rheumatoid arthritis, follows closely with Dr. Kothari, stable on leflunomide at this time, labs performed every 3 months and visits every 6.  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              Review of Systems         Objective    /86 (BP Location: Right arm, Patient Position: Sitting, Cuff Size: Adult Large)   Pulse 62   Temp 98.3  F (36.8  C) (Temporal)   Resp 18   Ht 1.816 m (5' 11.5\")   Wt 96 kg (211 lb 9.6 oz)   SpO2 97%   BMI 29.10 kg/m    Body mass index is 29.1 kg/m .  Physical Exam   GENERAL: healthy, alert and no distress  HENT: normal cephalic/atraumatic, left ear: occluded with wax, nose and mouth without ulcers or lesions, oropharynx clear, and oral mucous membranes moist  NECK: no adenopathy, no asymmetry, masses, or scars and thyroid normal to palpation  RESP: lungs clear to auscultation - no rales, rhonchi or wheezes  CV: regular rate and rhythm, normal S1 S2, no S3 or S4, no murmur, click or rub, no peripheral edema and peripheral pulses strong  ABDOMEN: soft, nontender, no hepatosplenomegaly, no masses and bowel sounds normal  MS: no gross " musculoskeletal defects noted, no edema        CERUMEN REMOVAL: After obtaining verbal consent, both ear canals were irrigated by myself with water, used cerumen spoon to remove any additional wax from the ear canal, following the procedure bilateral tympanic membranes were intact and canals were with normal appearance.

## 2023-09-26 NOTE — COMMUNITY RESOURCES LIST (ENGLISH)
09/26/2023   [facility]  N/A  For questions about this resource list or additional care needs, please contact your primary care clinic or care manager.  Phone: [phone]   Email: N/A   Address: [address] [place] [address]   Hours: N/A        Food and Nutrition       Food pantry  1  [facility] Distance: 1.09 miles      In-Person, Delivery, Pickup   [address] [place] [address]  Language: English, Botswanan  Hours: Mon - Fri 8:45 AM - 11:30 AM , Mon - Fri 1:00 PM - 3:30 PM  Fees: Free   Phone: (386) 108-8481 Email: info@Udemymn.My Hood Website: http://www.Bokee     2  [facility] Food Posto7 Distance: 2.69 miles      Pickup   [address] [place] [address]  Language: Macedonian, English, Hmong, Isabell, Botswanan  Hours: Mon 1:00 PM - 4:00 PM , Mon 5:00 PM - 6:30 PM , Tue - Fri 9:00 AM - 11:30 AM , Tue - Fri 1:00 PM - 3:30 PM  Fees: Free   Phone: (298) 132-6466 Website: https://Cardiovascular Simulation/     SNAP application assistance  3  [facility] Outreach Distance: 2.69 miles      Phone/Virtual   [address] [place] [address]  Language: Macedonian, English, Hmong, Isabell, Botswanan  Hours: Mon - Fri 10:00 AM - 12:00 PM , Mon - Fri 2:00 PM - 4:00 PM  Fees: Free   Phone: (943) 414-4124 Website: https://Cardiovascular Simulation/     4  [facility] - MNFoodHelper (SNAP) Distance: 4.39 miles      Phone/Virtual   [address] [address] [place] [address]  Language: English, Hmong, Malawian, Namibian, Botswanan, Belgian  Hours: Mon - Fri 9:00 AM - 5:00 PM  Fees: Free   Phone: (620) 823-5227 Website: https://[place].gov/dhs/people-we-serve/adults/economic-assistance/food-nutrition/programs-and-services/supplemental-nutrition-assistance-program.jsp     Soup kitchen or free meals  5  [facility] - Take 'n Bake Meals Distance: 0.89 miles      Pickup   [address] [place] [address]  Language: English  Hours: Mon 3:00 PM - 8:00 PM , Tue - Fri 5:00 AM - 8:00 PM , Sat 7:00 AM - 2:00 PM  Fees: Free   Phone: (508) 547-4076 Website: https://communityed.\[facility]\"".org/     6  [facility] - Loaves [employer] Fishes Distance: 2.44 miles      In-Person, Pickup   [address] [place] [address]  Language: English  Hours: Mon - Thu 5:00 PM - 6:30 PM  Fees: Free   Phone: (286) 634-1468 Email: office@BestVendor Website: http://BareedEE.Droidhen/          Important Numbers & Websites       Emergency Services   911  [facility] Services   311  Poison Control   (905) 572-2531  Suicide Prevention Lifeline   (672) 389-5284 (TALK)  Child Abuse Hotline   (766) 995-5432 (4-A-Child)  Sexual Assault Hotline   (423) 197-5409 (HOPE)  National Runaway Safeline   (732) 255-5716 (RUNAWAY)  All-Options Talkline   (574) 998-1896  Substance Abuse Referral   (127) 589-7474 (HELP)

## 2023-09-26 NOTE — COMMUNITY RESOURCES LIST (ENGLISH)
09/26/2023   [facility]  N/A  For questions about this resource list or additional care needs, please contact your primary care clinic or care manager.  Phone: [phone]   Email: N/A   Address: [address] [place] [address]   Hours: N/A        Food and Nutrition       Food pantry  1  [facility] Distance: 1.09 miles      In-Person, Delivery, Pickup   [address] [place] [address]  Language: English, Canadian  Hours: Mon - Fri 8:45 AM - 11:30 AM , Mon - Fri 1:00 PM - 3:30 PM  Fees: Free   Phone: (671) 109-3853 Email: info@Seek & Adoremn.Ascenergy Website: http://www.MiArch     2  [facility] Food UrbanTakeover Distance: 2.69 miles      Pickup   [address] [place] [address]  Language: Nepali, English, Hmong, Isabell, Canadian  Hours: Mon 1:00 PM - 4:00 PM , Mon 5:00 PM - 6:30 PM , Tue - Fri 9:00 AM - 11:30 AM , Tue - Fri 1:00 PM - 3:30 PM  Fees: Free   Phone: (652) 633-1365 Website: https://Pronia Medical Systems/     SNAP application assistance  3  [facility] Outreach Distance: 2.69 miles      Phone/Virtual   [address] [place] [address]  Language: Nepali, English, Hmong, Isabell, Canadian  Hours: Mon - Fri 10:00 AM - 12:00 PM , Mon - Fri 2:00 PM - 4:00 PM  Fees: Free   Phone: (919) 672-4490 Website: https://Pronia Medical Systems/     4  [facility] of Human [facility] - MNFoodHelper (SNAP) Distance: 4.39 miles      Phone/Virtual   [address] [address] [place] [address]  Language: English, Hmong, Omani, Palestinian, Canadian, Venezuelan  Hours: Mon - Fri 9:00 AM - 5:00 PM  Fees: Free   Phone: (901) 532-7366 Website: https://[place].gov/dhs/people-we-serve/adults/economic-assistance/food-nutrition/programs-and-services/supplemental-nutrition-assistance-program.jsp     Soup kitchen or free meals  5  [facility] - Take 'n Bake Meals Distance: 0.89 miles      Pickup   [address] [place] [address]  Language: English  Hours: Mon 3:00 PM - 8:00 PM , Tue - Fri 5:00 AM - 8:00 PM , Sat 7:00 AM - 2:00 PM  Fees: Free   Phone: (677) 667-4540 Website: https://communityed.[facility].org/     6  [facility] - Loaves and Fishes Distance: 2.44 miles      In-Person, Pickup   [address] [place] [address]  Language: English  Hours: Mon - Thu 5:00 PM - 6:30 PM  Fees: Free   Phone: (379) 893-6503 Email: office@WOO Sports Website: http://Helloworld.OrthAlign/          Important Numbers & Websites       Emergency Services   911  [facility] Services   311  Poison Control   (511) 525-8886  Suicide Prevention Lifeline   (132) 648-8262 (TALK)  Child Abuse Hotline   (984) 239-9554 (4-A-Child)  Sexual Assault Hotline   (289) 171-5550 (HOPE)  National Runaway Safeline   (678) 365-8032 (RUNAWAY)  All-Options Talkline   (682) 863-6020  Substance Abuse Referral   (564) 352-4647 (HELP)

## 2023-09-27 ENCOUNTER — TELEPHONE (OUTPATIENT)
Dept: CARDIOLOGY | Facility: CLINIC | Age: 63
End: 2023-09-27
Payer: COMMERCIAL

## 2023-09-27 NOTE — TELEPHONE ENCOUNTER
[facility] Call Center    Phone Message    May a detailed message be left on voicemail: yes     Reason for Call: Other: TE was sent to [place] because the [facility] cannot schedule for the diagnosis listed and the patient asked for [place] or [place] so sent to both [facility], maybe should have done separate TE's Sorry   Patient was scheduled at [place] Thank you     Action Taken: Other: Cardiology    Travel Screening: Not Applicable    Thank you!  [facility]

## 2023-09-27 NOTE — TELEPHONE ENCOUNTER
[facility] Call Center    Phone Message    May a detailed message be left on voicemail: yes     Reason for Call: Appointment Intake    Referring Provider Name: NICHOLAS Bhatt  Diagnosis and/or Symptoms: Aortic Dilation    Action Taken: Other: Cardiology    Travel Screening: Not Applicable

## 2023-09-29 ENCOUNTER — MYC MEDICAL ADVICE (OUTPATIENT)
Dept: FAMILY MEDICINE | Facility: CLINIC | Age: 63
End: 2023-09-29
Payer: COMMERCIAL

## 2023-09-29 DIAGNOSIS — R97.20 ELEVATED PROSTATE SPECIFIC ANTIGEN (PSA): Primary | ICD-10-CM

## 2023-09-29 NOTE — TELEPHONE ENCOUNTER
Carlos,    Patient cannot be seen by urology until Dec 2023. Please advise if OK for him to wait or if you want to place a more urgent referral.    Also FYI - he was not fasting for his lipids on 9/26/2023.    Aurelia Vee, RN, BSN, PHN  [facility]  [phone]

## 2023-10-12 DIAGNOSIS — L40.9 PSORIASIS: ICD-10-CM

## 2023-10-12 DIAGNOSIS — L40.50 PSORIATIC ARTHRITIS (H): ICD-10-CM

## 2023-10-12 RX ORDER — LEFLUNOMIDE 20 MG/1
20 TABLET ORAL DAILY
Qty: 90 TABLET | Refills: 0 | Status: SHIPPED | OUTPATIENT
Start: 2023-10-12 | End: 2024-01-12

## 2023-11-15 ENCOUNTER — LAB (OUTPATIENT)
Dept: LAB | Facility: CLINIC | Age: 63
End: 2023-11-15
Payer: COMMERCIAL

## 2023-11-15 DIAGNOSIS — Z11.4 SCREENING FOR HIV (HUMAN IMMUNODEFICIENCY VIRUS): Primary | ICD-10-CM

## 2023-11-15 DIAGNOSIS — L40.50 PSORIATIC ARTHRITIS (H): ICD-10-CM

## 2023-11-15 LAB
ALBUMIN SERPL BCG-MCNC: 4 G/DL (ref 3.5–5.2)
ALT SERPL W P-5'-P-CCNC: 18 U/L (ref 0–70)
CREAT SERPL-MCNC: 1.27 MG/DL (ref 0.67–1.17)
EGFRCR SERPLBLD CKD-EPI 2021: 63 ML/MIN/1.73M2
ERYTHROCYTE [DISTWIDTH] IN BLOOD BY AUTOMATED COUNT: 13.1 % (ref 10–15)
HCT VFR BLD AUTO: 41.2 % (ref 40–53)
HGB BLD-MCNC: 13.6 G/DL (ref 13.3–17.7)
HIV 1+2 AB+HIV1 P24 AG SERPL QL IA: NONREACTIVE
MCH RBC QN AUTO: 29.2 PG (ref 26.5–33)
MCHC RBC AUTO-ENTMCNC: 33 G/DL (ref 31.5–36.5)
MCV RBC AUTO: 88 FL (ref 78–100)
PLATELET # BLD AUTO: 228 10E3/UL (ref 150–450)
RBC # BLD AUTO: 4.66 10E6/UL (ref 4.4–5.9)
WBC # BLD AUTO: 6.5 10E3/UL (ref 4–11)

## 2023-11-15 PROCEDURE — 87389 HIV-1 AG W/HIV-1&-2 AB AG IA: CPT

## 2023-11-15 PROCEDURE — 85027 COMPLETE CBC AUTOMATED: CPT

## 2023-11-15 PROCEDURE — 82040 ASSAY OF SERUM ALBUMIN: CPT

## 2023-11-15 PROCEDURE — 84460 ALANINE AMINO (ALT) (SGPT): CPT

## 2023-11-15 PROCEDURE — 36415 COLL VENOUS BLD VENIPUNCTURE: CPT

## 2023-11-15 PROCEDURE — 82565 ASSAY OF CREATININE: CPT

## 2023-11-20 ENCOUNTER — OFFICE VISIT (OUTPATIENT)
Dept: CARDIOLOGY | Facility: CLINIC | Age: 63
End: 2023-11-20
Payer: COMMERCIAL

## 2023-11-20 VITALS
WEIGHT: 214 LBS | BODY MASS INDEX: 29.43 KG/M2 | DIASTOLIC BLOOD PRESSURE: 84 MMHG | HEART RATE: 74 BPM | RESPIRATION RATE: 16 BRPM | SYSTOLIC BLOOD PRESSURE: 136 MMHG

## 2023-11-20 DIAGNOSIS — I10 PRIMARY HYPERTENSION: ICD-10-CM

## 2023-11-20 DIAGNOSIS — C64.1 CLEAR CELL CARCINOMA OF KIDNEY, RIGHT (H): ICD-10-CM

## 2023-11-20 DIAGNOSIS — I77.819 AORTIC DILATATION (H): Primary | ICD-10-CM

## 2023-11-20 PROCEDURE — 99204 OFFICE O/P NEW MOD 45 MIN: CPT | Performed by: INTERNAL MEDICINE

## 2023-11-20 RX ORDER — METOPROLOL SUCCINATE 25 MG/1
25 TABLET, EXTENDED RELEASE ORAL DAILY
Qty: 30 TABLET | Refills: 11 | Status: SHIPPED | OUTPATIENT
Start: 2023-11-20 | End: 2024-06-05

## 2023-11-20 NOTE — PROGRESS NOTES
"  [facility] HEART CARE   [address]  [place] [address]   [email or web address]   OFFICE: [phone]     CARDIOLOGY CLINIC NOTE     Thank you, Dr. Bhatt, Carlos Bronson, for asking the [facility] Heart Care team to see Mr. Kai Jim to  Consult (Aortic dilation)         Assessment/Recommendations   Assessment:    Non-aneurysmal dilation of the ascending aorta - measures 4.4 cm by CT report.  Hypertension - BP uncontrolled today, but generally better controlled.  Renal cell carcinoma s/p R nephrectomy - currently in remission but has CT scans every 6 months for surveillance.    Plan:  Echocardiogram to assess cardiac function and screen for bicuspid aortic valve.  Start metoprolol succinate 25 mg daily. If he has side effects from this, then would use losartan.  Continue other medications without changes  Follow up in July, 2024         History of Present Illness   Mr. Kai Jim is a 63 year old male with a significant past history of renal cell carcinoma s/p R nephrectomy who presents for evaluation of a dilated aorta.    Mr. Jim had a CT c/a/p to evaluate follow-up of renal cell cancer, s/p nephrectomy. This study incidentally noted mild non-aneurysmal dilation of the ascending aorta measuring 4.4 cm (according to a progress note by Carlos Bhatt PA-C). The CT report is not available for my review. Nir is asymptomatic from a cardiac standpoint. 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 He does note some generalized fatigue that he attributes to \"not enough sleep\".    He denies a family history of aortic aneurysm but thinks his father may have had a bicuspid aortic valve.    Other than noted above, Mr. Jim denies any chest pain/pressure/tightness, shortness of breath at rest or with exertion, light headedness/dizziness, pre-syncope, syncope, lower extremity swelling, palpitations, " paroxysmal nocturnal dyspnea (PND), or orthopnea.     Cardiac Problems and Cardiac Diagnostics     Most Recent Cardiac testing:    No recent cardiac testing.       Medications  Allergies   Current Outpatient Medications   Medication Sig Dispense Refill    fish oil-omega-3 fatty acids 1000 MG capsule Take 1 g by mouth daily      leflunomide (ARAVA) 20 MG tablet TAKE 1 TABLET(20 MG) BY MOUTH DAILY 90 tablet 0    metoprolol succinate ER (TOPROL XL) 25 MG 24 hr tablet Take 1 tablet (25 mg) by mouth daily 30 tablet 11    Multiple Vitamin (MULTI VITAMIN) TABS Take 1 tablet by mouth daily       omeprazole (PRILOSEC) 20 MG DR capsule omeprazole 20 mg capsule,delayed release   TAKE 1 CAPSULE BY MOUTH EVERY DAY 30 MINUTES BEFORE MORNING MEAL      sildenafil (VIAGRA) 50 MG tablet Take  mg by mouth daily as needed Stop date 9/7/2022      Vitamin D3 (CHOLECALCIFEROL) 125 MCG (5000 UT) tablet Take 1 tablet by mouth daily        No Known Allergies     Physical Examination Review of Systems   Vitals: BP (!) 151/94 (BP Location: Right arm, Patient Position: Sitting, Cuff Size: Adult Large)   Pulse 74   Resp 16   Wt 97.1 kg (214 lb)   BMI 29.43 kg/m    BMI= Body mass index is 29.43 kg/m .  Wt Readings from Last 3 Encounters:   11/20/23 97.1 kg (214 lb)   09/26/23 96 kg (211 lb 9.6 oz)   08/15/23 94 kg (207 lb 4.8 oz)       General: pleasant male. No acute distress.   HENT: external ears normal. Nares patent. Mucous membranes moist.  Eyes: perrla, extraocular muscles intact. No scleral icterus.   Neck: No JVD  Lungs: clear to auscultation  COR: regular rate and rhythm, No murmurs, rubs, or gallops  Abd: nondistended, soft  Extrem: No edema        Please refer above for cardiac ROS details.       Past History   Past Medical History:   Past Medical History:   Diagnosis Date    Benign localized prostatic hyperplasia with lower urinary tract symptoms (LUTS) 5/12/2023    BPH (benign prostatic hyperplasia)     Calculus of kidney  "5/12/2023    CARDIOVASCULAR SCREENING; LDL GOAL LESS THAN 130 10/31/2010    Chronic pain of right knee 3/16/2017    Chronic sinusitis, unspecified 5/12/2023    Degenerative joint disease of sacroiliac joint (H24) 4/17/2018    ED (erectile dysfunction)     ED (erectile dysfunction) of organic origin 5/12/2023    Environmental allergies 5/12/2023    Family history of kidney stones     FH: total knee replacement 12/19/2022    Gastroesophageal reflux disease     Hyperlipidemia     Hypertension     Increased frequency of urination 1/17/2019    Kidney mass     Kidney stone     Knee pain     right- \"bone on bone\"    Left inguinal hernia 11/13/2020    Formatting of this note might be different from the original. Added automatically from request for surgery [id number]    Motion sickness     Primary osteoarthritis involving multiple joints 4/17/2018    Primary osteoarthritis of right knee 5/12/2023    Psoriasis 2/13/2020    Psoriatic arthritis (H) 01/08/2016    Pure hypercholesterolemia 5/12/2023    RA (rheumatoid arthritis) (H)     psoriatic    Raised prostate specific antigen 2/26/2019    Rheumatoid arthritis (H) 5/12/2023    Right kidney mass 4/6/2022    S/p nephrectomy 5/12/2023    Seasonal allergies     Sinusitis     Squamous cell carcinoma of skin         Past Surgical History:   Past Surgical History:   Procedure Laterality Date    CARPAL TUNNEL RELEASE RT/LT Bilateral     DAVINCI NEPHRECTOMY Right 04/06/2022    Procedure: NEPHRECTOMY, RADICAL, ROBOT-ASSISTED LAPAROSCOPIC;  Surgeon: Brad Luciano MD;  Location: [facility] OR    MetrilusINCI XI HERNIORRHAPHY INGUINAL Right 4/27/2023    Procedure: HERNIORRHAPHY, INGUINAL, ROBOT-ASSISTED, LAPAROSCOPIC, USING DA OBEY XI;  Surgeon: Jareth [place] MD;  Location: [facility] OR    MetrilusINCI XI HERNIORRHAPHY VENTRAL N/A 4/27/2023    Procedure: HERNIORRHAPHY, VENTRAL, ROBOT-ASSISTED, LAPAROSCOPIC, USING DA OBEY XI x 2;  Surgeon: Jareth [place] MD;  Location: [facility]" OR    finger amputation surgery Right     Due to log splitter. x2 fingers    HERNIA REPAIR  1991    Right inguinal    LYSIS, ADHESIONS, ROBOT-ASSISTED, LAPAROSCOPIC, USING DA OBEY XI N/A 2023    Procedure: LYSIS, ADHESIONS, ROBOT-ASSISTED, LAPAROSCOPIC, USING DA OBEY XI;  Surgeon: Jareth [place] MD;  Location: [facility]    NOSE SURGERY      [place] LAP,INGUINAL HERNIA REPR,INITIAL Left 2020    Procedure: HERNIORRHAPHY, INGUINAL, LAPAROSCOPIC;  Surgeon: Jareth [place] MD;  [facility];  Service: General    VARICOCELE EXCISION          Family History:   Family History   Problem Relation Age of Onset    Heart Disease Mother     Heart Disease Father         Social History:   Social History     Socioeconomic History    Marital status:      Spouse name: Not on file    Number of children: Not on file    Years of education: Not on file    Highest education level: Not on file   Occupational History    Not on file   Tobacco Use    Smoking status: Former     Types: Cigars     Quit date: 3/12/2019     Years since quittin.6    Smokeless tobacco: Never   Vaping Use    Vaping Use: Never used   Substance and Sexual Activity    Alcohol use: Yes     Comment: occasional    Drug use: Not Currently    Sexual activity: Not on file   Other Topics Concern    Parent/sibling w/ CABG, MI or angioplasty before 65F 55M? Not Asked   Social History Narrative    Not on file     Social Determinants of Health     Financial Resource Strain: Low Risk  (2023)    Financial Resource Strain     Within the past 12 months, have you or your family members you live with been unable to get utilities (heat, electricity) when it was really needed?: No   Food Insecurity: High Risk (2023)    Food Insecurity     Within the past 12 months, did you worry that your food would run out before you got money to buy more?: No     Within the past 12 months, did the food you bought just not last and you didn t  "have money to get more?: Yes   Transportation Needs: Low Risk  (9/26/2023)    Transportation Needs     Within the past 12 months, has lack of transportation kept you from medical appointments, getting your medicines, non-medical meetings or appointments, work, or from getting things that you need?: No   Physical Activity: Not on file   Stress: Not on file   Social Connections: Not on file   Interpersonal Safety: Low Risk  (9/26/2023)    Interpersonal Safety     Do you feel physically and emotionally safe where you currently live?: Yes     Within the past 12 months, have you been hit, slapped, kicked or otherwise physically hurt by someone?: No     Within the past 12 months, have you been humiliated or emotionally abused in other ways by your partner or ex-partner?: No   Housing Stability: Low Risk  (9/26/2023)    Housing Stability     Do you have housing? : Yes     Are you worried about losing your housing?: No            Lab Results    Chemistry/lipid CBC Cardiac Enzymes/BNP/TSH/INR   Lab Results   Component Value Date    CHOL 215 (H) 09/26/2023    HDL 43 09/26/2023    TRIG 105 09/26/2023    BUN 20.0 04/06/2023     04/06/2023    CO2 24 04/06/2023    Lab Results   Component Value Date    WBC 6.5 11/15/2023    HGB 13.6 11/15/2023    HCT 41.2 11/15/2023    MCV 88 11/15/2023     11/15/2023    No results found for: \"CKTOTAL\", \"CKMB\", \"TROPONINI\", \"BNP\", \"TSH\", \"INR\"         "

## 2023-11-20 NOTE — LETTER
"11/20/2023    Carlos Bhatt PA-C  [address], [address]  [place] [address]    RE: Kai Jim       Dear Colleague,     I had the pleasure of seeing Kai Jim in the [facility].    [facility]   [address]  [place] [address]   [email or web address]   OFFICE: [phone]     CARDIOLOGY CLINIC NOTE     Thank you, Dr. Bhatt, Carlos Bronson, for asking the [facility] team to see Mr. Kai Jim to  Consult (Aortic dilation)         Assessment/Recommendations   Assessment:    Non-aneurysmal dilation of the ascending aorta - measures 4.4 cm by CT report.  Hypertension - BP uncontrolled today, but generally better controlled.  Renal cell carcinoma s/p R nephrectomy - currently in remission but has CT scans every 6 months for surveillance.    Plan:  Echocardiogram to assess cardiac function and screen for bicuspid aortic valve.  Start metoprolol succinate 25 mg daily. If he has side effects from this, then would use losartan.  Continue other medications without changes  Follow up in July, 2024         History of Present Illness   Mr. Kai Jim is a 63 year old male with a significant past history of renal cell carcinoma s/p R nephrectomy who presents for evaluation of a dilated aorta.    Mr. Jim had a CT c/a/p to evaluate follow-up of renal cell cancer, s/p nephrectomy. This study incidentally noted mild non-aneurysmal dilation of the ascending aorta measuring 4.4 cm (according to a progress note by Carlos Bhatt PA-C). The CT report is not available for my review. Nir is asymptomatic from a cardiac standpoint. 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 He does note some generalized fatigue that he attributes to \"not enough sleep\".    He denies a family history of aortic aneurysm but thinks his father may have had a bicuspid aortic " valve.    Other than noted above, Mr. Jim denies any chest pain/pressure/tightness, shortness of breath at rest or with exertion, light headedness/dizziness, pre-syncope, syncope, lower extremity swelling, palpitations, paroxysmal nocturnal dyspnea (PND), or orthopnea.     Cardiac Problems and Cardiac Diagnostics     Most Recent Cardiac testing:    No recent cardiac testing.       Medications  Allergies   Current Outpatient Medications   Medication Sig Dispense Refill    fish oil-omega-3 fatty acids 1000 MG capsule Take 1 g by mouth daily      leflunomide (ARAVA) 20 MG tablet TAKE 1 TABLET(20 MG) BY MOUTH DAILY 90 tablet 0    metoprolol succinate ER (TOPROL XL) 25 MG 24 hr tablet Take 1 tablet (25 mg) by mouth daily 30 tablet 11    Multiple Vitamin (MULTI VITAMIN) TABS Take 1 tablet by mouth daily       omeprazole (PRILOSEC) 20 MG DR capsule omeprazole 20 mg capsule,delayed release   TAKE 1 CAPSULE BY MOUTH EVERY DAY 30 MINUTES BEFORE MORNING MEAL      sildenafil (VIAGRA) 50 MG tablet Take  mg by mouth daily as needed Stop date 9/7/2022      Vitamin D3 (CHOLECALCIFEROL) 125 MCG (5000 UT) tablet Take 1 tablet by mouth daily        No Known Allergies     Physical Examination Review of Systems   Vitals: BP (!) 151/94 (BP Location: Right arm, Patient Position: Sitting, Cuff Size: Adult Large)   Pulse 74   Resp 16   Wt 97.1 kg (214 lb)   BMI 29.43 kg/m    BMI= Body mass index is 29.43 kg/m .  Wt Readings from Last 3 Encounters:   11/20/23 97.1 kg (214 lb)   09/26/23 96 kg (211 lb 9.6 oz)   08/15/23 94 kg (207 lb 4.8 oz)       General: pleasant male. No acute distress.   HENT: external ears normal. Nares patent. Mucous membranes moist.  Eyes: perrla, extraocular muscles intact. No scleral icterus.   Neck: No JVD  Lungs: clear to auscultation  COR: regular rate and rhythm, No murmurs, rubs, or gallops  Abd: nondistended, soft  Extrem: No edema        Please refer above for cardiac ROS details.       Past History  "  Past Medical History:   Past Medical History:   Diagnosis Date    Benign localized prostatic hyperplasia with lower urinary tract symptoms (LUTS) 5/12/2023    BPH (benign prostatic hyperplasia)     Calculus of kidney 5/12/2023    CARDIOVASCULAR SCREENING; LDL GOAL LESS THAN 130 10/31/2010    Chronic pain of right knee 3/16/2017    Chronic sinusitis, unspecified 5/12/2023    Degenerative joint disease of sacroiliac joint (H24) 4/17/2018    ED (erectile dysfunction)     ED (erectile dysfunction) of organic origin 5/12/2023    Environmental allergies 5/12/2023    Family history of kidney stones     FH: total knee replacement 12/19/2022    Gastroesophageal reflux disease     Hyperlipidemia     Hypertension     Increased frequency of urination 1/17/2019    Kidney mass     Kidney stone     Knee pain     right- \"bone on bone\"    Left inguinal hernia 11/13/2020    Formatting of this note might be different from the original. Added automatically from request for surgery [id number]    Motion sickness     Primary osteoarthritis involving multiple joints 4/17/2018    Primary osteoarthritis of right knee 5/12/2023    Psoriasis 2/13/2020    Psoriatic arthritis (H) 01/08/2016    Pure hypercholesterolemia 5/12/2023    RA (rheumatoid arthritis) (H)     psoriatic    Raised prostate specific antigen 2/26/2019    Rheumatoid arthritis (H) 5/12/2023    Right kidney mass 4/6/2022    S/p nephrectomy 5/12/2023    Seasonal allergies     Sinusitis     Squamous cell carcinoma of skin         Past Surgical History:   Past Surgical History:   Procedure Laterality Date    CARPAL TUNNEL RELEASE RT/LT Bilateral     DAVINCI NEPHRECTOMY Right 04/06/2022    Procedure: NEPHRECTOMY, RADICAL, ROBOT-ASSISTED LAPAROSCOPIC;  Surgeon: Brad Luciano MD;  Location: [facility] OR    [facility] XI HERNIORRHAPHY INGUINAL Right 4/27/2023    Procedure: HERNIORRHAPHY, INGUINAL, ROBOT-ASSISTED, LAPAROSCOPIC, USING DA OBEY XI;  Surgeon: Jareth Julian MD;  " Location: [facility]    DAVINCI XI HERNIORRHAPHY VENTRAL N/A 2023    Procedure: HERNIORRHAPHY, VENTRAL, ROBOT-ASSISTED, LAPAROSCOPIC, USING DA OBEY XI x 2;  Surgeon: Jareth Julian MD;  Location: [facility] OR    finger amputation surgery Right     Due to log splitter. x2 fingers    HERNIA REPAIR  1991    Right inguinal    LYSIS, ADHESIONS, ROBOT-ASSISTED, LAPAROSCOPIC, USING DA OBEY XI N/A 2023    Procedure: LYSIS, ADHESIONS, ROBOT-ASSISTED, LAPAROSCOPIC, USING DA OBEY XI;  Surgeon: Jareth Julian MD;  Location: [facility] OR    NOSE SURGERY      VT LAP,INGUINAL HERNIA REPR,INITIAL Left 2020    Procedure: HERNIORRHAPHY, INGUINAL, LAPAROSCOPIC;  Surgeon: Jareth Julian MD;  Location: [facility];  Service: General    VARICOCELE EXCISION          Family History:   Family History   Problem Relation Age of Onset    Heart Disease Mother     Heart Disease Father         Social History:   Social History     Socioeconomic History    Marital status:      Spouse name: Not on file    Number of children: Not on file    Years of education: Not on file    Highest education level: Not on file   Occupational History    Not on file   Tobacco Use    Smoking status: Former     Types: Cigars     Quit date: 3/12/2019     Years since quittin.6    Smokeless tobacco: Never   Vaping Use    Vaping Use: Never used   Substance and Sexual Activity    Alcohol use: Yes     Comment: occasional    Drug use: Not Currently    Sexual activity: Not on file   Other Topics Concern    Parent/sibling w/ CABG, MI or angioplasty before 65F 55M? Not Asked   Social History Narrative    Not on file     Social Determinants of Health     Financial Resource Strain: Low Risk  (2023)    Financial Resource Strain     Within the past 12 months, have you or your family members you live with been unable to get utilities (heat, electricity) when it was really needed?: No   Food Insecurity: High Risk  "(9/26/2023)    Food Insecurity     Within the past 12 months, did you worry that your food would run out before you got money to buy more?: No     Within the past 12 months, did the food you bought just not last and you didn t have money to get more?: Yes   Transportation Needs: Low Risk  (9/26/2023)    Transportation Needs     Within the past 12 months, has lack of transportation kept you from medical appointments, getting your medicines, non-medical meetings or appointments, work, or from getting things that you need?: No   Physical Activity: Not on file   Stress: Not on file   Social Connections: Not on file   Interpersonal Safety: Low Risk  (9/26/2023)    Interpersonal Safety     Do you feel physically and emotionally safe where you currently live?: Yes     Within the past 12 months, have you been hit, slapped, kicked or otherwise physically hurt by someone?: No     Within the past 12 months, have you been humiliated or emotionally abused in other ways by your partner or ex-partner?: No   Housing Stability: Low Risk  (9/26/2023)    Housing Stability     Do you have housing? : Yes     Are you worried about losing your housing?: No            Lab Results    Chemistry/lipid CBC Cardiac Enzymes/BNP/TSH/INR   Lab Results   Component Value Date    CHOL 215 (H) 09/26/2023    HDL 43 09/26/2023    TRIG 105 09/26/2023    BUN 20.0 04/06/2023     04/06/2023    CO2 24 04/06/2023    Lab Results   Component Value Date    WBC 6.5 11/15/2023    HGB 13.6 11/15/2023    HCT 41.2 11/15/2023    MCV 88 11/15/2023     11/15/2023    No results found for: \"CKTOTAL\", \"CKMB\", \"TROPONINI\", \"BNP\", \"TSH\", \"INR\"             Thank you for allowing me to participate in the care of your patient.      Sincerely,     Alon Oliver MD     [facility]  cc:   Carlos Bhatt, PA-C  [address], [facility] [address]  [place] [address]      "

## 2023-11-20 NOTE — PATIENT INSTRUCTIONS
It was a pleasure to meet with you today.      Below is a summary of your visit.   Schedule an echocardiogram to evaluate your heart function and look at your heart valves  Start taking metoprolol succinate 25 mg once daily for blood pressure and to help slow or prevent further dilation of your aorta.   Follow-up with me in July of 2024    We will call you to inform you of your test or procedure results within 3 business days of the test being performed.  If you do not hear from our office with the test results within 1 week please do not hesitate to call asking for these results.     Please do not hesitate to call the [facility] with any questions or concerns at [phone].     Sincerely,

## 2023-12-14 ENCOUNTER — HOSPITAL ENCOUNTER (OUTPATIENT)
Dept: CARDIOLOGY | Facility: HOSPITAL | Age: 63
Discharge: HOME | End: 2023-12-14
Attending: INTERNAL MEDICINE | Admitting: INTERNAL MEDICINE
Payer: COMMERCIAL

## 2023-12-14 DIAGNOSIS — I77.819 AORTIC DILATATION (H): ICD-10-CM

## 2023-12-14 LAB — LVEF ECHO: NORMAL

## 2023-12-14 PROCEDURE — 93306 TTE W/DOPPLER COMPLETE: CPT

## 2023-12-14 PROCEDURE — 93306 TTE W/DOPPLER COMPLETE: CPT | Mod: 26 | Performed by: INTERNAL MEDICINE

## 2024-01-03 ENCOUNTER — TRANSFERRED RECORDS (OUTPATIENT)
Dept: HEALTH INFORMATION MANAGEMENT | Facility: CLINIC | Age: 64
End: 2024-01-03
Payer: COMMERCIAL

## 2024-01-12 DIAGNOSIS — L40.9 PSORIASIS: ICD-10-CM

## 2024-01-12 DIAGNOSIS — L40.50 PSORIATIC ARTHRITIS (H): ICD-10-CM

## 2024-01-12 RX ORDER — LEFLUNOMIDE 20 MG/1
20 TABLET ORAL DAILY
Qty: 90 TABLET | Refills: 0 | Status: SHIPPED | OUTPATIENT
Start: 2024-01-12 | End: 2024-04-11

## 2024-01-31 NOTE — PROGRESS NOTES
ASSESSMENT AND PLAN:  Kai Jim 60 y.o. male is seen here on 10/21/20 for evaluation of worsening pain of the right knee, in the background of psoriatic arthritis, likely due to OA, his PSA is under good control with Otezla, would like to proceed local injection into the right knee after pros and cons were reviewed 40 mg of Kenalog injected anterolateral approach she tolerated the procedure well.  Follow-up in 3 months.  Diagnoses and all orders for this visit:    Psoriatic arthritis (H)  -     apremilast (OTEZLA) 30 mg Tab; Take 1 tablet (30 mg total) by mouth 2 (two) times a day.  Dispense: 60 tablet; Refill: 11  -     triamcinolone acetonide 40 mg/mL injection 40 mg (KENALOG-40)    Primary osteoarthritis involving multiple joints  -     triamcinolone acetonide 40 mg/mL injection 40 mg (KENALOG-40)    Chronic pain of right knee    High risk medication use    Psoriasis          HISTORY OF PRESENTING ILLNESS ON 10/21/20 :  Kai Jim 60 y.o. is here for a moderately severe flare up of pain. Here for a moderately severe flare up of pain.  Joints affected include the right knee(s). This has gone on for several weeks. Pain is described as sharp. It is worse with activity at times bedtime..  Her symptoms are moderately severe. The symptoms are progressive.  Associated findings include /do not include: swelling, rash.  There is no associated recent fall or trauma.  Over-the-counter treatment to date has been without significant relief.    Further historical information, including ROS and limitation in activities as noted in the multidimensional health assessment questionnaire scanned in the EMR and in the assessment and plan section.    ALLERGIES:Patient has no known allergies.    PAST MEDICAL/ACTIVE PROBLEMS/MEDICATION/SOCIAL DATA  Past Medical History:   Diagnosis Date     Psoriatic arthritis (H) 1/8/2016     Social History     Tobacco Use   Smoking Status Former Smoker     Types: Cigars     Quit date:  Left detailed VM for [facility] PT on confidential voicemail box with the following verbal order(s): Home Care Orders: Other: PT 1X weeks for 4 weeks, 1 EO week for 2 weeks, 1 every 3 weeks for 3 weeks.  Danette BEAUCHAMP LPN  [facility]     3/12/2019     Years since quittin.6   Smokeless Tobacco Never Used     Patient Active Problem List   Diagnosis     Psoriatic arthritis (H)     High risk medication use     Chronic pain of right knee     Primary osteoarthritis involving multiple joints     Degenerative joint disease of sacroiliac joint (H)     Psoriasis     Current Outpatient Medications   Medication Sig Dispense Refill     apremilast (OTEZLA) 30 mg Tab Take 1 tablet (30 mg total) by mouth 2 (two) times a day. 60 tablet 11     calcipotriene (DOVONOX) 0.005 % ointment        clobetasol (TEMOVATE) 0.05 % ointment        leflunomide (ARAVA) 20 MG tablet TAKE 1 TABLET(20 MG) BY MOUTH DAILY 90 tablet 0     multivitamin with minerals (THERA-M) 9 mg iron-400 mcg Tab tablet Take 1 tablet by mouth daily.       omega-3/dha/epa/fish oil (FISH OIL-OMEGA-3 FATTY ACIDS) 300-1,000 mg capsule Take 2 g by mouth daily.       No current facility-administered medications for this visit.          DETAILED EXAMINATION  10/21/20  :  Vitals:    10/21/20 0917   BP: 128/80   Patient Site: Right Arm   Patient Position: Sitting   Cuff Size: Adult Large   Pulse: 68   Weight: 215 lb (97.5 kg)     Alert oriented. Head including the face is examined for malar rash, heliotropes, scarring, lupus pernio. Eyes examined for redness such as in episcleritis/scleritis, periorbital lesions.   Neck/ Face examined for parotid gland swelling, range of motion of neck.  Left upper and lower and right upper and lower extremities examined for tenderness, swelling, warmth of the appendicular joints, range of motion, edema, rash.  Some of the important findings included: Right knee is warmer than the left Marked joint line tenderness, he has lost final 20 degrees of flexion there.  He does not have dactylitis of the digits.         LAB / IMAGING DATA:  ALT   Date Value Ref Range Status   2020 25 0 - 45 U/L Final   2020 25 0 - 45 U/L Final   02/10/2020 25 0 - 45 U/L Final     Albumin    Date Value Ref Range Status   08/17/2020 3.8 3.5 - 5.0 g/dL Final   05/18/2020 3.6 3.5 - 5.0 g/dL Final   02/10/2020 3.8 3.5 - 5.0 g/dL Final     Creatinine   Date Value Ref Range Status   08/17/2020 0.88 0.70 - 1.30 mg/dL Final   05/18/2020 0.89 0.70 - 1.30 mg/dL Final   02/10/2020 0.96 0.70 - 1.30 mg/dL Final       WBC   Date Value Ref Range Status   08/17/2020 9.2 4.0 - 11.0 thou/uL Final   05/18/2020 8.0 4.0 - 11.0 thou/uL Final     Hemoglobin   Date Value Ref Range Status   08/17/2020 14.4 14.0 - 18.0 g/dL Final   05/18/2020 15.0 14.0 - 18.0 g/dL Final   02/10/2020 15.1 14.0 - 18.0 g/dL Final     Platelets   Date Value Ref Range Status   08/17/2020 231 140 - 440 thou/uL Final   05/18/2020 226 140 - 440 thou/uL Final   02/10/2020 265 140 - 440 thou/uL Final       Lab Results   Component Value Date    SEDRATE 15 04/17/2018

## 2024-02-12 ENCOUNTER — LAB (OUTPATIENT)
Dept: LAB | Facility: CLINIC | Age: 64
End: 2024-02-12
Payer: COMMERCIAL

## 2024-02-12 DIAGNOSIS — L40.50 PSORIATIC ARTHRITIS (H): ICD-10-CM

## 2024-02-12 LAB
ALBUMIN SERPL BCG-MCNC: 3.9 G/DL (ref 3.5–5.2)
ALT SERPL W P-5'-P-CCNC: 23 U/L (ref 0–70)
CREAT SERPL-MCNC: 1.26 MG/DL (ref 0.67–1.17)
EGFRCR SERPLBLD CKD-EPI 2021: 64 ML/MIN/1.73M2
ERYTHROCYTE [DISTWIDTH] IN BLOOD BY AUTOMATED COUNT: 13.1 % (ref 10–15)
HCT VFR BLD AUTO: 41.7 % (ref 40–53)
HGB BLD-MCNC: 13.6 G/DL (ref 13.3–17.7)
MCH RBC QN AUTO: 29.2 PG (ref 26.5–33)
MCHC RBC AUTO-ENTMCNC: 32.6 G/DL (ref 31.5–36.5)
MCV RBC AUTO: 90 FL (ref 78–100)
PLATELET # BLD AUTO: 268 10E3/UL (ref 150–450)
RBC # BLD AUTO: 4.66 10E6/UL (ref 4.4–5.9)
WBC # BLD AUTO: 6.5 10E3/UL (ref 4–11)

## 2024-02-12 PROCEDURE — 82040 ASSAY OF SERUM ALBUMIN: CPT

## 2024-02-12 PROCEDURE — 36415 COLL VENOUS BLD VENIPUNCTURE: CPT

## 2024-02-12 PROCEDURE — 85027 COMPLETE CBC AUTOMATED: CPT

## 2024-02-12 PROCEDURE — 84460 ALANINE AMINO (ALT) (SGPT): CPT

## 2024-02-12 PROCEDURE — 82565 ASSAY OF CREATININE: CPT

## 2024-02-15 ENCOUNTER — OFFICE VISIT (OUTPATIENT)
Dept: RHEUMATOLOGY | Facility: CLINIC | Age: 64
End: 2024-02-15
Payer: COMMERCIAL

## 2024-02-15 VITALS
SYSTOLIC BLOOD PRESSURE: 128 MMHG | BODY MASS INDEX: 29.75 KG/M2 | HEART RATE: 58 BPM | DIASTOLIC BLOOD PRESSURE: 76 MMHG | WEIGHT: 216.3 LBS | OXYGEN SATURATION: 96 %

## 2024-02-15 DIAGNOSIS — Z79.899 HIGH RISK MEDICATION USE: ICD-10-CM

## 2024-02-15 DIAGNOSIS — L40.50 PSORIATIC ARTHRITIS (H): Primary | ICD-10-CM

## 2024-02-15 DIAGNOSIS — M15.0 PRIMARY OSTEOARTHRITIS INVOLVING MULTIPLE JOINTS: ICD-10-CM

## 2024-02-15 DIAGNOSIS — L40.9 PSORIASIS: ICD-10-CM

## 2024-02-15 LAB
HBV SURFACE AG SERPL QL IA: NONREACTIVE
HCV AB SERPL QL IA: NONREACTIVE

## 2024-02-15 PROCEDURE — 87340 HEPATITIS B SURFACE AG IA: CPT | Performed by: INTERNAL MEDICINE

## 2024-02-15 PROCEDURE — 20604 DRAIN/INJ JOINT/BURSA W/US: CPT | Mod: LT | Performed by: INTERNAL MEDICINE

## 2024-02-15 PROCEDURE — 36415 COLL VENOUS BLD VENIPUNCTURE: CPT | Performed by: INTERNAL MEDICINE

## 2024-02-15 PROCEDURE — 86803 HEPATITIS C AB TEST: CPT | Performed by: INTERNAL MEDICINE

## 2024-02-15 PROCEDURE — 99214 OFFICE O/P EST MOD 30 MIN: CPT | Mod: 25 | Performed by: INTERNAL MEDICINE

## 2024-02-15 PROCEDURE — 86481 TB AG RESPONSE T-CELL SUSP: CPT | Performed by: INTERNAL MEDICINE

## 2024-02-15 RX ORDER — MEDROXYPROGESTERONE ACETATE 150 MG/ML
50 INJECTION, SUSPENSION INTRAMUSCULAR WEEKLY
Qty: 4 ML | Refills: 3 | Status: SHIPPED | OUTPATIENT
Start: 2024-02-15 | End: 2024-04-18

## 2024-02-15 RX ORDER — PREDNISONE 2.5 MG/1
TABLET ORAL
Qty: 90 TABLET | Refills: 2 | Status: SHIPPED | OUTPATIENT
Start: 2024-02-15 | End: 2024-05-14

## 2024-02-15 RX ORDER — TAMSULOSIN HYDROCHLORIDE 0.4 MG/1
0.4 CAPSULE ORAL DAILY
COMMUNITY
Start: 2024-02-07

## 2024-02-15 NOTE — PROGRESS NOTES
Rheumatology follow-up visit note     Kai is a 63 year old male presents today for follow-up.    Nir was seen today for recheck.    Diagnoses and all orders for this visit:    Psoriatic arthritis (H)  -     etanercept (ENBREL SURECLICK) 50 MG/ML autoinjector; Inject 50 mg Subcutaneous once a week for 30 days Hold for signs of infection, and seek medical attention.  -     triamcinolone acetonide (KENALOG-10) injection 10 mg  -     LA ARTHROCNT ASPIR&/INJ SMALL JT/BURSAW/US REC RPRT  -     predniSONE (DELTASONE) 2.5 MG tablet; 7.5 mg daily for 4 weeks, reduce by 2.5 mg every 4 weeks to 5 milligrams daily, and to 2.5 mg daily and stop    Psoriasis    Primary osteoarthritis involving multiple joints    High risk medication use  -     Quantiferon-TB Gold Plus; Future  -     Hepatitis B surface antigen; Future  -     Hepatitis C antibody; Future  -     Quantiferon-TB Gold Plus  -     Hepatitis B surface antigen  -     Hepatitis C antibody        Uncontrolled psoriatic arthritis the worst of his pain is in the left foot second metatarsophalangeal joint, as he gets Enbrel and check labs as noted, having had prednisone already, on leflunomide, would like to pursue local injection into the left second toe, which was done under ultrasound guidance after pros and cons were outlined with 10 mg of Kenalog into the left second metatarsophalangeal joint.  Meanwhile prednisone as noted.    Follow up in 2 months.    HPI    Kai Jim is a 63 year old male is here for follow-up of  psoriatic arthritis, osteoarthritis, psoriasis.  He is not doing as well as he has done in the past.  He has pain in his hands, left foot.  He has triggering of the right middle finger, the left hand middle finger metacarpophalangeal joint is painful and swollen.  His left toes are painful swollen.  He has taken a course of prednisone back in January for 2 weeks which helped but once he finished the course his symptoms of gotten worse.  Many  years ago he was on Enbrel that was discontinued because of insurance issues.  Subsequently he was on Otezla that did help his psoriasis quite a bit however it caused him GI side effects and was not able to continue.  He has several comorbidities as noted.  He rates his pain at 6.5/10.  This is in contrast to where he has been in the past such as 0.5/10.  He has  family history of rheumatoid arthritis in father.   He is not a smoker alcohol occasionally.   He injured his right index and middle finger chopping wood many years ago.  That was in 1986.    He injured his left hand and a chainsaw.  He works in construction, repairing storm drains, pavement etc.  DETAILED EXAMINATION  08/15/23  :      DETAILED EXAMINATION  02/15/24  :    Vitals:    02/15/24 0846   BP: 128/76   BP Location: Right arm   Patient Position: Sitting   Cuff Size: Adult Regular   Pulse: 58   SpO2: 96%   Weight: 98.1 kg (216 lb 4.8 oz)     Alert oriented. Head including the face is examined for malar rash, heliotropes, scarring, lupus pernio. Eyes examined for redness such as in episcleritis/scleritis, periorbital lesions.   Neck/ Face examined for parotid gland swelling, range of motion of neck.  Left upper and lower and right upper and lower extremities examined for tenderness, swelling, warmth of the appendicular joints, range of motion, edema, rash.  Some of the important findings included: He has synovitis of the metacarpophalangeal joint #3 on the left hand, he has triggering of the right middle finger with A1 nodularity which is tender, he has tenderness in the metatarsophalangeal joints of the left foot #2 3 and 4 with almost dactylitis of the left second toe.         Patient Active Problem List    Diagnosis Date Noted    Benign localized prostatic hyperplasia with lower urinary tract symptoms (LUTS) 05/12/2023     Priority: Medium    Calculus of kidney 05/12/2023     Priority: Medium    Chronic sinusitis, unspecified 05/12/2023      Priority: Medium    ED (erectile dysfunction) of organic origin 05/12/2023     Priority: Medium    Environmental allergies 05/12/2023     Priority: Medium    Hyperlipidemia 05/12/2023     Priority: Medium    Primary osteoarthritis of right knee 05/12/2023     Priority: Medium    Pure hypercholesterolemia 05/12/2023     Priority: Medium    Rheumatoid arthritis (H) 05/12/2023     Priority: Medium    S/p nephrectomy 05/12/2023     Priority: Medium    FH: total knee replacement 12/19/2022     Priority: Medium    Left inguinal hernia 11/13/2020     Priority: Medium     Formatting of this note might be different from the original.  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) 04/17/2018     Priority: Medium    Primary osteoarthritis involving multiple joints 04/17/2018     Priority: Medium    Psoriatic arthritis (H) 01/08/2016     Priority: Medium    CARDIOVASCULAR SCREENING; LDL GOAL LESS THAN 130 10/31/2010     Priority: Medium     Past Surgical History:   Procedure Laterality Date    CARPAL TUNNEL RELEASE RT/LT Bilateral     DAVINCI NEPHRECTOMY Right 04/06/2022    Procedure: NEPHRECTOMY, RADICAL, ROBOT-ASSISTED LAPAROSCOPIC;  Surgeon: Brad Luciano MD;  Location: [facility] OR    DAVINCI XI HERNIORRHAPHY INGUINAL Right 4/27/2023    Procedure: HERNIORRHAPHY, INGUINAL, ROBOT-ASSISTED, LAPAROSCOPIC, USING DA OBEY XI;  Surgeon: Jareth Julian MD;  Location: [facility] OR    DAVINCI XI HERNIORRHAPHY VENTRAL N/A 4/27/2023    Procedure: HERNIORRHAPHY, VENTRAL, ROBOT-ASSISTED, LAPAROSCOPIC, USING DA OBEY XI x 2;  Surgeon: Jareth Julian MD;  Location: [facility] OR    finger amputation surgery Right     Due to log splitter. x2 fingers    HERNIA REPAIR  01/01/1991    Right inguinal    LYSIS, ADHESIONS,  "ROBOT-ASSISTED, LAPAROSCOPIC, USING DA OBEY XI N/A 4/27/2023    Procedure: LYSIS, ADHESIONS, ROBOT-ASSISTED, LAPAROSCOPIC, USING DA OBEY XI;  Surgeon: Jareth Julian MD;  Location: [facility]    NOSE SURGERY      OK LAP,INGUINAL HERNIA REPR,INITIAL Left 12/17/2020    Procedure: HERNIORRHAPHY, INGUINAL, LAPAROSCOPIC;  Surgeon: Jareth Julian MD;  Location: [facility];  Service: General    VARICOCELE EXCISION        Past Medical History:   Diagnosis Date    Benign localized prostatic hyperplasia with lower urinary tract symptoms (LUTS) 5/12/2023    BPH (benign prostatic hyperplasia)     Calculus of kidney 5/12/2023    CARDIOVASCULAR SCREENING; LDL GOAL LESS THAN 130 10/31/2010    Chronic pain of right knee 3/16/2017    Chronic sinusitis, unspecified 5/12/2023    Degenerative joint disease of sacroiliac joint (H24) 4/17/2018    ED (erectile dysfunction)     ED (erectile dysfunction) of organic origin 5/12/2023    Environmental allergies 5/12/2023    Family history of kidney stones     FH: total knee replacement 12/19/2022    Gastroesophageal reflux disease     Hyperlipidemia     Hypertension     Increased frequency of urination 1/17/2019    Kidney mass     Kidney stone     Knee pain     right- \"bone on bone\"    Left inguinal hernia 11/13/2020    Formatting of this note might be different from the original. Added automatically from request for surgery [id number]    Motion sickness     Primary osteoarthritis involving multiple joints 4/17/2018    Primary osteoarthritis of right knee 5/12/2023    Psoriasis 2/13/2020    Psoriatic arthritis (H) 01/08/2016    Pure hypercholesterolemia 5/12/2023    RA (rheumatoid arthritis) (H)     psoriatic    Raised prostate specific antigen 2/26/2019    Rheumatoid arthritis (H) 5/12/2023    Right kidney mass 4/6/2022    S/p nephrectomy 5/12/2023    Seasonal allergies     Sinusitis     Squamous cell carcinoma of skin      No Known Allergies  Current Outpatient Medications "   Medication Sig Dispense Refill    fish oil-omega-3 fatty acids 1000 MG capsule Take 1 g by mouth daily      leflunomide (ARAVA) 20 MG tablet TAKE 1 TABLET(20 MG) BY MOUTH DAILY 90 tablet 0    metoprolol succinate ER (TOPROL XL) 25 MG 24 hr tablet Take 1 tablet (25 mg) by mouth daily 30 tablet 11    Multiple Vitamin (MULTI VITAMIN) TABS Take 1 tablet by mouth daily       omeprazole (PRILOSEC) 20 MG DR capsule omeprazole 20 mg capsule,delayed release   TAKE 1 CAPSULE BY MOUTH EVERY DAY 30 MINUTES BEFORE MORNING MEAL      sildenafil (VIAGRA) 50 MG tablet Take  mg by mouth daily as needed Stop date 9/7/2022      Vitamin D3 (CHOLECALCIFEROL) 125 MCG (5000 UT) tablet Take 1 tablet by mouth daily       family history includes Heart Disease in his father and mother.  Social Connections: Not on file          WBC Count   Date Value Ref Range Status   02/12/2024 6.5 4.0 - 11.0 10e3/uL Final     RBC Count   Date Value Ref Range Status   02/12/2024 4.66 4.40 - 5.90 10e6/uL Final     Hemoglobin   Date Value Ref Range Status   02/12/2024 13.6 13.3 - 17.7 g/dL Final     Hematocrit   Date Value Ref Range Status   02/12/2024 41.7 40.0 - 53.0 % Final     MCV   Date Value Ref Range Status   02/12/2024 90 78 - 100 fL Final     MCH   Date Value Ref Range Status   02/12/2024 29.2 26.5 - 33.0 pg Final     Platelet Count   Date Value Ref Range Status   02/12/2024 268 150 - 450 10e3/uL Final     % Lymphocytes   Date Value Ref Range Status   04/17/2018 20 20 - 40 % Final     ALT   Date Value Ref Range Status   02/12/2024 23 0 - 70 U/L Final     Albumin   Date Value Ref Range Status   02/12/2024 3.9 3.5 - 5.2 g/dL Final   05/11/2022 3.8 3.5 - 5.0 g/dL Final     Creatinine   Date Value Ref Range Status   02/12/2024 1.26 (H) 0.67 - 1.17 mg/dL Final     GFR Estimate   Date Value Ref Range Status   02/12/2024 64 >60 mL/min/1.73m2 Final   06/29/2021 >60 >60 mL/min/1.73m2 Final     GFR Estimate If Black   Date Value Ref Range Status    06/29/2021 >60 >60 mL/min/1.73m2 Final     Erythrocyte Sedimentation Rate   Date Value Ref Range Status   04/17/2018 15 0 - 15 mm/hr Final     CRP   Date Value Ref Range Status   04/17/2018 0.7 0.0 - 0.8 mg/dL Final

## 2024-02-16 LAB
QUANTIFERON MITOGEN: 10 IU/ML
QUANTIFERON NIL TUBE: 0 IU/ML
QUANTIFERON TB1 TUBE: 0.01 IU/ML
QUANTIFERON TB2 TUBE: 0.01

## 2024-02-17 LAB
GAMMA INTERFERON BACKGROUND BLD IA-ACNC: 0 IU/ML
M TB IFN-G BLD-IMP: NEGATIVE
M TB IFN-G CD4+ BCKGRND COR BLD-ACNC: 10 IU/ML
MITOGEN IGNF BCKGRD COR BLD-ACNC: 0.01 IU/ML
MITOGEN IGNF BCKGRD COR BLD-ACNC: 0.01 IU/ML

## 2024-02-23 ENCOUNTER — TELEPHONE (OUTPATIENT)
Dept: RHEUMATOLOGY | Facility: CLINIC | Age: 64
End: 2024-02-23
Payer: COMMERCIAL

## 2024-02-23 NOTE — TELEPHONE ENCOUNTER
PA Initiation    Medication: ENBREL SURECLICK 50 MG/ML SC SOAJ  Insurance Company: [employer] - Phone [phone] Fax [phone]  Pharmacy Filling the Rx: [facility] [place] - [place] - [address]  Filling Pharmacy Phone:    Filling Pharmacy Fax:    Start Date: 2/23/2024      Patient is locked out to using the REQUIRED THRU [facility]     Started PA over phone     ARMANDO Hernandez, [facility]  Specialty Pharmacy Clinic Liaison     [facility] Specialty    [email or web address]     Phone: [phone]  Fax: [phone]

## 2024-03-06 ENCOUNTER — TRANSFERRED RECORDS (OUTPATIENT)
Dept: HEALTH INFORMATION MANAGEMENT | Facility: CLINIC | Age: 64
End: 2024-03-06
Payer: COMMERCIAL

## 2024-03-14 ENCOUNTER — PATIENT OUTREACH (OUTPATIENT)
Dept: GASTROENTEROLOGY | Facility: CLINIC | Age: 64
End: 2024-03-14
Payer: COMMERCIAL

## 2024-03-18 ENCOUNTER — TELEPHONE (OUTPATIENT)
Dept: RHEUMATOLOGY | Facility: CLINIC | Age: 64
End: 2024-03-18
Payer: COMMERCIAL

## 2024-03-18 NOTE — TELEPHONE ENCOUNTER
Janel silva forms to plan             ARMANDO Hernandez, [facility]  Specialty Pharmacy Clinic Liaison     [facility]    [email or web address]     Phone: [phone]  Fax: [phone]

## 2024-03-18 NOTE — TELEPHONE ENCOUNTER
[facility] Call Center    Phone Message    May a detailed message be left on voicemail: no     Reason for Call: Medication Question or concern regarding medication   Prescription Clarification  Name of Medication: enbrel  Prescribing Provider: Dr. Huff   Pharmacy:   [facility] - [place] - [address]      What on the order needs clarification?     Pharmacy following up on Prior Auth sent 03/01/2024.       Action Taken: Other: Rheum    Travel Screening: Not Applicable

## 2024-03-18 NOTE — TELEPHONE ENCOUNTER
PA Initiation    Medication: ENBREL SURECLICK 50 MG/ML SC SOAJ  Insurance Company: [employer] - Phone [phone] Fax [phone]  Pharmacy Filling the Rx: [place] [address] [address]   Filling Pharmacy Phone:    Filling Pharmacy Fax:    Start Date: 3/18/2024  [id number])      ARMANDO Hernandez, [facility]  Specialty Pharmacy Clinic Liaison     [facility] Specialty    [email or web address]     Phone: [phone]  Fax: [phone]

## 2024-03-18 NOTE — TELEPHONE ENCOUNTER
Spoke with Marialuisa HARRIS rep- she states Iwona HARRIS is stating denied from [employer]. There is not PA approval letter on file.     Per 2/23/24 telephone encounter- Iwona HARRIS was approved 2/28/24-7/8/24.      [employer] Ph# [phone]

## 2024-03-25 NOTE — TELEPHONE ENCOUNTER
Prior Authorization Approval    Medication: ENBREL SURECLICK 50 MG/ML SC SOAJ  Authorization Effective Date: 3/25/2024  Authorization Expiration Date: 3/25/2035  Approved Dose/Quantity: 4mL  Reference #: [id number])   Insurance Company: [employer] - Phone [phone] Fax [phone]  Expected CoPay: $    CoPay Card Available: Yes    Financial Assistance Needed: NA  Which Pharmacy is filling the prescription: [facility] [place] [address] [address]       Called plan approved lifetime as long as pt has this plan must use [facility]        ARMANDO Hernandez, [facility]  Specialty Pharmacy Clinic Liaison     [facility] Special[facility]    [email or web address]     Phone: [phone]  Fax: [phone]

## 2024-04-11 DIAGNOSIS — L40.9 PSORIASIS: ICD-10-CM

## 2024-04-11 DIAGNOSIS — L40.50 PSORIATIC ARTHRITIS (H): ICD-10-CM

## 2024-04-11 RX ORDER — LEFLUNOMIDE 20 MG/1
20 TABLET ORAL DAILY
Qty: 90 TABLET | Refills: 0 | Status: SHIPPED | OUTPATIENT
Start: 2024-04-11 | End: 2024-05-14 | Stop reason: ALTCHOICE

## 2024-04-15 ENCOUNTER — TELEPHONE (OUTPATIENT)
Dept: RHEUMATOLOGY | Facility: CLINIC | Age: 64
End: 2024-04-15
Payer: COMMERCIAL

## 2024-04-15 NOTE — TELEPHONE ENCOUNTER
PA Initiation    Medication: ENBREL SURECLICK 50 MG/ML SC SOAJ  Insurance Company: [employer] - Phone [phone] Fax [phone]  Pharmacy Filling the Rx:    Filling Pharmacy Phone:    Filling Pharmacy Fax:    Start Date: 4/15/2024            Prior Authorization Approval    Medication: ENBREL SURECLICK 50 MG/ML SC SOAJ  Authorization Effective Date: 4/15/2024  Authorization Expiration Date: 4/15/2025  Approved Dose/Quantity: 4  Reference #:     Insurance Company: [employer] - Phone [phone] Fax [phone]  Expected CoPay: $    CoPay Card Available: No    Financial Assistance Needed: SIS

## 2024-04-18 DIAGNOSIS — L40.50 PSORIATIC ARTHRITIS (H): ICD-10-CM

## 2024-04-18 RX ORDER — MEDROXYPROGESTERONE ACETATE 150 MG/ML
50 INJECTION, SUSPENSION INTRAMUSCULAR WEEKLY
Qty: 4 ML | Refills: 3 | Status: SHIPPED | OUTPATIENT
Start: 2024-04-18 | End: 2024-07-22

## 2024-05-14 ENCOUNTER — OFFICE VISIT (OUTPATIENT)
Dept: RHEUMATOLOGY | Facility: CLINIC | Age: 64
End: 2024-05-14
Payer: COMMERCIAL

## 2024-05-14 VITALS
SYSTOLIC BLOOD PRESSURE: 130 MMHG | DIASTOLIC BLOOD PRESSURE: 78 MMHG | BODY MASS INDEX: 29.64 KG/M2 | WEIGHT: 215.5 LBS | HEART RATE: 60 BPM | OXYGEN SATURATION: 97 %

## 2024-05-14 DIAGNOSIS — L40.9 PSORIASIS: ICD-10-CM

## 2024-05-14 DIAGNOSIS — Z90.5 S/P NEPHRECTOMY: ICD-10-CM

## 2024-05-14 DIAGNOSIS — Z79.899 HIGH RISK MEDICATION USE: ICD-10-CM

## 2024-05-14 DIAGNOSIS — L40.50 PSORIATIC ARTHRITIS (H): Primary | ICD-10-CM

## 2024-05-14 DIAGNOSIS — M15.0 PRIMARY OSTEOARTHRITIS INVOLVING MULTIPLE JOINTS: ICD-10-CM

## 2024-05-14 PROCEDURE — G2211 COMPLEX E/M VISIT ADD ON: HCPCS | Performed by: INTERNAL MEDICINE

## 2024-05-14 PROCEDURE — 99214 OFFICE O/P EST MOD 30 MIN: CPT | Performed by: INTERNAL MEDICINE

## 2024-05-14 NOTE — PROGRESS NOTES
Rheumatology follow-up visit note     Kai is a 63 year old male presents today for follow-up.    Nir was seen today for recheck.    Diagnoses and all orders for this visit:    Psoriatic arthritis (H)    Psoriasis    Primary osteoarthritis involving multiple joints    High risk medication use    S/p nephrectomy        The longitudinal plan of care for the diagnosis(es)/condition(s) as documented were addressed during this visit. Due to the added complexity in care, I will continue to support Nir in the subsequent management and with ongoing continuity of care.     Follow up in 4 months.    HPI    Kai Jim is a 63 year old male is here for follow-up of  psoriatic arthritis, osteoarthritis, psoriasis.  Late seems that he has turned the corner.  He has had significant improvement since he started Enbrel evasive further.  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  He noted mild discomfort 0.5/10.  Able to do all his day-to-day activities.  Meanwhile he had developed triggering of his right index and middle finger which is quite a new phenomenon for him, these of the 2 digits which were amputated traumatically.  There is no residual pain in his toes.  He is no longer on leflunomide or prednisone.        Following is the excerpt from a previous note:     He is not doing as well as he has done in the past.  He has pain in his hands, left foot.  He has triggering of the right middle finger, the left hand middle finger metacarpophalangeal joint is painful and swollen.  His left toes are painful swollen.  He has taken a course of prednisone back in January for 2 weeks which helped but once he finished the course his symptoms of gotten worse.  Many years ago he was on Enbrel that was discontinued because of insurance issues.  Subsequently he was on Otezla that did help his psoriasis quite a bit however it caused him GI side effects and was not able to continue.  He has several comorbidities  as noted.  He rates his pain at 6.5/10.  This is in contrast to where he has been in the past such as 0.5/10.  He has  family history of rheumatoid arthritis in father.   He is not a smoker alcohol occasionally.   He injured his right index and middle finger chopping wood many years ago.  That was in 1986.    He injured his left hand and a chainsaw.  He works in construction, repairing storm drains, pavement etc.    DETAILED EXAMINATION  05/14/24  :    Vitals:    05/14/24 0915   BP: 130/78   BP Location: Right arm   Patient Position: Sitting   Cuff Size: Adult Large   Pulse: 60   SpO2: 97%   Weight: 97.8 kg (215 lb 8 oz)     Alert oriented. Head including the face is examined for malar rash, heliotropes, scarring, lupus pernio. Eyes examined for redness such as in episcleritis/scleritis, periorbital lesions.   Neck/ Face examined for parotid gland swelling, range of motion of neck.  Left upper and lower and right upper and lower extremities examined for tenderness, swelling, warmth of the appendicular joints, range of motion, edema, rash.  Some of the important findings included: he does not have evidence of synovitis in the palpable joints of the upper extremities.  No significant deformities of the digits amputation right second and, third digit distal to the PIPs..  no Heber[place] nodes.  Range of motion of the shoulders  show full abduction.  No JLT effusion or warmth of the knees.  he does not have dactylitis of the digits.     Patient Active Problem List    Diagnosis Date Noted    Benign localized prostatic hyperplasia with lower urinary tract symptoms (LUTS) 05/12/2023     Priority: Medium    Calculus of kidney 05/12/2023     Priority: Medium    Chronic sinusitis, unspecified 05/12/2023     Priority: Medium    ED (erectile dysfunction) of organic origin 05/12/2023     Priority: Medium    Environmental allergies 05/12/2023     Priority: Medium    Hyperlipidemia 05/12/2023     Priority: Medium    Primary  osteoarthritis of right knee 05/12/2023     Priority: Medium    Pure hypercholesterolemia 05/12/2023     Priority: Medium    Rheumatoid arthritis (H) 05/12/2023     Priority: Medium    S/p nephrectomy 05/12/2023     Priority: Medium    FH: total knee replacement 12/19/2022     Priority: Medium    Left inguinal hernia 11/13/2020     Priority: Medium     Formatting of this note might be different from the original.  Added automatically from request for surgery [id number]      Raised prostate specific antigen 02/26/2019     Priority: Medium    Increased frequency of urination 01/17/2019     Priority: Medium    Degenerative joint disease of sacroiliac joint (H24) 04/17/2018     Priority: Medium    Primary osteoarthritis involving multiple joints 04/17/2018     Priority: Medium    Psoriatic arthritis (H) 01/08/2016     Priority: Medium    CARDIOVASCULAR SCREENING; LDL GOAL LESS THAN 130 10/31/2010     Priority: Medium     Past Surgical History:   Procedure Laterality Date    CARPAL TUNNEL RELEASE RT/LT Bilateral     DAVINCI NEPHRECTOMY Right 04/06/2022    Procedure: NEPHRECTOMY, RADICAL, ROBOT-ASSISTED LAPAROSCOPIC;  Surgeon: Brad Luciano MD;  Location: [facility] OR    DAVINCI XI HERNIORRHAPHY INGUINAL Right 4/27/2023    Procedure: HERNIORRHAPHY, INGUINAL, ROBOT-ASSISTED, LAPAROSCOPIC, USING DA OBEY XI;  Surgeon: Jareth Julian MD;  Location: [facility] OR    DAVINCI XI HERNIORRHAPHY VENTRAL N/A 4/27/2023    Procedure: HERNIORRHAPHY, VENTRAL, ROBOT-ASSISTED, LAPAROSCOPIC, USING DA OBEY XI x 2;  Surgeon: Jareth Julian MD;  Location: [facility] OR    finger amputation surgery Right     Due to log splitter. x2 fingers    HERNIA REPAIR  01/01/1991    Right inguinal    LYSIS, ADHESIONS, ROBOT-ASSISTED, LAPAROSCOPIC, USING DA OBEY XI N/A 4/27/2023    Procedure: LYSIS, ADHESIONS, ROBOT-ASSISTED, LAPAROSCOPIC, USING DA OBEY XI;  Surgeon: Jareth Julian MD;  Location: [facility] OR    NOSE SURGERY       "FL LAP,INGUINAL HERNIA REPR,INITIAL Left 12/17/2020    Procedure: HERNIORRHAPHY, INGUINAL, LAPAROSCOPIC;  Surgeon: Jareth Julian MD;  Location: [facility];  Service: General    VARICOCELE EXCISION        Past Medical History:   Diagnosis Date    Benign localized prostatic hyperplasia with lower urinary tract symptoms (LUTS) 5/12/2023    BPH (benign prostatic hyperplasia)     Calculus of kidney 5/12/2023    CARDIOVASCULAR SCREENING; LDL GOAL LESS THAN 130 10/31/2010    Chronic pain of right knee 3/16/2017    Chronic sinusitis, unspecified 5/12/2023    Degenerative joint disease of sacroiliac joint (H24) 4/17/2018    ED (erectile dysfunction)     ED (erectile dysfunction) of organic origin 5/12/2023    Environmental allergies 5/12/2023    Family history of kidney stones     FH: total knee replacement 12/19/2022    Gastroesophageal reflux disease     Hyperlipidemia     Hypertension     Increased frequency of urination 1/17/2019    Kidney mass     Kidney stone     Knee pain     right- \"bone on bone\"    Left inguinal hernia 11/13/2020    Formatting of this note might be different from the original. Added automatically from request for surgery [id number]    Motion sickness     Primary osteoarthritis involving multiple joints 4/17/2018    Primary osteoarthritis of right knee 5/12/2023    Psoriasis 2/13/2020    Psoriatic arthritis (H) 01/08/2016    Pure hypercholesterolemia 5/12/2023    RA (rheumatoid arthritis) (H)     psoriatic    Raised prostate specific antigen 2/26/2019    Rheumatoid arthritis (H) 5/12/2023    Right kidney mass 4/6/2022    S/p nephrectomy 5/12/2023    Seasonal allergies     Sinusitis     Squamous cell carcinoma of skin      No Known Allergies  Current Outpatient Medications   Medication Sig Dispense Refill    etanercept (ENBREL SURECLICK) 50 MG/ML autoinjector Inject 50 mg Subcutaneous once a week Hold for signs of infection, and seek medical attention. 4 mL 3    leflunomide (ARAVA) 20 MG " tablet TAKE 1 TABLET(20 MG) BY MOUTH DAILY 90 tablet 0    metoprolol succinate ER (TOPROL XL) 25 MG 24 hr tablet Take 1 tablet (25 mg) by mouth daily 30 tablet 11    omeprazole (PRILOSEC) 20 MG DR capsule omeprazole 20 mg capsule,delayed release   TAKE 1 CAPSULE BY MOUTH EVERY DAY 30 MINUTES BEFORE MORNING MEAL      predniSONE (DELTASONE) 2.5 MG tablet 7.5 mg daily for 4 weeks, reduce by 2.5 mg every 4 weeks to 5 milligrams daily, and to 2.5 mg daily and stop 90 tablet 2    sildenafil (VIAGRA) 50 MG tablet Take  mg by mouth daily as needed Stop date 9/7/2022      tamsulosin (FLOMAX) 0.4 MG capsule Take 0.4 mg by mouth daily      Vitamin D3 (CHOLECALCIFEROL) 125 MCG (5000 UT) tablet Take 1 tablet by mouth daily       family history includes Heart Disease in his father and mother.  Social Connections: Not on file          WBC Count   Date Value Ref Range Status   02/12/2024 6.5 4.0 - 11.0 10e3/uL Final     RBC Count   Date Value Ref Range Status   02/12/2024 4.66 4.40 - 5.90 10e6/uL Final     Hemoglobin   Date Value Ref Range Status   02/12/2024 13.6 13.3 - 17.7 g/dL Final     Hematocrit   Date Value Ref Range Status   02/12/2024 41.7 40.0 - 53.0 % Final     MCV   Date Value Ref Range Status   02/12/2024 90 78 - 100 fL Final     MCH   Date Value Ref Range Status   02/12/2024 29.2 26.5 - 33.0 pg Final     Platelet Count   Date Value Ref Range Status   02/12/2024 268 150 - 450 10e3/uL Final     % Lymphocytes   Date Value Ref Range Status   04/17/2018 20 20 - 40 % Final     ALT   Date Value Ref Range Status   02/12/2024 23 0 - 70 U/L Final     Albumin   Date Value Ref Range Status   02/12/2024 3.9 3.5 - 5.2 g/dL Final   05/11/2022 3.8 3.5 - 5.0 g/dL Final     Creatinine   Date Value Ref Range Status   02/12/2024 1.26 (H) 0.67 - 1.17 mg/dL Final     GFR Estimate   Date Value Ref Range Status   02/12/2024 64 >60 mL/min/1.73m2 Final   06/29/2021 >60 >60 mL/min/1.73m2 Final     GFR Estimate If Black   Date Value Ref  Range Status   06/29/2021 >60 >60 mL/min/1.73m2 Final     Erythrocyte Sedimentation Rate   Date Value Ref Range Status   04/17/2018 15 0 - 15 mm/hr Final     CRP   Date Value Ref Range Status   04/17/2018 0.7 0.0 - 0.8 mg/dL Final

## 2024-05-15 ENCOUNTER — TRANSFERRED RECORDS (OUTPATIENT)
Dept: HEALTH INFORMATION MANAGEMENT | Facility: CLINIC | Age: 64
End: 2024-05-15
Payer: COMMERCIAL

## 2024-05-27 ENCOUNTER — OFFICE VISIT (OUTPATIENT)
Dept: FAMILY MEDICINE | Facility: CLINIC | Age: 64
End: 2024-05-27
Payer: COMMERCIAL

## 2024-05-27 VITALS
WEIGHT: 215 LBS | OXYGEN SATURATION: 99 % | BODY MASS INDEX: 29.57 KG/M2 | RESPIRATION RATE: 16 BRPM | SYSTOLIC BLOOD PRESSURE: 133 MMHG | HEART RATE: 75 BPM | DIASTOLIC BLOOD PRESSURE: 88 MMHG | TEMPERATURE: 98.1 F

## 2024-05-27 DIAGNOSIS — H00.012 HORDEOLUM EXTERNUM OF RIGHT LOWER EYELID: Primary | ICD-10-CM

## 2024-05-27 PROCEDURE — 99212 OFFICE O/P EST SF 10 MIN: CPT | Performed by: FAMILY MEDICINE

## 2024-05-27 NOTE — PROGRESS NOTES
Assessment:     Hordeolum externum of right lower eyelid       Plan:     Patient with small stye of his right lower eyelid.  Recommend warm compresses to the eye several times daily.  Discussed typical course of healing with conservative measures at home.  Follow-up if symptoms getting worse or not improving in 2 weeks      Subjective:       63 year old male presents for evaluation of a couple day history of swelling redness and tenderness on the edge of his right lower eyelid.  5 days ago he was trimming with a weed kathryn and got a piece of grass in his eye which he thinks he completely removed and his eye has been feeling fine until couple days ago when he started having the pain on his leg.  He has no further foreign body sensation in his eye.  There is been no drainage in his eye.  No visual changes.    Patient Active Problem List   Diagnosis    CARDIOVASCULAR SCREENING; LDL GOAL LESS THAN 130    Benign localized prostatic hyperplasia with lower urinary tract symptoms (LUTS)    Calculus of kidney    Chronic sinusitis, unspecified    Degenerative joint disease of sacroiliac joint (H24)    ED (erectile dysfunction) of organic origin    Environmental allergies    FH: total knee replacement    Hyperlipidemia    Increased frequency of urination    Left inguinal hernia    Primary osteoarthritis involving multiple joints    Primary osteoarthritis of right knee    Psoriatic arthritis (H)    Pure hypercholesterolemia    Raised prostate specific antigen    Rheumatoid arthritis (H)    S/p nephrectomy       Past Medical History:   Diagnosis Date    Benign localized prostatic hyperplasia with lower urinary tract symptoms (LUTS) 5/12/2023    BPH (benign prostatic hyperplasia)     Calculus of kidney 5/12/2023    CARDIOVASCULAR SCREENING; LDL GOAL LESS THAN 130 10/31/2010    Chronic pain of right knee 3/16/2017    Chronic sinusitis, unspecified 5/12/2023    Degenerative joint disease of sacroiliac joint (H24) 4/17/2018    ED  "(erectile dysfunction)     ED (erectile dysfunction) of organic origin 5/12/2023    Environmental allergies 5/12/2023    Family history of kidney stones     FH: total knee replacement 12/19/2022    Gastroesophageal reflux disease     Hyperlipidemia     Hypertension     Increased frequency of urination 1/17/2019    Kidney mass     Kidney stone     Knee pain     right- \"bone on bone\"    Left inguinal hernia 11/13/2020    Formatting of this note might be different from the original. Added automatically from request for surgery [id number]    Motion sickness     Primary osteoarthritis involving multiple joints 4/17/2018    Primary osteoarthritis of right knee 5/12/2023    Psoriasis 2/13/2020    Psoriatic arthritis (H) 01/08/2016    Pure hypercholesterolemia 5/12/2023    RA (rheumatoid arthritis) (H)     psoriatic    Raised prostate specific antigen 2/26/2019    Rheumatoid arthritis (H) 5/12/2023    Right kidney mass 4/6/2022    S/p nephrectomy 5/12/2023    Seasonal allergies     Sinusitis     Squamous cell carcinoma of skin        Past Surgical History:   Procedure Laterality Date    CARPAL TUNNEL RELEASE RT/LT Bilateral     DAVINCI NEPHRECTOMY Right 04/06/2022    Procedure: NEPHRECTOMY, RADICAL, ROBOT-ASSISTED LAPAROSCOPIC;  Surgeon: Brad Luciano MD;  [place] [facility] [place]    DAVINCI XI HERNIORRHAPHY INGUINAL Right 4/27/2023    Procedure: HERNIORRHAPHY, INGUINAL, ROBOT-ASSISTED, LAPAROSCOPIC, USING DA OBEY XI;  Surgeon: Jareth Julian MD;  [place] [facility] [place]    DAVINCI XI HERNIORRHAPHY VENTRAL N/A 4/27/2023    Procedure: HERNIORRHAPHY, VENTRAL, ROBOT-ASSISTED, LAPAROSCOPIC, USING DA OBEY XI x 2;  Surgeon: Jareth Julian MD;  [place] [facility] [place]    finger amputation surgery Right     Due to log splitter. x2 fingers    HERNIA REPAIR  01/01/1991    Right inguinal    LYSIS, ADHESIONS, ROBOT-ASSISTED, LAPAROSCOPIC, USING DA OBEY XI N/A 4/27/2023    Procedure: LYSIS, ADHESIONS, ROBOT-ASSISTED, " LAPAROSCOPIC, USING DA OBEY XI;  Surgeon: Jareth Julian MD;  Location: [facility] [place]    NOSE SURGERY      WA LAP,INGUINAL HERNIA REPR,INITIAL Left 2020    Procedure: HERNIORRHAPHY, INGUINAL, LAPAROSCOPIC;  Surgeon: Jareth Julian MD;  Location: [facility];  Service: General    VARICOCELE EXCISION         Current Outpatient Medications   Medication Sig Dispense Refill    etanercept (ENBREL SURECLICK) 50 MG/ML autoinjector Inject 50 mg Subcutaneous once a week Hold for signs of infection, and seek medical attention. 4 mL 3    metoprolol succinate ER (TOPROL XL) 25 MG 24 hr tablet Take 1 tablet (25 mg) by mouth daily 30 tablet 11    omeprazole (PRILOSEC) 20 MG DR capsule omeprazole 20 mg capsule,delayed release   TAKE 1 CAPSULE BY MOUTH EVERY DAY 30 MINUTES BEFORE MORNING MEAL      sildenafil (VIAGRA) 50 MG tablet Take  mg by mouth daily as needed Stop date 2022      tamsulosin (FLOMAX) 0.4 MG capsule Take 0.4 mg by mouth daily      Vitamin D3 (CHOLECALCIFEROL) 125 MCG (5000 UT) tablet Take 1 tablet by mouth daily       No current facility-administered medications for this visit.       No Known Allergies    Family History   Problem Relation Age of Onset    Heart Disease Mother     Heart Disease Father        Social History     Socioeconomic History    Marital status:      Spouse name: None    Number of children: None    Years of education: None    Highest education level: None   Tobacco Use    Smoking status: Former     Types: Cigars     Quit date: 3/12/2019     Years since quittin.2    Smokeless tobacco: Never   Vaping Use    Vaping status: Never Used   Substance and Sexual Activity    Alcohol use: Yes     Comment: occasional    Drug use: Not Currently     Social Determinants of Health     Financial Resource Strain: Low Risk  (2023)    Financial Resource Strain     Within the past 12 months, have you or your family members you live with been unable to get utilities  (heat, electricity) when it was really needed?: No   Food Insecurity: High Risk (9/26/2023)    Food Insecurity     Within the past 12 months, did you worry that your food would run out before you got money to buy more?: No     Within the past 12 months, did the food you bought just not last and you didn t have money to get more?: Yes   Transportation Needs: Low Risk  (9/26/2023)    Transportation Needs     Within the past 12 months, has lack of transportation kept you from medical appointments, getting your medicines, non-medical meetings or appointments, work, or from getting things that you need?: No   Interpersonal Safety: Low Risk  (9/26/2023)    Interpersonal Safety     Do you feel physically and emotionally safe where you currently live?: Yes     Within the past 12 months, have you been hit, slapped, kicked or otherwise physically hurt by someone?: No     Within the past 12 months, have you been humiliated or emotionally abused in other ways by your partner or ex-partner?: No   Housing Stability: Low Risk  (9/26/2023)    Housing Stability     Do you have housing? : Yes     Are you worried about losing your housing?: No         Review of Systems  As per HPI.    Objective:     /88 (BP Location: Right arm, Patient Position: Sitting, Cuff Size: Adult Regular)   Pulse 75   Temp 98.1  F (36.7  C) (Oral)   Resp 16   Wt 97.5 kg (215 lb)   SpO2 99%   BMI 29.57 kg/m       General appearance: alert, appears stated age, and cooperative  Eyes: Patient with small external stye on the lower left lid.  Eye exam otherwise unremarkable.  There is no drainage.  No foreign body seen.        This note has been dictated using voice recognition software. Any grammatical or context distortions are unintentional and inherent to the software

## 2024-06-05 ENCOUNTER — OFFICE VISIT (OUTPATIENT)
Dept: CARDIOLOGY | Facility: CLINIC | Age: 64
End: 2024-06-05
Payer: COMMERCIAL

## 2024-06-05 VITALS
HEART RATE: 74 BPM | RESPIRATION RATE: 16 BRPM | SYSTOLIC BLOOD PRESSURE: 126 MMHG | BODY MASS INDEX: 29.02 KG/M2 | DIASTOLIC BLOOD PRESSURE: 81 MMHG | WEIGHT: 211 LBS

## 2024-06-05 DIAGNOSIS — I10 PRIMARY HYPERTENSION: Primary | ICD-10-CM

## 2024-06-05 DIAGNOSIS — I77.819 AORTIC DILATATION (H): ICD-10-CM

## 2024-06-05 PROCEDURE — 99214 OFFICE O/P EST MOD 30 MIN: CPT | Performed by: INTERNAL MEDICINE

## 2024-06-05 RX ORDER — METOPROLOL SUCCINATE 25 MG/1
25 TABLET, EXTENDED RELEASE ORAL DAILY
Qty: 90 TABLET | Refills: 3 | Status: SHIPPED | OUTPATIENT
Start: 2024-06-05

## 2024-06-05 NOTE — PROGRESS NOTES
[facility]   [address]  [place] [address]   [email or web address]   OFFICE: [phone]     CARDIOLOGY CLINIC NOTE     Thank you, Carlos Lyn, for asking the [facility] team to see Mr. Kai Jim to  Follow Up         Assessment/Recommendations   Assessment:    Non-aneurysmal dilation of the ascending aorta - measures 4.4 cm by CT report.  Hypertension - blood pressure controlled.  Renal cell carcinoma s/p R nephrectomy - currently in remission but has CT scans every 6 months for surveillance.    Plan:  Continue medications without changes. Renewed metoprolol for a year.  Encouraged regular activity  Follow up in a year with an echo prior.         History of Present Illness   Mr. Kai Jim is a 63 year old male with a significant past history of renal cell carcinoma s/p R nephrectomy who presents for evaluation of a dilated aorta.    Mr. Jim had a CT c/a/p to evaluate follow-up of renal cell cancer, s/p nephrectomy. This study incidentally noted mild non-aneurysmal dilation of the ascending aorta measuring 4.4 cm. TTE suggested an ascending aorta diameter of 4.2 cm.    Nir is feeling well today. 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 No cardiorespiratory symptoms. BP is reasonably controlled.    Other than noted above, Mr. Jim denies any chest pain/pressure/tightness, shortness of breath at rest or with exertion, light headedness/dizziness, pre-syncope, syncope, lower extremity swelling, palpitations, paroxysmal nocturnal dyspnea (PND), or orthopnea.     Cardiac Problems and Cardiac Diagnostics     Most Recent Cardiac testing:    TTE 12/14/23  Left ventricular size, wall motion and function are normal. The ejection fraction is 55-60%.  Normal right ventricle size and systolic function.  Ascending Aorta dilatation is present(4.2 cm).  No hemodynamically  significant valvular abnormalities on 2D or color flow imaging.       Medications  Allergies   Current Outpatient Medications   Medication Sig Dispense Refill    etanercept (ENBREL SURECLICK) 50 MG/ML autoinjector Inject 50 mg Subcutaneous once a week Hold for signs of infection, and seek medical attention. 4 mL 3    metoprolol succinate ER (TOPROL XL) 25 MG 24 hr tablet Take 1 tablet (25 mg) by mouth daily 30 tablet 11    omeprazole (PRILOSEC) 20 MG DR capsule omeprazole 20 mg capsule,delayed release   TAKE 1 CAPSULE BY MOUTH EVERY DAY 30 MINUTES BEFORE MORNING MEAL      sildenafil (VIAGRA) 50 MG tablet Take  mg by mouth daily as needed Stop date 9/7/2022      tamsulosin (FLOMAX) 0.4 MG capsule Take 0.4 mg by mouth daily      Vitamin D3 (CHOLECALCIFEROL) 125 MCG (5000 UT) tablet Take 1 tablet by mouth daily        No Known Allergies     Physical Examination Review of Systems   Vitals: /81 (BP Location: Left arm, Patient Position: Sitting, Cuff Size: Adult Large)   Pulse 74   Resp 16   Wt 95.7 kg (211 lb)   BMI 29.02 kg/m    BMI= Body mass index is 29.02 kg/m .  Wt Readings from Last 3 Encounters:   06/05/24 95.7 kg (211 lb)   05/27/24 97.5 kg (215 lb)   05/14/24 97.8 kg (215 lb 8 oz)       General: pleasant male. No acute distress.   HENT: external ears normal. Nares patent. Mucous membranes moist.  Eyes: perrla, extraocular muscles intact. No scleral icterus.   Neck: No JVD  Lungs: clear to auscultation  COR: regular rate and rhythm, No murmurs, rubs, or gallops  Abd: nondistended, soft  Extrem: No edema        Please refer above for cardiac ROS details.       Past History   Past Medical History:   Past Medical History:   Diagnosis Date    Benign localized prostatic hyperplasia with lower urinary tract symptoms (LUTS) 5/12/2023    BPH (benign prostatic hyperplasia)     Calculus of kidney 5/12/2023    CARDIOVASCULAR SCREENING; LDL GOAL LESS THAN 130 10/31/2010    Chronic pain of right knee 3/16/2017  "   Chronic sinusitis, unspecified 5/12/2023    Degenerative joint disease of sacroiliac joint (H24) 4/17/2018    ED (erectile dysfunction)     ED (erectile dysfunction) of organic origin 5/12/2023    Environmental allergies 5/12/2023    Family history of kidney stones     FH: total knee replacement 12/19/2022    Gastroesophageal reflux disease     Hyperlipidemia     Hypertension     Increased frequency of urination 1/17/2019    Kidney mass     Kidney stone     Knee pain     right- \"bone on bone\"    Left inguinal hernia 11/13/2020    Formatting of this note might be different from the original. Added automatically from request for surgery [id number]    Motion sickness     Primary osteoarthritis involving multiple joints 4/17/2018    Primary osteoarthritis of right knee 5/12/2023    Psoriasis 2/13/2020    Psoriatic arthritis (H) 01/08/2016    Pure hypercholesterolemia 5/12/2023    RA (rheumatoid arthritis) (H)     psoriatic    Raised prostate specific antigen 2/26/2019    Rheumatoid arthritis (H) 5/12/2023    Right kidney mass 4/6/2022    S/p nephrectomy 5/12/2023    Seasonal allergies     Sinusitis     Squamous cell carcinoma of skin         Past Surgical History:   Past Surgical History:   Procedure Laterality Date    CARPAL TUNNEL RELEASE RT/LT Bilateral     DAVINCI NEPHRECTOMY Right 04/06/2022    Procedure: NEPHRECTOMY, RADICAL, ROBOT-ASSISTED LAPAROSCOPIC;  Surgeon: Brad Luciano MD;  Location: [facility] OR    DAVINCI XI HERNIORRHAPHY INGUINAL Right 4/27/2023    Procedure: HERNIORRHAPHY, INGUINAL, ROBOT-ASSISTED, LAPAROSCOPIC, USING DA OBEY XI;  Surgeon: Jareth Julina MD;  Location: [facility] OR    DAVINCI XI HERNIORRHAPHY VENTRAL N/A 4/27/2023    Procedure: HERNIORRHAPHY, VENTRAL, ROBOT-ASSISTED, LAPAROSCOPIC, USING DA OBEY XI x 2;  Surgeon: Jareth Julian MD;  Location: [facility] OR    finger amputation surgery Right     Due to log splitter. x2 fingers    HERNIA REPAIR  01/01/1991    Right " inguinal    LYSIS, ADHESIONS, ROBOT-ASSISTED, LAPAROSCOPIC, USING DA OBEY XI N/A 2023    Procedure: LYSIS, ADHESIONS, ROBOT-ASSISTED, LAPAROSCOPIC, USING DA OBEY XI;  Surgeon: Jareth Julian MD;  Location: [facility]    NOSE SURGERY      AL LAP,INGUINAL HERNIA REPR,INITIAL Left 2020    Procedure: HERNIORRHAPHY, INGUINAL, LAPAROSCOPIC;  Surgeon: Jareth Julian MD;  [facility]: [facility];  Service: General    VARICOCELE EXCISION          Family History:   Family History   Problem Relation Age of Onset    Heart Disease Mother     Heart Disease Father         Social History:   Social History     Socioeconomic History    Marital status:      Spouse name: Not on file    Number of children: Not on file    Years of education: Not on file    Highest education level: Not on file   Occupational History    Not on file   Tobacco Use    Smoking status: Former     Types: Cigars     Quit date: 3/12/2019     Years since quittin.2    Smokeless tobacco: Never   Vaping Use    Vaping status: Never Used   Substance and Sexual Activity    Alcohol use: Yes     Comment: occasional    Drug use: Not Currently    Sexual activity: Not on file   Other Topics Concern    Parent/sibling w/ CABG, MI or angioplasty before 65F 55M? Not Asked   Social History Narrative    Not on file     Social Determinants of Health     Financial Resource Strain: Low Risk  (2023)    Financial Resource Strain     Within the past 12 months, have you or your family members you live with been unable to get utilities (heat, electricity) when it was really needed?: No   Food Insecurity: High Risk (2023)    Food Insecurity     Within the past 12 months, did you worry that your food would run out before you got money to buy more?: No     Within the past 12 months, did the food you bought just not last and you didn t have money to get more?: Yes   Transportation Needs: Low Risk  (2023)    Transportation Needs      "Within the past 12 months, has lack of transportation kept you from medical appointments, getting your medicines, non-medical meetings or appointments, work, or from getting things that you need?: No   Physical Activity: Not on file   Stress: Not on file   Social Connections: Not on file   Interpersonal Safety: Low Risk  (9/26/2023)    Interpersonal Safety     Do you feel physically and emotionally safe where you currently live?: Yes     Within the past 12 months, have you been hit, slapped, kicked or otherwise physically hurt by someone?: No     Within the past 12 months, have you been humiliated or emotionally abused in other ways by your partner or ex-partner?: No   Housing Stability: Low Risk  (9/26/2023)    Housing Stability     Do you have housing? : Yes     Are you worried about losing your housing?: No            Lab Results    Chemistry/lipid CBC Cardiac Enzymes/BNP/TSH/INR   Lab Results   Component Value Date    CHOL 215 (H) 09/26/2023    HDL 43 09/26/2023    TRIG 105 09/26/2023    BUN 20.0 04/06/2023     04/06/2023    CO2 24 04/06/2023    Lab Results   Component Value Date    WBC 6.5 02/12/2024    HGB 13.6 02/12/2024    HCT 41.7 02/12/2024    MCV 90 02/12/2024     02/12/2024    No results found for: \"CKTOTAL\", \"CKMB\", \"TROPONINI\", \"BNP\", \"TSH\", \"INR\"         "

## 2024-06-05 NOTE — LETTER
6/5/2024    Carlos Bhatt PA-C  [address], [address]  [place] [address]    RE: Kai Jim       Dear Colleague,     I had the pleasure of seeing Kai Jim in the [facility].    [facility]   [address]  [place] [address]   [email or web address]   OFFICE: [phone]     CARDIOLOGY CLINIC NOTE     Thank you, Dr. Bhatt, Carlos Bronson, for asking the [facility] team to see Mr. Kai Jim to  Follow Up         Assessment/Recommendations   Assessment:    Non-aneurysmal dilation of the ascending aorta - measures 4.4 cm by CT report.  Hypertension - blood pressure controlled.  Renal cell carcinoma s/p R nephrectomy - currently in remission but has CT scans every 6 months for surveillance.    Plan:  Continue medications without changes. Renewed metoprolol for a year.  Encouraged regular activity  Follow up in a year with an echo prior.         History of Present Illness   Mr. Kai Jim is a 63 year old male with a significant past history of renal cell carcinoma s/p R nephrectomy who presents for evaluation of a dilated aorta.    Mr. Jim had a CT c/a/p to evaluate follow-up of renal cell cancer, s/p nephrectomy. This study incidentally noted mild non-aneurysmal dilation of the ascending aorta measuring 4.4 cm. TTE suggested an ascending aorta diameter of 4.2 cm.    Nir is feeling well today. 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 No cardiorespiratory symptoms. BP is reasonably controlled.    Other than noted above, Mr. Jim denies any chest pain/pressure/tightness, shortness of breath at rest or with exertion, light headedness/dizziness, pre-syncope, syncope, lower extremity swelling, palpitations, paroxysmal nocturnal dyspnea (PND), or orthopnea.     Cardiac Problems and Cardiac Diagnostics     Most Recent Cardiac testing:    TTE  12/14/23  Left ventricular size, wall motion and function are normal. The ejection fraction is 55-60%.  Normal right ventricle size and systolic function.  Ascending Aorta dilatation is present(4.2 cm).  No hemodynamically significant valvular abnormalities on 2D or color flow imaging.       Medications  Allergies   Current Outpatient Medications   Medication Sig Dispense Refill    etanercept (ENBREL SURECLICK) 50 MG/ML autoinjector Inject 50 mg Subcutaneous once a week Hold for signs of infection, and seek medical attention. 4 mL 3    metoprolol succinate ER (TOPROL XL) 25 MG 24 hr tablet Take 1 tablet (25 mg) by mouth daily 30 tablet 11    omeprazole (PRILOSEC) 20 MG DR capsule omeprazole 20 mg capsule,delayed release   TAKE 1 CAPSULE BY MOUTH EVERY DAY 30 MINUTES BEFORE MORNING MEAL      sildenafil (VIAGRA) 50 MG tablet Take  mg by mouth daily as needed Stop date 9/7/2022      tamsulosin (FLOMAX) 0.4 MG capsule Take 0.4 mg by mouth daily      Vitamin D3 (CHOLECALCIFEROL) 125 MCG (5000 UT) tablet Take 1 tablet by mouth daily        No Known Allergies     Physical Examination Review of Systems   Vitals: /81 (BP Location: Left arm, Patient Position: Sitting, Cuff Size: Adult Large)   Pulse 74   Resp 16   Wt 95.7 kg (211 lb)   BMI 29.02 kg/m    BMI= Body mass index is 29.02 kg/m .  Wt Readings from Last 3 Encounters:   06/05/24 95.7 kg (211 lb)   05/27/24 97.5 kg (215 lb)   05/14/24 97.8 kg (215 lb 8 oz)       General: pleasant male. No acute distress.   HENT: external ears normal. Nares patent. Mucous membranes moist.  Eyes: perrla, extraocular muscles intact. No scleral icterus.   Neck: No JVD  Lungs: clear to auscultation  COR: regular rate and rhythm, No murmurs, rubs, or gallops  Abd: nondistended, soft  Extrem: No edema        Please refer above for cardiac ROS details.       Past History   Past Medical History:   Past Medical History:   Diagnosis Date    Benign localized prostatic hyperplasia  "with lower urinary tract symptoms (LUTS) 5/12/2023    BPH (benign prostatic hyperplasia)     Calculus of kidney 5/12/2023    CARDIOVASCULAR SCREENING; LDL GOAL LESS THAN 130 10/31/2010    Chronic pain of right knee 3/16/2017    Chronic sinusitis, unspecified 5/12/2023    Degenerative joint disease of sacroiliac joint (H24) 4/17/2018    ED (erectile dysfunction)     ED (erectile dysfunction) of organic origin 5/12/2023    Environmental allergies 5/12/2023    Family history of kidney stones     FH: total knee replacement 12/19/2022    Gastroesophageal reflux disease     Hyperlipidemia     Hypertension     Increased frequency of urination 1/17/2019    Kidney mass     Kidney stone     Knee pain     right- \"bone on bone\"    Left inguinal hernia 11/13/2020    Formatting of this note might be different from the original. Added automatically from request for surgery [id number]    Motion sickness     Primary osteoarthritis involving multiple joints 4/17/2018    Primary osteoarthritis of right knee 5/12/2023    Psoriasis 2/13/2020    Psoriatic arthritis (H) 01/08/2016    Pure hypercholesterolemia 5/12/2023    RA (rheumatoid arthritis) (H)     psoriatic    Raised prostate specific antigen 2/26/2019    Rheumatoid arthritis (H) 5/12/2023    Right kidney mass 4/6/2022    S/p nephrectomy 5/12/2023    Seasonal allergies     Sinusitis     Squamous cell carcinoma of skin         Past Surgical History:   Past Surgical History:   Procedure Laterality Date    CARPAL TUNNEL RELEASE RT/LT Bilateral     DAVINCI NEPHRECTOMY Right 04/06/2022    Procedure: NEPHRECTOMY, RADICAL, ROBOT-ASSISTED LAPAROSCOPIC;  Surgeon: Brad Luciano MD;  Location: [facility] [place] OR    [facility] XI HERNIORRHAPHY INGUINAL Right 4/27/2023    Procedure: HERNIORRHAPHY, INGUINAL, ROBOT-ASSISTED, LAPAROSCOPIC, USING DA OBEY XI;  Surgeon: Jareth Julian MD;  Location: [facility] [place] OR    DAVINCI XI HERNIORRHAPHY VENTRAL N/A 4/27/2023    Procedure: HERNIORRHAPHY, VENTRAL, " ROBOT-ASSISTED, LAPAROSCOPIC, USING DA OBEY XI x 2;  Surgeon: Jareth Julian MD;  Location: [facility] [place]    finger amputation surgery Right     Due to log splitter. x2 fingers    HERNIA REPAIR  1991    Right inguinal    LYSIS, ADHESIONS, ROBOT-ASSISTED, LAPAROSCOPIC, USING DA OBEY XI N/A 2023    Procedure: LYSIS, ADHESIONS, ROBOT-ASSISTED, LAPAROSCOPIC, USING DA OBEY XI;  Surgeon: Jareth Julian MD;  Location: [facility] OR    NOSE SURGERY      [place] LAP,INGUINAL HERNIA REPR,INITIAL Left 2020    Procedure: HERNIORRHAPHY, INGUINAL, LAPAROSCOPIC;  Surgeon: Jareth Julian MD;  Location: [facility] [place] [facility];  Service: General    VARICOCELE EXCISION          Family History:   Family History   Problem Relation Age of Onset    Heart Disease Mother     Heart Disease Father         Social History:   Social History     Socioeconomic History    Marital status:      Spouse name: Not on file    Number of children: Not on file    Years of education: Not on file    Highest education level: Not on file   Occupational History    Not on file   Tobacco Use    Smoking status: Former     Types: Cigars     Quit date: 3/12/2019     Years since quittin.2    Smokeless tobacco: Never   Vaping Use    Vaping status: Never Used   Substance and Sexual Activity    Alcohol use: Yes     Comment: occasional    Drug use: Not Currently    Sexual activity: Not on file   Other Topics Concern    Parent/sibling w/ CABG, MI or angioplasty before 65F 55M? Not Asked   Social History Narrative    Not on file     Social Determinants of Health     Financial Resource Strain: Low Risk  (2023)    Financial Resource Strain     Within the past 12 months, have you or your family members you live with been unable to get utilities (heat, electricity) when it was really needed?: No   Food Insecurity: High Risk (2023)    Food Insecurity     Within the past 12 months, did you worry that your food would run out before  "you got money to buy more?: No     Within the past 12 months, did the food you bought just not last and you didn t have money to get more?: Yes   Transportation Needs: Low Risk  (9/26/2023)    Transportation Needs     Within the past 12 months, has lack of transportation kept you from medical appointments, getting your medicines, non-medical meetings or appointments, work, or from getting things that you need?: No   Physical Activity: Not on file   Stress: Not on file   Social Connections: Not on file   Interpersonal Safety: Low Risk  (9/26/2023)    Interpersonal Safety     Do you feel physically and emotionally safe where you currently live?: Yes     Within the past 12 months, have you been hit, slapped, kicked or otherwise physically hurt by someone?: No     Within the past 12 months, have you been humiliated or emotionally abused in other ways by your partner or ex-partner?: No   Housing Stability: Low Risk  (9/26/2023)    Housing Stability     Do you have housing? : Yes     Are you worried about losing your housing?: No            Lab Results    Chemistry/lipid CBC Cardiac Enzymes/BNP/TSH/INR   Lab Results   Component Value Date    CHOL 215 (H) 09/26/2023    HDL 43 09/26/2023    TRIG 105 09/26/2023    BUN 20.0 04/06/2023     04/06/2023    CO2 24 04/06/2023    Lab Results   Component Value Date    WBC 6.5 02/12/2024    HGB 13.6 02/12/2024    HCT 41.7 02/12/2024    MCV 90 02/12/2024     02/12/2024    No results found for: \"CKTOTAL\", \"CKMB\", \"TROPONINI\", \"BNP\", \"TSH\", \"INR\"             Thank you for allowing me to participate in the care of your patient.      Sincerely,     Alon Oliver MD     [facility]  cc:   No referring provider defined for this encounter.      "

## 2024-06-05 NOTE — PATIENT INSTRUCTIONS
It was a pleasure to meet with you today.      Below is a summary of your visit.   Continue your medications without changes.  Continue active lifestyle  Follow up in a year with an echo prior.    We will call you to inform you of your test or procedure results within 3 business days of the test being performed.  If you do not hear from our office with the test results within 1 week please do not hesitate to call asking for these results.     Please do not hesitate to call the [facility] with any questions or concerns at [phone]. You can also reach my nurse, Nini, at [phone].    Sincerely,

## 2024-06-06 ENCOUNTER — HOSPITAL ENCOUNTER (OUTPATIENT)
Dept: RADIOLOGY | Facility: CLINIC | Age: 64
Discharge: HOME | End: 2024-06-06
Attending: INTERNAL MEDICINE | Admitting: INTERNAL MEDICINE
Payer: COMMERCIAL

## 2024-06-06 DIAGNOSIS — K59.89 VISCERAL HYPERSENSITIVITY SYNDROME: ICD-10-CM

## 2024-06-06 PROCEDURE — 74250 X-RAY XM SM INT 1CNTRST STD: CPT

## 2024-06-07 ENCOUNTER — TRANSFERRED RECORDS (OUTPATIENT)
Dept: HEALTH INFORMATION MANAGEMENT | Facility: CLINIC | Age: 64
End: 2024-06-07
Payer: COMMERCIAL

## 2024-06-17 NOTE — TELEPHONE ENCOUNTER
Prior Authorization Approval    Medication: ENBREL SURECLICK 50 MG/ML SC SOAJ  Authorization Effective Date: 2/28/2024  Authorization Expiration Date: 7/8/2024  Approved Dose/Quantity: 4mL  Reference #:     Insurance Company: [employer] - Phone [phone] Fax [phone]  Expected CoPay: $    CoPay Card Available: No    Financial Assistance Needed: NA  Which Pharmacy is filling the prescription: [facility] [place] - [id number]         Thankyou for the opportunity to care of you today.  Please take all medications as directed, continue any previous prescribed medications unless we specifically discussed holding them.  If your symptoms do not resolve or worsen please return to the clinic for re-evaluation, if your situation becomes emergent please present to to the nearest emergency department.  Follow-up with your PCP for continued evaluation and management.    R.I.C.E. therapy.  Rest, ice, compress, elevate.  For the next 24-48 hours please continue rest, intermittently use ice packs and continue to wear your Ace wrap, maintain elevation to the limb.

## 2024-07-02 ENCOUNTER — TRANSFERRED RECORDS (OUTPATIENT)
Dept: HEALTH INFORMATION MANAGEMENT | Facility: CLINIC | Age: 64
End: 2024-07-02
Payer: COMMERCIAL

## 2024-07-22 DIAGNOSIS — L40.50 PSORIATIC ARTHRITIS (H): ICD-10-CM

## 2024-07-22 NOTE — TELEPHONE ENCOUNTER
[facility] [place]    [facility]    LAST LAB 2/15/24  NEXT LAB 9/9/24    NEXT OV 9/12/24

## 2024-07-23 RX ORDER — MEDROXYPROGESTERONE ACETATE 150 MG/ML
50 INJECTION, SUSPENSION INTRAMUSCULAR WEEKLY
Qty: 4 ML | Refills: 0 | Status: SHIPPED | OUTPATIENT
Start: 2024-07-23 | End: 2024-08-14

## 2024-08-12 NOTE — TELEPHONE ENCOUNTER
[facility] is requesting the rx be sent to the [facility]ty [facility] instead due to insurance. Thank you.

## 2024-08-14 DIAGNOSIS — L40.50 PSORIATIC ARTHRITIS (H): ICD-10-CM

## 2024-08-14 NOTE — TELEPHONE ENCOUNTER
[facility]     Enbrel    50mg/ml     Inject I pen under skin every 7 days. Hold signs of infection and seek medical attention.    Last lab 9/9/24  Last ov 9/12/24

## 2024-08-16 NOTE — TELEPHONE ENCOUNTER
[facility] only [facility] for [facility].

## 2024-08-16 NOTE — TELEPHONE ENCOUNTER
Aide called to verify if the clinic received the prescription request. Aide states they are wanting to get a call back to know if patient is needing to have an appt before able to fill the prescription. Please advise.

## 2024-08-21 ENCOUNTER — LAB (OUTPATIENT)
Dept: LAB | Facility: CLINIC | Age: 64
End: 2024-08-21
Payer: COMMERCIAL

## 2024-08-21 DIAGNOSIS — L40.50 PSORIATIC ARTHRITIS (H): ICD-10-CM

## 2024-08-21 LAB
ALBUMIN SERPL BCG-MCNC: 4.2 G/DL (ref 3.5–5.2)
ALT SERPL W P-5'-P-CCNC: 16 U/L (ref 0–70)
CREAT SERPL-MCNC: 1.44 MG/DL (ref 0.67–1.17)
EGFRCR SERPLBLD CKD-EPI 2021: 54 ML/MIN/1.73M2
ERYTHROCYTE [DISTWIDTH] IN BLOOD BY AUTOMATED COUNT: 13.1 % (ref 10–15)
HCT VFR BLD AUTO: 42.2 % (ref 40–53)
HGB BLD-MCNC: 13.9 G/DL (ref 13.3–17.7)
MCH RBC QN AUTO: 29.9 PG (ref 26.5–33)
MCHC RBC AUTO-ENTMCNC: 32.9 G/DL (ref 31.5–36.5)
MCV RBC AUTO: 91 FL (ref 78–100)
PLATELET # BLD AUTO: 226 10E3/UL (ref 150–450)
RBC # BLD AUTO: 4.65 10E6/UL (ref 4.4–5.9)
WBC # BLD AUTO: 5.6 10E3/UL (ref 4–11)

## 2024-08-21 PROCEDURE — 82565 ASSAY OF CREATININE: CPT

## 2024-08-21 PROCEDURE — 85027 COMPLETE CBC AUTOMATED: CPT

## 2024-08-21 PROCEDURE — 36415 COLL VENOUS BLD VENIPUNCTURE: CPT

## 2024-08-21 PROCEDURE — 84460 ALANINE AMINO (ALT) (SGPT): CPT

## 2024-08-21 PROCEDURE — 82040 ASSAY OF SERUM ALBUMIN: CPT

## 2024-08-22 RX ORDER — MEDROXYPROGESTERONE ACETATE 150 MG/ML
50 INJECTION, SUSPENSION INTRAMUSCULAR WEEKLY
Qty: 4 ML | Refills: 0 | Status: SHIPPED | OUTPATIENT
Start: 2024-08-22 | End: 2024-09-12

## 2024-08-28 ENCOUNTER — TRANSFERRED RECORDS (OUTPATIENT)
Dept: HEALTH INFORMATION MANAGEMENT | Facility: CLINIC | Age: 64
End: 2024-08-28
Payer: COMMERCIAL

## 2024-09-08 ENCOUNTER — OFFICE VISIT (OUTPATIENT)
Dept: FAMILY MEDICINE | Facility: CLINIC | Age: 64
End: 2024-09-08
Payer: COMMERCIAL

## 2024-09-08 VITALS
SYSTOLIC BLOOD PRESSURE: 145 MMHG | OXYGEN SATURATION: 98 % | DIASTOLIC BLOOD PRESSURE: 85 MMHG | BODY MASS INDEX: 29.22 KG/M2 | WEIGHT: 212.5 LBS | TEMPERATURE: 97.7 F | RESPIRATION RATE: 20 BRPM | HEART RATE: 60 BPM

## 2024-09-08 DIAGNOSIS — L23.7 CONTACT DERMATITIS DUE TO POISON IVY: Primary | ICD-10-CM

## 2024-09-08 PROCEDURE — 99213 OFFICE O/P EST LOW 20 MIN: CPT | Performed by: FAMILY MEDICINE

## 2024-09-08 RX ORDER — PREDNISONE 20 MG/1
TABLET ORAL
Qty: 20 TABLET | Refills: 0 | Status: SHIPPED | OUTPATIENT
Start: 2024-09-08 | End: 2024-09-30

## 2024-09-08 NOTE — PROGRESS NOTES
Assessment:       Contact dermatitis due to poison ivy    - predniSONE (DELTASONE) 20 MG tablet  Dispense: 20 tablet; Refill: 0         Plan:     Patient with rash consistent with poison ivy.  Will treat with burst and taper of prednisone.  Follow-up if symptoms getting worse or not improving in 5 days.    MEDICATIONS:   Orders Placed This Encounter   Medications    predniSONE (DELTASONE) 20 MG tablet     Sig: Take 3 tabs by mouth daily x 3 days, then 2 tabs daily x 3 days, then 1 tab daily x 3 days, then 1/2 tab daily x 3 days.     Dispense:  20 tablet     Refill:  0       Subjective:       64 year old male presents for evaluation of a 2 to 3-day history of spreading vesicular intensely itchy rash on his arms groin and abdomen.  He came in contact with poison ivy 3 days ago.  He suspects this is likely what is causing the rash.  He is tried topical hydrocortisone cream without much relief of his symptoms.    Patient Active Problem List   Diagnosis    CARDIOVASCULAR SCREENING; LDL GOAL LESS THAN 130    Benign localized prostatic hyperplasia with lower urinary tract symptoms (LUTS)    Calculus of kidney    Chronic sinusitis, unspecified    Degenerative joint disease of sacroiliac joint (H24)    ED (erectile dysfunction) of organic origin    Environmental allergies    FH: total knee replacement    Hyperlipidemia    Increased frequency of urination    Left inguinal hernia    Primary osteoarthritis involving multiple joints    Primary osteoarthritis of right knee    Psoriatic arthritis (H)    Pure hypercholesterolemia    Raised prostate specific antigen    Rheumatoid arthritis (H)    S/p nephrectomy       Past Medical History:   Diagnosis Date    Benign localized prostatic hyperplasia with lower urinary tract symptoms (LUTS) 5/12/2023    BPH (benign prostatic hyperplasia)     Calculus of kidney 5/12/2023    CARDIOVASCULAR SCREENING; LDL GOAL LESS THAN 130 10/31/2010    Chronic pain of right knee 3/16/2017    Chronic  "sinusitis, unspecified 5/12/2023    Degenerative joint disease of sacroiliac joint (H24) 4/17/2018    ED (erectile dysfunction)     ED (erectile dysfunction) of organic origin 5/12/2023    Environmental allergies 5/12/2023    Family history of kidney stones     FH: total knee replacement 12/19/2022    Gastroesophageal reflux disease     Hyperlipidemia     Hypertension     Increased frequency of urination 1/17/2019    Kidney mass     Kidney stone     Knee pain     right- \"bone on bone\"    Left inguinal hernia 11/13/2020    Formatting of this note might be different from the original. Added automatically from request for surgery [id number]    Motion sickness     Primary osteoarthritis involving multiple joints 4/17/2018    Primary osteoarthritis of right knee 5/12/2023    Psoriasis 2/13/2020    Psoriatic arthritis (H) 01/08/2016    Pure hypercholesterolemia 5/12/2023    RA (rheumatoid arthritis) (H)     psoriatic    Raised prostate specific antigen 2/26/2019    Rheumatoid arthritis (H) 5/12/2023    Right kidney mass 4/6/2022    S/p nephrectomy 5/12/2023    Seasonal allergies     Sinusitis     Squamous cell carcinoma of skin        Past Surgical History:   Procedure Laterality Date    CARPAL TUNNEL RELEASE RT/LT Bilateral     DAVINCI NEPHRECTOMY Right 04/06/2022    Procedure: NEPHRECTOMY, RADICAL, ROBOT-ASSISTED LAPAROSCOPIC;  Surgeon: Brad Luciano MD;  Location: [facility] [place] OR    DAVINC XI HERNIORRHAPHY INGUINAL Right 4/27/2023    Procedure: HERNIORRHAPHY, INGUINAL, ROBOT-ASSISTED, LAPAROSCOPIC, USING DA OBEY XI;  Surgeon: Jareth Julian MD;  Location: [facility] [place] [facility]    DAVINCI XI HERNIORRHAPHY VENTRAL N/A 4/27/2023    Procedure: HERNIORRHAPHY, VENTRAL, ROBOT-ASSISTED, LAPAROSCOPIC, USING DA OBEY XI x 2;  Surgeon: Jareth Julian MD;  Location: [facility] [place] OR    finger amputation surgery Right     Due to log splitter. x2 fingers    HERNIA REPAIR  01/01/1991    Right inguinal    LYSIS, ADHESIONS, " ROBOT-ASSISTED, LAPAROSCOPIC, USING DA OBEY XI N/A 2023    Procedure: LYSIS, ADHESIONS, ROBOT-ASSISTED, LAPAROSCOPIC, USING DA OBEY XI;  Surgeon: Jareth Julain MD;  Location: [facility]    NOSE SURGERY      AL LAP,INGUINAL HERNIA REPR,INITIAL Left 2020    Procedure: HERNIORRHAPHY, INGUINAL, LAPAROSCOPIC;  Surgeon: Jareth Julian MD;  Location: [facility];  Service: General    VARICOCELE EXCISION         Current Outpatient Medications   Medication Sig Dispense Refill    etanercept (ENBREL SURECLICK) 50 MG/ML autoinjector Inject 50 mg subcutaneously once a week. Hold for signs of infection, and seek medical attention. 4 mL 0    metoprolol succinate ER (TOPROL XL) 25 MG 24 hr tablet Take 1 tablet (25 mg) by mouth daily 90 tablet 3    omeprazole (PRILOSEC) 20 MG DR capsule omeprazole 20 mg capsule,delayed release   TAKE 1 CAPSULE BY MOUTH EVERY DAY 30 MINUTES BEFORE MORNING MEAL      predniSONE (DELTASONE) 20 MG tablet Take 3 tabs by mouth daily x 3 days, then 2 tabs daily x 3 days, then 1 tab daily x 3 days, then 1/2 tab daily x 3 days. 20 tablet 0    sildenafil (VIAGRA) 50 MG tablet Take  mg by mouth daily as needed Stop date 2022      tamsulosin (FLOMAX) 0.4 MG capsule Take 0.4 mg by mouth daily      Vitamin D3 (CHOLECALCIFEROL) 125 MCG (5000 UT) tablet Take 1 tablet by mouth daily       No current facility-administered medications for this visit.       No Known Allergies    Family History   Problem Relation Age of Onset    Heart Disease Mother     Heart Disease Father        Social History     Socioeconomic History    Marital status:    Tobacco Use    Smoking status: Former     Types: Cigars     Quit date: 3/12/2019     Years since quittin.4    Smokeless tobacco: Never   Vaping Use    Vaping status: Never Used   Substance and Sexual Activity    Alcohol use: Yes     Comment: occasional    Drug use: Not Currently     Social Determinants of Health     Financial  Resource Strain: Low Risk  (9/26/2023)    Financial Resource Strain     Within the past 12 months, have you or your family members you live with been unable to get utilities (heat, electricity) when it was really needed?: No   Food Insecurity: High Risk (9/26/2023)    Food Insecurity     Within the past 12 months, did you worry that your food would run out before you got money to buy more?: No     Within the past 12 months, did the food you bought just not last and you didn t have money to get more?: Yes   Transportation Needs: Low Risk  (9/26/2023)    Transportation Needs     Within the past 12 months, has lack of transportation kept you from medical appointments, getting your medicines, non-medical meetings or appointments, work, or from getting things that you need?: No   Interpersonal Safety: Low Risk  (9/26/2023)    Interpersonal Safety     Do you feel physically and emotionally safe where you currently live?: Yes     Within the past 12 months, have you been hit, slapped, kicked or otherwise physically hurt by someone?: No     Within the past 12 months, have you been humiliated or emotionally abused in other ways by your partner or ex-partner?: No   Housing Stability: Low Risk  (9/26/2023)    Housing Stability     Do you have housing? : Yes     Are you worried about losing your housing?: No         Review of Systems  Pertinent items are noted in HPI.      Objective:     BP (!) 145/85 (BP Location: Right arm, Patient Position: Sitting, Cuff Size: Adult Large)   Pulse 60   Temp 97.7  F (36.5  C) (Oral)   Resp 20   Wt 96.4 kg (212 lb 8 oz)   SpO2 98%   BMI 29.22 kg/m       General appearance: alert, appears stated age, and cooperative  Skin: Patient with vesicular rash on an erythematous base on his abdomen groin          This note has been dictated using voice recognition software. Any grammatical or context distortions are unintentional and inherent to the software

## 2024-09-12 ENCOUNTER — OFFICE VISIT (OUTPATIENT)
Dept: RHEUMATOLOGY | Facility: CLINIC | Age: 64
End: 2024-09-12
Payer: COMMERCIAL

## 2024-09-12 VITALS
DIASTOLIC BLOOD PRESSURE: 84 MMHG | SYSTOLIC BLOOD PRESSURE: 142 MMHG | BODY MASS INDEX: 29.53 KG/M2 | HEART RATE: 60 BPM | WEIGHT: 214.7 LBS | OXYGEN SATURATION: 98 %

## 2024-09-12 DIAGNOSIS — L40.9 PSORIASIS: ICD-10-CM

## 2024-09-12 DIAGNOSIS — L40.50 PSORIATIC ARTHRITIS (H): Primary | ICD-10-CM

## 2024-09-12 DIAGNOSIS — Z79.899 HIGH RISK MEDICATION USE: ICD-10-CM

## 2024-09-12 DIAGNOSIS — Z90.5 S/P NEPHRECTOMY: ICD-10-CM

## 2024-09-12 DIAGNOSIS — M15.0 PRIMARY OSTEOARTHRITIS INVOLVING MULTIPLE JOINTS: ICD-10-CM

## 2024-09-12 PROCEDURE — G2211 COMPLEX E/M VISIT ADD ON: HCPCS | Performed by: INTERNAL MEDICINE

## 2024-09-12 PROCEDURE — 99214 OFFICE O/P EST MOD 30 MIN: CPT | Performed by: INTERNAL MEDICINE

## 2024-09-12 RX ORDER — MEDROXYPROGESTERONE ACETATE 150 MG/ML
50 INJECTION, SUSPENSION INTRAMUSCULAR WEEKLY
Qty: 4 ML | Refills: 5 | Status: SHIPPED | OUTPATIENT
Start: 2024-09-12 | End: 2024-10-12

## 2024-09-12 RX ORDER — IMIPRAMINE HCL 25 MG
25 TABLET ORAL EVERY MORNING
COMMUNITY
Start: 2024-06-04

## 2024-09-12 NOTE — PROGRESS NOTES
Rheumatology follow-up visit note     Kai is a 64 year old male presents today for follow-up.    Nir was seen today for recheck.    Diagnoses and all orders for this visit:    Psoriatic arthritis (H)  -     etanercept (ENBREL SURECLICK) 50 MG/ML autoinjector; Inject 50 mg subcutaneously once a week. Hold for signs of infection, and seek medical attention.    Psoriasis    Primary osteoarthritis involving multiple joints    High risk medication use    S/p nephrectomy         The longitudinal plan of care for the diagnosis(es)/condition(s) as documented were addressed during this visit. Due to the added complexity in care, I will continue to support Nir in the subsequent management and with ongoing continuity of care.      Follow up in 6 months.    HPI    Kai Jim is a 64 year old male is here for follow-up of psoriatic arthritis, osteoarthritis, psoriasis.  Late seems that he has turned the corner.  He is doing great.  He noted pain level of 0.  He is currently on prednisone however that is for a cutaneous eruption thought to be secondary to poison ivy.  Prior to starting prednisone his pain level was just the same.  He has noted no difficulty doing his day-to-day activities, no stiffness in the morning no swelling in the joints.  He was concerned about related to the most recent labs creatinine being 1.44 we reviewed the data so far back in 2022 he also had similar numbers his creatinine has fluctuated within a close range as low as 1.25 to as high as 1.45.  We discussed why this might have happened.  As 1 may recall he is status post nephrectomy for renal cell carcinoma.  At 1 point in the past he was on leflunomide.    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 Head including the face is examined for malar rash, heliotropes, scarring, lupus pernio. Eyes examined for redness such as in episcleritis/scleritis, periorbital lesions.   Neck/ Face examined for parotid gland swelling, range of motion of neck.  Left upper and lower and right upper and lower extremities examined for tenderness, swelling, warmth of the appendicular joints, range of motion, edema, rash.  Some of the important findings included: he does not have evidence of synovitis in the palpable joints of the upper extremities.  At the PIP. no Heberden nodes.  Range of motion of the shoulders   show full abduction.  No JLT effusion or warmth of the knees.  he does not have dactylitis of the digits.     Patient Active Problem List    Diagnosis Date Noted    Benign localized prostatic hyperplasia with lower urinary tract symptoms (LUTS) 05/12/2023     Priority: Medium    Calculus of kidney 05/12/2023     Priority: Medium    Chronic sinusitis, unspecified 05/12/2023     Priority: Medium    ED (erectile dysfunction) of organic origin 05/12/2023     Priority: Medium    Environmental allergies 05/12/2023     Priority: Medium    Hyperlipidemia 05/12/2023     Priority: Medium    Primary osteoarthritis of right knee 05/12/2023     Priority: Medium    Pure hypercholesterolemia 05/12/2023     Priority: Medium    Rheumatoid arthritis (H) 05/12/2023     Priority: Medium    S/p nephrectomy 05/12/2023     Priority: Medium    FH: total knee replacement 12/19/2022     Priority: Medium    Left inguinal hernia 11/13/2020     Priority: Medium     Formatting of this note might be different from the original.  Added automatically from request for surgery [id number]      Raised prostate specific antigen 02/26/2019     Priority: Medium    Increased frequency of urination 01/17/2019     Priority: Medium    Degenerative joint disease of sacroiliac joint (H24) 04/17/2018     Priority: Medium    Primary osteoarthritis involving  multiple joints 04/17/2018     Priority: Medium    Psoriatic arthritis (H) 01/08/2016     Priority: Medium    CARDIOVASCULAR SCREENING; LDL GOAL LESS THAN 130 10/31/2010     Priority: Medium     Past Surgical History:   Procedure Laterality Date    CARPAL TUNNEL RELEASE RT/LT Bilateral     DAVINCI NEPHRECTOMY Right 04/06/2022    Procedure: NEPHRECTOMY, RADICAL, ROBOT-ASSISTED LAPAROSCOPIC;  Surgeon: Brad Luciano MD;  Location: [facility] OR    DAVINCI XI HERNIORRHAPHY INGUINAL Right 4/27/2023    Procedure: HERNIORRHAPHY, INGUINAL, ROBOT-ASSISTED, LAPAROSCOPIC, USING DA OBEY XI;  Surgeon: Jareth Julian MD;  Location: [facility] OR    DAVINCI XI HERNIORRHAPHY VENTRAL N/A 4/27/2023    Procedure: HERNIORRHAPHY, VENTRAL, ROBOT-ASSISTED, LAPAROSCOPIC, USING DA OBEY XI x 2;  Surgeon: Jareth Julian MD;  Location: [facility] OR    finger amputation surgery Right     Due to log splitter. x2 fingers    HERNIA REPAIR  01/01/1991    Right inguinal    LYSIS, ADHESIONS, ROBOT-ASSISTED, LAPAROSCOPIC, USING DA OBEY XI N/A 4/27/2023    Procedure: LYSIS, ADHESIONS, ROBOT-ASSISTED, LAPAROSCOPIC, USING DA OBEY XI;  Surgeon: Jareth Julian MD;  Location: [facility] OR    NOSE SURGERY      [place] LAP,INGUINAL HERNIA REPR,INITIAL Left 12/17/2020    Procedure: HERNIORRHAPHY, INGUINAL, LAPAROSCOPIC;  Surgeon: Jareth Julian MD;  Location: [facility];  Service: General    VARICOCELE EXCISION        Past Medical History:   Diagnosis Date    Benign localized prostatic hyperplasia with lower urinary tract symptoms (LUTS) 5/12/2023    BPH (benign prostatic hyperplasia)     Calculus of kidney 5/12/2023    CARDIOVASCULAR SCREENING; LDL GOAL LESS THAN 130 10/31/2010    Chronic pain of right knee 3/16/2017    Chronic sinusitis, unspecified 5/12/2023    Degenerative joint disease of sacroiliac joint (H24) 4/17/2018    ED (erectile dysfunction)     ED (erectile dysfunction) of organic origin 5/12/2023     "Environmental allergies 5/12/2023    Family history of kidney stones     FH: total knee replacement 12/19/2022    Gastroesophageal reflux disease     Hyperlipidemia     Hypertension     Increased frequency of urination 1/17/2019    Kidney mass     Kidney stone     Knee pain     right- \"bone on bone\"    Left inguinal hernia 11/13/2020    Formatting of this note might be different from the original. Added automatically from request for surgery [id number]    Motion sickness     Primary osteoarthritis involving multiple joints 4/17/2018    Primary osteoarthritis of right knee 5/12/2023    Psoriasis 2/13/2020    Psoriatic arthritis (H) 01/08/2016    Pure hypercholesterolemia 5/12/2023    RA (rheumatoid arthritis) (H)     psoriatic    Raised prostate specific antigen 2/26/2019    Rheumatoid arthritis (H) 5/12/2023    Right kidney mass 4/6/2022    S/p nephrectomy 5/12/2023    Seasonal allergies     Sinusitis     Squamous cell carcinoma of skin      No Known Allergies  Current Outpatient Medications   Medication Sig Dispense Refill    etanercept (ENBREL SURECLICK) 50 MG/ML autoinjector Inject 50 mg subcutaneously once a week. Hold for signs of infection, and seek medical attention. 4 mL 0    metoprolol succinate ER (TOPROL XL) 25 MG 24 hr tablet Take 1 tablet (25 mg) by mouth daily 90 tablet 3    omeprazole (PRILOSEC) 20 MG DR capsule omeprazole 20 mg capsule,delayed release   TAKE 1 CAPSULE BY MOUTH EVERY DAY 30 MINUTES BEFORE MORNING MEAL      predniSONE (DELTASONE) 20 MG tablet Take 3 tabs by mouth daily x 3 days, then 2 tabs daily x 3 days, then 1 tab daily x 3 days, then 1/2 tab daily x 3 days. 20 tablet 0    sildenafil (VIAGRA) 50 MG tablet Take  mg by mouth daily as needed Stop date 9/7/2022      tamsulosin (FLOMAX) 0.4 MG capsule Take 0.4 mg by mouth daily      Vitamin D3 (CHOLECALCIFEROL) 125 MCG (5000 UT) tablet Take 1 tablet by mouth daily       family history includes Heart Disease in his father and " mother.  Social Connections: Not on file          WBC Count   Date Value Ref Range Status   08/21/2024 5.6 4.0 - 11.0 10e3/uL Final     RBC Count   Date Value Ref Range Status   08/21/2024 4.65 4.40 - 5.90 10e6/uL Final     Hemoglobin   Date Value Ref Range Status   08/21/2024 13.9 13.3 - 17.7 g/dL Final     Hematocrit   Date Value Ref Range Status   08/21/2024 42.2 40.0 - 53.0 % Final     MCV   Date Value Ref Range Status   08/21/2024 91 78 - 100 fL Final     MCH   Date Value Ref Range Status   08/21/2024 29.9 26.5 - 33.0 pg Final     Platelet Count   Date Value Ref Range Status   08/21/2024 226 150 - 450 10e3/uL Final     % Lymphocytes   Date Value Ref Range Status   04/17/2018 20 20 - 40 % Final     ALT   Date Value Ref Range Status   08/21/2024 16 0 - 70 U/L Final     Albumin   Date Value Ref Range Status   08/21/2024 4.2 3.5 - 5.2 g/dL Final   05/11/2022 3.8 3.5 - 5.0 g/dL Final     Creatinine   Date Value Ref Range Status   08/21/2024 1.44 (H) 0.67 - 1.17 mg/dL Final     GFR Estimate   Date Value Ref Range Status   08/21/2024 54 (L) >60 mL/min/1.73m2 Final     Comment:     eGFR calculated using 2021 CKD-EPI equation.   06/29/2021 >60 >60 mL/min/1.73m2 Final     GFR Estimate If Black   Date Value Ref Range Status   06/29/2021 >60 >60 mL/min/1.73m2 Final     Erythrocyte Sedimentation Rate   Date Value Ref Range Status   04/17/2018 15 0 - 15 mm/hr Final     CRP   Date Value Ref Range Status   04/17/2018 0.7 0.0 - 0.8 mg/dL Final

## 2024-09-27 SDOH — HEALTH STABILITY: PHYSICAL HEALTH: ON AVERAGE, HOW MANY DAYS PER WEEK DO YOU ENGAGE IN MODERATE TO STRENUOUS EXERCISE (LIKE A BRISK WALK)?: 4 DAYS

## 2024-09-27 SDOH — HEALTH STABILITY: PHYSICAL HEALTH: ON AVERAGE, HOW MANY MINUTES DO YOU ENGAGE IN EXERCISE AT THIS LEVEL?: 60 MIN

## 2024-09-27 ASSESSMENT — SOCIAL DETERMINANTS OF HEALTH (SDOH): HOW OFTEN DO YOU GET TOGETHER WITH FRIENDS OR RELATIVES?: THREE TIMES A WEEK

## 2024-09-30 ENCOUNTER — OFFICE VISIT (OUTPATIENT)
Dept: FAMILY MEDICINE | Facility: CLINIC | Age: 64
End: 2024-09-30
Attending: PHYSICIAN ASSISTANT
Payer: COMMERCIAL

## 2024-09-30 VITALS
HEART RATE: 66 BPM | RESPIRATION RATE: 21 BRPM | WEIGHT: 219 LBS | BODY MASS INDEX: 29.66 KG/M2 | OXYGEN SATURATION: 98 % | SYSTOLIC BLOOD PRESSURE: 138 MMHG | HEIGHT: 72 IN | DIASTOLIC BLOOD PRESSURE: 78 MMHG | TEMPERATURE: 98 F

## 2024-09-30 DIAGNOSIS — K21.00 GASTROESOPHAGEAL REFLUX DISEASE WITH ESOPHAGITIS WITHOUT HEMORRHAGE: ICD-10-CM

## 2024-09-30 DIAGNOSIS — Z00.00 ROUTINE GENERAL MEDICAL EXAMINATION AT A HEALTH CARE FACILITY: Primary | ICD-10-CM

## 2024-09-30 DIAGNOSIS — I77.819 AORTIC DILATATION (H): ICD-10-CM

## 2024-09-30 DIAGNOSIS — M05.79 RHEUMATOID ARTHRITIS INVOLVING MULTIPLE SITES WITH POSITIVE RHEUMATOID FACTOR (H): ICD-10-CM

## 2024-09-30 DIAGNOSIS — E78.2 MIXED HYPERLIPIDEMIA: ICD-10-CM

## 2024-09-30 DIAGNOSIS — I10 PRIMARY HYPERTENSION: ICD-10-CM

## 2024-09-30 DIAGNOSIS — L40.50 PSORIATIC ARTHRITIS (H): ICD-10-CM

## 2024-09-30 DIAGNOSIS — D84.9 IMMUNODEFICIENCY (H): ICD-10-CM

## 2024-09-30 PROBLEM — R97.20 HIGH PROSTATE SPECIFIC ANTIGEN (PSA): Status: RESOLVED | Noted: 2019-02-25 | Resolved: 2024-09-30

## 2024-09-30 PROBLEM — M19.90 OSTEOARTHROSIS: Status: ACTIVE | Noted: 2023-12-04

## 2024-09-30 LAB
ANION GAP SERPL CALCULATED.3IONS-SCNC: 8 MMOL/L (ref 7–15)
BUN SERPL-MCNC: 16 MG/DL (ref 8–23)
CALCIUM SERPL-MCNC: 9.2 MG/DL (ref 8.8–10.4)
CHLORIDE SERPL-SCNC: 106 MMOL/L (ref 98–107)
CHOLEST SERPL-MCNC: 239 MG/DL
CREAT SERPL-MCNC: 1.45 MG/DL (ref 0.67–1.17)
EGFRCR SERPLBLD CKD-EPI 2021: 54 ML/MIN/1.73M2
FASTING STATUS PATIENT QL REPORTED: YES
FASTING STATUS PATIENT QL REPORTED: YES
GLUCOSE SERPL-MCNC: 98 MG/DL (ref 70–99)
HCO3 SERPL-SCNC: 27 MMOL/L (ref 22–29)
HDLC SERPL-MCNC: 42 MG/DL
LDLC SERPL CALC-MCNC: 164 MG/DL
NONHDLC SERPL-MCNC: 197 MG/DL
POTASSIUM SERPL-SCNC: 4.6 MMOL/L (ref 3.4–5.3)
SODIUM SERPL-SCNC: 141 MMOL/L (ref 135–145)
TRIGL SERPL-MCNC: 166 MG/DL

## 2024-09-30 PROCEDURE — 80061 LIPID PANEL: CPT | Performed by: PHYSICIAN ASSISTANT

## 2024-09-30 PROCEDURE — 90480 ADMN SARSCOV2 VAC 1/ONLY CMP: CPT | Performed by: PHYSICIAN ASSISTANT

## 2024-09-30 PROCEDURE — 91320 SARSCV2 VAC 30MCG TRS-SUC IM: CPT | Performed by: PHYSICIAN ASSISTANT

## 2024-09-30 PROCEDURE — 80048 BASIC METABOLIC PNL TOTAL CA: CPT | Performed by: PHYSICIAN ASSISTANT

## 2024-09-30 PROCEDURE — 90472 IMMUNIZATION ADMIN EACH ADD: CPT | Performed by: PHYSICIAN ASSISTANT

## 2024-09-30 PROCEDURE — 90677 PCV20 VACCINE IM: CPT | Performed by: PHYSICIAN ASSISTANT

## 2024-09-30 PROCEDURE — 99396 PREV VISIT EST AGE 40-64: CPT | Mod: 25 | Performed by: PHYSICIAN ASSISTANT

## 2024-09-30 PROCEDURE — 36415 COLL VENOUS BLD VENIPUNCTURE: CPT | Performed by: PHYSICIAN ASSISTANT

## 2024-09-30 PROCEDURE — 90673 RIV3 VACCINE NO PRESERV IM: CPT | Performed by: PHYSICIAN ASSISTANT

## 2024-09-30 PROCEDURE — 90471 IMMUNIZATION ADMIN: CPT | Performed by: PHYSICIAN ASSISTANT

## 2024-09-30 ASSESSMENT — PATIENT HEALTH QUESTIONNAIRE - PHQ9
SUM OF ALL RESPONSES TO PHQ QUESTIONS 1-9: 2
10. IF YOU CHECKED OFF ANY PROBLEMS, HOW DIFFICULT HAVE THESE PROBLEMS MADE IT FOR YOU TO DO YOUR WORK, TAKE CARE OF THINGS AT HOME, OR GET ALONG WITH OTHER PEOPLE: NOT DIFFICULT AT ALL
SUM OF ALL RESPONSES TO PHQ QUESTIONS 1-9: 2

## 2024-09-30 ASSESSMENT — PAIN SCALES - GENERAL: PAINLEVEL: NO PAIN (0)

## 2024-09-30 NOTE — PROGRESS NOTES
"Preventive Care Visit  [facility]  Carlos Bhatt PA-C, Physician Assistant  Sep 30, 2024      Assessment & Plan     (Z00.00) Routine general medical examination at a health care facility  (primary encounter diagnosis)  Comment:   Plan: Vaccines and screening lab work updated today, patient overall doing very well, advised RSV vaccine at pharmacy.  Follow-up in 1 year but sooner if needed    (I10) Primary hypertension  Comment:   Plan: BASIC METABOLIC PANEL        Blood pressure well-controlled today, no medication changes made    (E78.2) Mixed hyperlipidemia  Comment:   Plan: Lipid panel reflex to direct LDL Non-fasting        Recheck cholesterol today, consider statin if ASCVD risk score remains greater than 10%    (D84.9) Immunodeficiency (H)  Comment:   Plan: Due to use of Enbrel, no recent signs of infection    (K21.00) Gastroesophageal reflux disease with esophagitis without hemorrhage  Comment:   Plan: omeprazole (PRILOSEC) 20 MG DR capsule        Stable on PPI, recent upper endoscopy was within normal limits at [facility]    (L40.50) Psoriatic arthritis (H)  Comment:   Plan: Following with rheumatology, stable at this time no medication changes made    (M05.79) Rheumatoid arthritis involving multiple sites with positive rheumatoid factor (H)  Comment:   Plan: Following with rheumatology, stable at this time no medication changes made    (I77.819) Aortic dilatation (H)  Comment:   Plan: Visit with cardiology currently up-to-date, continue monitoring and metoprolol      BMI  Estimated body mass index is 29.99 kg/m  as calculated from the following:    Height as of this encounter: 1.82 m (5' 11.65\").    Weight as of this encounter: 99.3 kg (219 lb).       Counseling  Appropriate preventive services were addressed with this patient via screening, questionnaire, or discussion as appropriate for fall prevention, nutrition, physical activity, Tobacco-use cessation, social " engagement, weight loss and cognition.  Checklist reviewing preventive services available has been given to the patient.  Reviewed patient's diet, addressing concerns and/or questions.           Subjective   Nir is a 64 year old, presenting for the following:  Physical        9/30/2024     6:57 AM   Additional Questions   Roomed by LETY White   Accompanied by self         9/30/2024     6:57 AM   Patient Reported Additional Medications   Patient reports taking the following new medications N/A        Health Care Directive  Patient does not have a Health Care Directive or Living Will: Discussed advance care planning with patient; information given to patient to review.    HPI    Patient currently being treated by rheumatology for rheumatoid arthritis as well as psoriatic arthritis, both currently well-controlled, pain score of 0, no cutaneous symptoms.    Patient also with a history of renal cell cancer, status post nephrectomy, follows with urology yearly, PSAs performed at urology have been within normal limits.  History of BPH with PSA elevation, negative biopsy.    Nir is also following with [facility] for chronic abdominal pain after nephrectomy, stable on PPI    Hypertension Follow-up    Do you check your blood pressure regularly outside of the clinic? Yes   Are you following a low salt diet? Yes  Are your blood pressures ever more than 140 on the top number (systolic) OR more   than 90 on the bottom number (diastolic), for example 140/90? No    Visit within the past year for monitoring of abdominal aortic dilatation, stable at 4.4 cm, no side effects from metoprolol.        9/27/2024   General Health   How would you rate your overall physical health? Good            9/27/2024   Nutrition   Three or more servings of calcium each day? Yes   Diet: Regular (no restrictions)   How many servings of fruit and vegetables per day? (!) 2-3   How many sweetened beverages each day? 0-1            9/27/2024    Exercise   Days per week of moderate/strenous exercise 4 days   Average minutes spent exercising at this level 60 min            2024   Social Factors   Frequency of gathering with friends or relatives Three times a week   Worry food won't last until get money to buy more No   Food not last or not have enough money for food? No   Do you have housing? (Housing is defined as stable permanent housing and does not include staying ouside in a car, in a tent, in an abandoned building, in an overnight shelter, or couch-surfing.) Yes   Are you worried about losing your housing? No   Lack of transportation? No   Unable to get utilities (heat,electricity)? No            2024   Fall Risk   Fallen 2 or more times in the past year? No   Trouble with walking or balance? No             2024   Dental   Dentist two times every year? Yes             Today's PHQ-9 Score:       2024     6:51 AM   PHQ-9 SCORE   PHQ-9 Total Score MyChart 2 (Minimal depression)   PHQ-9 Total Score 2         2024   Substance Use   Alcohol more than 3/day or more than 7/wk No   Do you use any other substances recreationally? No        Social History     Tobacco Use    Smoking status: Former     Types: Cigars     Quit date: 3/12/2019     Years since quittin.5    Smokeless tobacco: Never   Vaping Use    Vaping status: Never Used   Substance Use Topics    Alcohol use: Yes     Comment: occasional    Drug use: Not Currently           2024   STI Screening   New sexual partner(s) since last STI/HIV test? No      Last PSA:   Prostate Specific Antigen Screen   Date Value Ref Range Status   2023 9.78 (H) 0.00 - 4.50 ng/mL Final   2022 4.44 0.00 - 4.50 ug/L Final     ASCVD Risk   The 10-year ASCVD risk score (Jimmy ULLOA, et al., 2019) is: 17.8%    Values used to calculate the score:      Age: 64 years      Sex: Male      Is Non- : No      Diabetic: No      Tobacco smoker: No      Systolic  "Blood Pressure: 138 mmHg      Is BP treated: Yes      HDL Cholesterol: 43 mg/dL      Total Cholesterol: 215 mg/dL           Reviewed and updated as needed this visit by Provider     Meds                BP Readings from Last 3 Encounters:   09/30/24 138/78   09/12/24 (!) 142/84   09/08/24 (!) 145/85    Wt Readings from Last 3 Encounters:   09/30/24 99.3 kg (219 lb)   09/12/24 97.4 kg (214 lb 11.2 oz)   09/08/24 96.4 kg (212 lb 8 oz)                 Objective    Exam  /78 (BP Location: Right arm, Patient Position: Sitting, Cuff Size: Adult Regular)   Pulse 66   Temp 98  F (36.7  C) (Temporal)   Resp 21   Ht 1.82 m (5' 11.65\")   Wt 99.3 kg (219 lb)   SpO2 98%   BMI 29.99 kg/m     Estimated body mass index is 29.99 kg/m  as calculated from the following:    Height as of this encounter: 1.82 m (5' 11.65\").    Weight as of this encounter: 99.3 kg (219 lb).    Physical Exam  GENERAL: alert and no distress  EYES: Eyes grossly normal to inspection, PERRL and conjunctivae and sclerae normal  HENT: ear canals and TM's normal, nose and mouth without ulcers or lesions  NECK: no adenopathy, no asymmetry, masses, or scars  RESP: lungs clear to auscultation - no rales, rhonchi or wheezes  CV: regular rate and rhythm, normal S1 S2, no S3 or S4, no murmur, click or rub, no peripheral edema  ABDOMEN: soft, nontender, no hepatosplenomegaly, no masses and bowel sounds normal  MS: no gross musculoskeletal defects noted, no edema  SKIN: no suspicious lesions or rashes  NEURO: Normal strength and tone, mentation intact and speech normal  PSYCH: mentation appears normal, affect normal/bright        Signed Electronically by: Carlos Bhatt PA-C    Answers submitted by the patient for this visit:  Patient Health Questionnaire (Submitted on 9/30/2024)  If you checked off any problems, how difficult have these problems made it for you to do your work, take care of things at home, or get along with other people?: Not difficult " at all  PHQ9 TOTAL SCORE: 2

## 2024-09-30 NOTE — PATIENT INSTRUCTIONS
Patient Education   Preventive Care Advice   This is general advice given by our system to help you stay healthy. However, your care team may have specific advice just for you. Please talk to your care team about your preventive care needs.  Nutrition  Eat 5 or more servings of fruits and vegetables each day.  Try wheat bread, brown rice and whole grain pasta (instead of white bread, rice, and pasta).  Get enough calcium and vitamin D. Check the label on foods and aim for 100% of the RDA (recommended daily allowance).  Lifestyle  Exercise at least 150 minutes each week  (30 minutes a day, 5 days a week).  Do muscle strengthening activities 2 days a week. These help control your weight and prevent disease.  No smoking.  Wear sunscreen to prevent skin cancer.  Have a dental exam and cleaning every 6 months.  Yearly exams  See your health care team every year to talk about:  Any changes in your health.  Any medicines your care team has prescribed.  Preventive care, family planning, and ways to prevent chronic diseases.  Shots (vaccines)   HPV shots (up to age 26), if you've never had them before.  Hepatitis B shots (up to age 59), if you've never had them before.  COVID-19 shot: Get this shot when it's due.  Flu shot: Get a flu shot every year.  Tetanus shot: Get a tetanus shot every 10 years.  Pneumococcal, hepatitis A, and RSV shots: Ask your care team if you need these based on your risk.  Shingles shot (for age 50 and up)  General health tests  Diabetes screening:  Starting at age 35, Get screened for diabetes at least every 3 years.  If you are younger than age 35, ask your care team if you should be screened for diabetes.  Cholesterol test: At age 39, start having a cholesterol test every 5 years, or more often if advised.  Bone density scan (DEXA): At age 50, ask your care team if you should have this scan for osteoporosis (brittle bones).  Hepatitis C: Get tested at least once in your life.  STIs (sexually  transmitted infections)  Before age 24: Ask your care team if you should be screened for STIs.  After age 24: Get screened for STIs if you're at risk. You are at risk for STIs (including HIV) if:  You are sexually active with more than one person.  You don't use condoms every time.  You or a partner was diagnosed with a sexually transmitted infection.  If you are at risk for HIV, ask about PrEP medicine to prevent HIV.  Get tested for HIV at least once in your life, whether you are at risk for HIV or not.  Cancer screening tests  Cervical cancer screening: If you have a cervix, begin getting regular cervical cancer screening tests starting at age 21.  Breast cancer scan (mammogram): If you've ever had breasts, begin having regular mammograms starting at age 40. This is a scan to check for breast cancer.  Colon cancer screening: It is important to start screening for colon cancer at age 45.  Have a colonoscopy test every 10 years (or more often if you're at risk) Or, ask your provider about stool tests like a FIT test every year or Cologuard test every 3 years.  To learn more about your testing options, visit:   .  For help making a decision, visit:   https://bit.ly/db14979.  Prostate cancer screening test: If you have a prostate, ask your care team if a prostate cancer screening test (PSA) at age 55 is right for you.  Lung cancer screening: If you are a current or former smoker ages 50 to 80, ask your care team if ongoing lung cancer screenings are right for you.  For informational purposes only. Not to replace the advice of your health care provider. Copyright   2023 [employer]. All rights reserved. Clinically reviewed by the [facility] Transitions Program. WOWIO 586604 - REV 01/24.

## 2024-09-30 NOTE — NURSING NOTE
Prior to immunization administration, verified patients identity using patient s name and date of birth. Please see Immunization Activity for additional information.     Screening Questionnaire for Adult Immunization    Are you sick today?   No   Do you have allergies to medications, food, a vaccine component or latex?   No   Have you ever had a serious reaction after receiving a vaccination?   No   Do you have a long-term health problem with heart, lung, kidney, or metabolic disease (e.g., diabetes), asthma, a blood disorder, no spleen, complement component deficiency, a cochlear implant, or a spinal fluid leak?  Are you on long-term aspirin therapy?   No   Do you have cancer, leukemia, HIV/AIDS, or any other immune system problem?   Yes   Do you have a parent, brother, or sister with an immune system problem?   No   In the past 3 months, have you taken medications that affect  your immune system, such as prednisone, other steroids, or anticancer drugs; drugs for the treatment of rheumatoid arthritis, Crohn s disease, or psoriasis; or have you had radiation treatments?   Yes   Have you had a seizure, or a brain or other nervous system problem?   No   During the past year, have you received a transfusion of blood or blood    products, or been given immune (gamma) globulin or antiviral drug?   No   For women: Are you pregnant or is there a chance you could become       pregnant during the next month?   No   Have you received any vaccinations in the past 4 weeks?   No     Immunization questionnaire was positive for at least one answer.  Britt Bhatt.      Patient instructed to remain in clinic for 15 minutes afterwards, and to report any adverse reactions.     Screening performed by Cindy Mcclellan CMA on 9/30/2024 at 7:33 AM.

## 2024-10-14 ENCOUNTER — MYC MEDICAL ADVICE (OUTPATIENT)
Dept: FAMILY MEDICINE | Facility: CLINIC | Age: 64
End: 2024-10-14
Payer: COMMERCIAL

## 2024-10-14 DIAGNOSIS — N52.9 ED (ERECTILE DYSFUNCTION) OF ORGANIC ORIGIN: Primary | ICD-10-CM

## 2024-10-15 RX ORDER — SILDENAFIL 50 MG/1
50 TABLET, FILM COATED ORAL DAILY PRN
Qty: 30 TABLET | Refills: 0 | Status: SHIPPED | OUTPATIENT
Start: 2024-10-15

## 2024-10-15 NOTE — TELEPHONE ENCOUNTER
Medication Question or Refill      What medication are you calling about (include dose and sig)?: sildenafil (VIAGRA) 50 MG tablet     Preferred Pharmacy:  [facility] [id number] [address]  [address] [place] [id number]  Phone: [phone] Fax: [phone]    Who prescribed the medication?: Historical medication    Do you need a refill? Yes    Do you have any questions or concerns?  Yes: Bucky Gonzalez could you please refill my sildenafil I am out and I am leaving town on wednesday. Thank you so much Nir Jim.     Could we send this information to you in [facility] or would you prefer to receive a phone call?:   Patient would prefer a phone call   Okay to leave a detailed message?: No at Home number on file [phone] (home)     Carlos,    Please review. Pended, sign if agree    Kendrick ROACH RN  [facility]

## 2024-10-16 NOTE — TELEPHONE ENCOUNTER
Writer responded via [facility].  ANABEL CaldwellN, RN-BC  [facility]

## 2024-12-03 ENCOUNTER — MYC MEDICAL ADVICE (OUTPATIENT)
Dept: FAMILY MEDICINE | Facility: CLINIC | Age: 64
End: 2024-12-03
Payer: COMMERCIAL

## 2024-12-03 DIAGNOSIS — N52.9 ED (ERECTILE DYSFUNCTION) OF ORGANIC ORIGIN: ICD-10-CM

## 2024-12-04 RX ORDER — SILDENAFIL 50 MG/1
50 TABLET, FILM COATED ORAL DAILY PRN
Qty: 30 TABLET | Refills: 3 | Status: SHIPPED | OUTPATIENT
Start: 2024-12-04

## 2025-01-08 ENCOUNTER — MYC MEDICAL ADVICE (OUTPATIENT)
Dept: FAMILY MEDICINE | Facility: CLINIC | Age: 65
End: 2025-01-08
Payer: COMMERCIAL

## 2025-01-08 DIAGNOSIS — N52.9 ED (ERECTILE DYSFUNCTION) OF ORGANIC ORIGIN: ICD-10-CM

## 2025-01-09 RX ORDER — SILDENAFIL 50 MG/1
50 TABLET, FILM COATED ORAL DAILY PRN
Qty: 30 TABLET | Refills: 2 | Status: SHIPPED | OUTPATIENT
Start: 2025-01-09

## 2025-01-09 NOTE — TELEPHONE ENCOUNTER
Sent as signed to alternate pt preferred pharmacy.  KIRT Gonzalez, BSN, PHN, AMB-BC (she/her)  [facility] RN

## 2025-01-09 NOTE — TELEPHONE ENCOUNTER
Writer replied to patient via [employer].  KIRT Gonzalez BSN, PHN, AMB-BC (she/her)  [facility] RN

## 2025-02-11 ENCOUNTER — TRANSFERRED RECORDS (OUTPATIENT)
Dept: HEALTH INFORMATION MANAGEMENT | Facility: CLINIC | Age: 65
End: 2025-02-11
Payer: COMMERCIAL

## 2025-02-11 ENCOUNTER — MEDICAL CORRESPONDENCE (OUTPATIENT)
Dept: HEALTH INFORMATION MANAGEMENT | Facility: CLINIC | Age: 65
End: 2025-02-11
Payer: COMMERCIAL

## 2025-02-18 ENCOUNTER — TRANSCRIBE ORDERS (OUTPATIENT)
Dept: OTHER | Age: 65
End: 2025-02-18

## 2025-02-18 DIAGNOSIS — K59.89 VISCERAL HYPERSENSITIVITY SYNDROME: Primary | ICD-10-CM

## 2025-03-03 ASSESSMENT — PAIN SCALES - PAIN ENJOYMENT GENERAL ACTIVITY SCALE (PEG)
AVG_PAIN_PASTWEEK: 4
PEG_TOTALSCORE: 5.33
INTERFERED_ENJOYMENT_LIFE: 6
INTERFERED_GENERAL_ACTIVITY: 6

## 2025-03-04 ENCOUNTER — TELEPHONE (OUTPATIENT)
Dept: RHEUMATOLOGY | Facility: CLINIC | Age: 65
End: 2025-03-04
Payer: COMMERCIAL

## 2025-03-04 DIAGNOSIS — L40.50 PSORIATIC ARTHRITIS (H): ICD-10-CM

## 2025-03-06 ENCOUNTER — OFFICE VISIT (OUTPATIENT)
Dept: PALLIATIVE MEDICINE | Facility: OTHER | Age: 65
End: 2025-03-06
Payer: COMMERCIAL

## 2025-03-06 VITALS
HEART RATE: 75 BPM | BODY MASS INDEX: 29.99 KG/M2 | OXYGEN SATURATION: 97 % | WEIGHT: 219 LBS | SYSTOLIC BLOOD PRESSURE: 134 MMHG | DIASTOLIC BLOOD PRESSURE: 88 MMHG

## 2025-03-06 DIAGNOSIS — M79.18 MYOFASCIAL PAIN: ICD-10-CM

## 2025-03-06 DIAGNOSIS — G89.29 CHRONIC ABDOMINAL PAIN: Primary | ICD-10-CM

## 2025-03-06 DIAGNOSIS — R10.9 CHRONIC ABDOMINAL PAIN: Primary | ICD-10-CM

## 2025-03-06 DIAGNOSIS — K59.89 VISCERAL HYPERSENSITIVITY SYNDROME: ICD-10-CM

## 2025-03-06 PROCEDURE — 99213 OFFICE O/P EST LOW 20 MIN: CPT | Performed by: STUDENT IN AN ORGANIZED HEALTH CARE EDUCATION/TRAINING PROGRAM

## 2025-03-06 ASSESSMENT — PAIN SCALES - GENERAL: PAINLEVEL_OUTOF10: MODERATE PAIN (4)

## 2025-03-06 NOTE — PATIENT INSTRUCTIONS
Procedures:   Bilateral rectus sheath TAP block   Consider TPI more superficially subsequently  Physical Therapy: continue HEP  Diagnostic Studies: CT Abdomen reviewed  Follow up: for procedures    Thiago Gtz MD  Interventional Pain Medicine  [facility]

## 2025-03-06 NOTE — PROGRESS NOTES
[facility] Interventional Evaluation    Date of visit: 3/6/2025    Reason for consultation:    Kai Jim is a 64 year old male who is seen in consultation today for evaluation of an interventional strategy for pain management.    PCP is Carlos Bhatt.    Please see the [facility] health questionnaire which the patient completed and reviewed with me in detail.    Chief Complaint:    localized pain at abdominal incision site      Pain history:  Kai Jim is a 64 year old male presenting with a chief pain complaint of localized pain at abdominal incision site (considering TPI).     Relevant PMH - psoriatic arthritis, primary osteoarthritis of multiple joints, nephrectomy (renal cell carcinoma, prior Cr range 1.25-1.45)    Onset: Post hernia-surgery 1.5 years ago. Has mesh. Also has history of multiple surgeries including nephrectomy  Location and Radiation: midline abdomen (where the incision site from hernia was), nonradiating  Provoking: weightlifting, eating too much, sitting down it hurts  Palliating: standing doesn't hurt as much,   Quality: aching (deeper rather than superficial)  Severity: 3/10 (7/10 immediately after hernia + steadily decreasing)  Timing: constant  Numbness: none  Weakness: none    Patient denies red flag symptoms of corresponding bowel/bladder symptoms, fever/chills, saddle anesthesia, profound motor loss, weight loss, or sudden unremitting increase in pain.    Current pain medications include:  - imipramine 25 mg every morning - don't know if it helps (started at 10 mg + now 25 since couple months ago)    Previous medication treatments included:  None    Other treatments have included:  Kai Jim has not been seen at a pain clinic in the past.    PT: none  Injections: none  Surgery: hernia repair.  Alternative: none    Past Medical History:  Past Medical History:   Diagnosis Date    Benign localized  "prostatic hyperplasia with lower urinary tract symptoms (LUTS) 05/12/2023    BPH (benign prostatic hyperplasia)     Calculus of kidney 05/12/2023    CARDIOVASCULAR SCREENING; LDL GOAL LESS THAN 130 10/31/2010    Chronic pain of right knee 03/16/2017    Chronic sinusitis, unspecified 05/12/2023    Degenerative joint disease of sacroiliac joint 04/17/2018    ED (erectile dysfunction)     ED (erectile dysfunction) of organic origin 05/12/2023    Environmental allergies 05/12/2023    Family history of kidney stones     FH: total knee replacement 12/19/2022    Gastroesophageal reflux disease     High prostate specific antigen (PSA) 02/25/2019    R97.20 : Raised prostate specific antigen      Hyperlipidemia     Hypertension     Increased frequency of urination 01/17/2019    Kidney mass     Kidney stone     Knee pain     right- \"bone on bone\"    Left inguinal hernia 11/13/2020    Formatting of this note might be different from the original. Added automatically from request for surgery [id number]    Motion sickness     Primary osteoarthritis involving multiple joints 04/17/2018    Primary osteoarthritis of right knee 05/12/2023    Psoriasis 02/13/2020    Psoriatic arthritis (H) 01/08/2016    Pure hypercholesterolemia 05/12/2023    RA (rheumatoid arthritis) (H)     psoriatic    Raised prostate specific antigen 02/26/2019    Rheumatoid arthritis (H) 05/12/2023    Right kidney mass 04/06/2022    S/p nephrectomy 05/12/2023    Seasonal allergies     Sinusitis     Squamous cell carcinoma of skin      Patient Active Problem List    Diagnosis Date Noted    Immunodeficiency 09/30/2024     Priority: Medium    Osteoarthrosis 12/04/2023     Priority: Medium    Benign localized prostatic hyperplasia with lower urinary tract symptoms (LUTS) 05/12/2023     Priority: Medium    Calculus of kidney 05/12/2023     Priority: Medium    Chronic sinusitis, unspecified 05/12/2023     Priority: Medium    ED (erectile dysfunction) of organic origin " 05/12/2023     Priority: Medium    Environmental allergies 05/12/2023     Priority: Medium    Hyperlipidemia 05/12/2023     Priority: Medium    Primary osteoarthritis of right knee 05/12/2023     Priority: Medium    Pure hypercholesterolemia 05/12/2023     Priority: Medium    Rheumatoid arthritis (H) 05/12/2023     Priority: Medium    S/p nephrectomy 05/12/2023     Priority: Medium    FH: total knee replacement 12/19/2022     Priority: Medium    Left inguinal hernia 11/13/2020     Priority: Medium     Formatting of this note might be different from the original.  Added automatically from request for surgery [id number]      Raised prostate specific antigen 02/26/2019     Priority: Medium    Increased frequency of urination 01/17/2019     Priority: Medium    Degenerative joint disease of sacroiliac joint 04/17/2018     Priority: Medium    Primary osteoarthritis involving multiple joints 04/17/2018     Priority: Medium    Psoriatic arthritis (H) 01/08/2016     Priority: Medium    CARDIOVASCULAR SCREENING; LDL GOAL LESS THAN 130 10/31/2010     Priority: Medium       Past Surgical History:  Past Surgical History:   Procedure Laterality Date    CARPAL TUNNEL RELEASE RT/LT Bilateral     DAVINCI NEPHRECTOMY Right 04/06/2022    Procedure: NEPHRECTOMY, RADICAL, ROBOT-ASSISTED LAPAROSCOPIC;  Surgeon: Brad Luciano MD;  Location: [facility] OR    DAVINCI XI HERNIORRHAPHY INGUINAL Right 4/27/2023    Procedure: HERNIORRHAPHY, INGUINAL, ROBOT-ASSISTED, LAPAROSCOPIC, USING DA OBEY XI;  Surgeon: Jareth Julian MD;  Location: [facility] OR    DAVINCI XI HERNIORRHAPHY VENTRAL N/A 4/27/2023    Procedure: HERNIORRHAPHY, VENTRAL, ROBOT-ASSISTED, LAPAROSCOPIC, USING DA OBEY XI x 2;  Surgeon: Jareth Julian MD;  Location: [facility] OR    finger amputation surgery Right     Due to log splitter. x2 fingers    HERNIA REPAIR  01/01/1991    Right inguinal    LYSIS, ADHESIONS, ROBOT-ASSISTED, LAPAROSCOPIC, USING DA OBEY XI N/A  4/27/2023    Procedure: LYSIS, ADHESIONS, ROBOT-ASSISTED, LAPAROSCOPIC, USING DA OBEY XI;  Surgeon: Jareth Julian MD;  Location: [facility] [place]    NOSE SURGERY      IN LAP,INGUINAL HERNIA REPR,INITIAL Left 12/17/2020    Procedure: HERNIORRHAPHY, INGUINAL, LAPAROSCOPIC;  Surgeon: Jareth Julian MD;  Location: [facility] [place];  Service: General    VARICOCELE EXCISION       Medications:  Current Outpatient Medications   Medication Sig Dispense Refill    etanercept (ENBREL SURECLICK) 50 MG/ML autoinjector Inject 50 mg subcutaneously once a week. Hold for signs of infection, and seek medical attention. 4 mL 5    imipramine (TOFRANIL) 25 MG tablet Take 25 mg by mouth every morning.      metoprolol succinate ER (TOPROL XL) 25 MG 24 hr tablet Take 1 tablet (25 mg) by mouth daily 90 tablet 3    omeprazole (PRILOSEC) 20 MG DR capsule Take 1 capsule (20 mg) by mouth daily. 90 capsule 3    sildenafil (VIAGRA) 50 MG tablet Take 1 tablet (50 mg) by mouth daily as needed (ED). Take 60 minutes prior to sex. 30 tablet 2    sildenafil (VIAGRA) 50 MG tablet Take  mg by mouth daily as needed Stop date 9/7/2022      tamsulosin (FLOMAX) 0.4 MG capsule Take 0.4 mg by mouth daily      Vitamin D3 (CHOLECALCIFEROL) 125 MCG (5000 UT) tablet Take 1 tablet by mouth daily       Allergies:   No Known Allergies      Family history:  Family History   Problem Relation Age of Onset    Heart Disease Mother     Heart Disease Father        Physical Exam:  There were no vitals filed for this visit.  Exam:  Constitutional: Well developed, NAD  Head: normocephalic. Atraumatic.   Eyes: no redness or jaundice noted   CV: warm, well perfused extremities   Skin: no suspicious lesions or rashes   Psychiatric: mentation appears normal and affect     Neuromuscular Examination:    - Abdominal quadrants - light touch doesn't elicit pain, palpation of RUQ/LUQ/LLQ/RLQ did not elicit further pain   - Carnett's sign - negative  - bilateral straight  leg raise - negative   - CR - negative    Diagnostic tests:      Personally reviewed imaging: yes - images available in chart      Assessment:  Chronic abdominal pain  Myofascial pain    Kai Jim is a 64 year old male with PMH of primary osteoarthritis of multiple joints, nephrectomy (prior Cr range 1.25-1.45), who presents with localized pain at midline abdominal incision site for 1.5 years post hernia repair surgery that has been constant and progressively decreasing in pain (7/10 -> 3-4/10). He describes the pain as aching and deep without numbness/tingling or weakness. Provoking factors include weightlifting, sitting, and eating until full, while standing improves pain. He has taken imipramine (25 mg/day) and is unsure if it helps. Abdominal wall palpation/light touch, Carnett's sign, bilateral straight leg raise, and CR were negative. He has taken no other medications for pain and has had no prior surgeries/injections for the pain.  We will proceed with bilateral rectus sheath blocks and consider TPI subsequently if not receiving sufficient relief.    Plan:  Diagnosis reviewed, treatment option addressed, and risk/benefits discussed.     Procedures:   Bilateral rectus sheath TAP block   Consider TPI more superficially subsequently  Physical Therapy: continue HEP  Diagnostic Studies: CT Abdomen reviewed  Follow up: for procedures    Thiago Gtz MD  Interventional Pain Medicine  [facility]

## 2025-03-10 ENCOUNTER — LAB (OUTPATIENT)
Dept: LAB | Facility: CLINIC | Age: 65
End: 2025-03-10
Payer: COMMERCIAL

## 2025-03-10 DIAGNOSIS — L40.50 PSORIATIC ARTHRITIS (H): ICD-10-CM

## 2025-03-10 LAB
ALBUMIN SERPL BCG-MCNC: 3.8 G/DL (ref 3.5–5.2)
ALT SERPL W P-5'-P-CCNC: 14 U/L (ref 0–70)
CREAT SERPL-MCNC: 1.42 MG/DL (ref 0.67–1.17)
EGFRCR SERPLBLD CKD-EPI 2021: 55 ML/MIN/1.73M2
ERYTHROCYTE [DISTWIDTH] IN BLOOD BY AUTOMATED COUNT: 13.2 % (ref 10–15)
HCT VFR BLD AUTO: 40.1 % (ref 40–53)
HGB BLD-MCNC: 13.5 G/DL (ref 13.3–17.7)
MCH RBC QN AUTO: 29.5 PG (ref 26.5–33)
MCHC RBC AUTO-ENTMCNC: 33.7 G/DL (ref 31.5–36.5)
MCV RBC AUTO: 88 FL (ref 78–100)
PLATELET # BLD AUTO: 235 10E3/UL (ref 150–450)
RBC # BLD AUTO: 4.57 10E6/UL (ref 4.4–5.9)
WBC # BLD AUTO: 6 10E3/UL (ref 4–11)

## 2025-03-10 PROCEDURE — 82040 ASSAY OF SERUM ALBUMIN: CPT

## 2025-03-10 PROCEDURE — 36415 COLL VENOUS BLD VENIPUNCTURE: CPT

## 2025-03-10 PROCEDURE — 84460 ALANINE AMINO (ALT) (SGPT): CPT

## 2025-03-10 PROCEDURE — 85027 COMPLETE CBC AUTOMATED: CPT

## 2025-03-10 PROCEDURE — 82565 ASSAY OF CREATININE: CPT

## 2025-03-11 ENCOUNTER — TELEPHONE (OUTPATIENT)
Dept: PALLIATIVE MEDICINE | Facility: OTHER | Age: 65
End: 2025-03-11
Payer: COMMERCIAL

## 2025-03-11 ENCOUNTER — TELEPHONE (OUTPATIENT)
Dept: RHEUMATOLOGY | Facility: CLINIC | Age: 65
End: 2025-03-11
Payer: COMMERCIAL

## 2025-03-11 PROBLEM — R10.9 CHRONIC ABDOMINAL PAIN: Status: ACTIVE | Noted: 2025-03-06

## 2025-03-11 PROBLEM — G89.29 CHRONIC ABDOMINAL PAIN: Status: ACTIVE | Noted: 2025-03-06

## 2025-03-11 RX ORDER — MEDROXYPROGESTERONE ACETATE 150 MG/ML
INJECTION, SUSPENSION INTRAMUSCULAR
Qty: 4 ML | Refills: 2 | OUTPATIENT
Start: 2025-03-11 | End: 2025-03-12

## 2025-03-11 NOTE — TELEPHONE ENCOUNTER
Called patient to schedule procedure with Dr. Gtz    Date of Procedure: 4/4/25    Arrival time given: Yes: Arrival Time 8:30am       Procedure Location: [facility]     Verified Location with Patient:  Yes  Address provided to the patient     Pre-op H&P Required:  No: Local anesthesia        Post-Op/Follow Up Appt:  Not Indicated in Request      Informed patient they will need a  to drive them home:  Yes    Patients : Spouse     Patient is aware that pre-op RN from the procedure center will call 2-3 days prior to scheduled procedure to confirm arrival time and review any instructions:  Yes       Additional Comments: N/A        Mitzy Jimenez MA on 3/11/2025 at 11:39 AM      P: [phone]*

## 2025-03-11 NOTE — TELEPHONE ENCOUNTER
RN reviewed patient chart. Pre procedure instructions were sent to the patient.      Tamra Burton RNCC

## 2025-03-11 NOTE — TELEPHONE ENCOUNTER
Patient is eligible to fill with [facility].  Patient currently filling with [facility], left message for patient to offer [facility].

## 2025-03-12 ENCOUNTER — OFFICE VISIT (OUTPATIENT)
Dept: RHEUMATOLOGY | Facility: CLINIC | Age: 65
End: 2025-03-12
Payer: COMMERCIAL

## 2025-03-12 VITALS
HEART RATE: 59 BPM | OXYGEN SATURATION: 98 % | DIASTOLIC BLOOD PRESSURE: 77 MMHG | WEIGHT: 220.6 LBS | BODY MASS INDEX: 30.21 KG/M2 | SYSTOLIC BLOOD PRESSURE: 128 MMHG

## 2025-03-12 DIAGNOSIS — Z79.899 HIGH RISK MEDICATION USE: ICD-10-CM

## 2025-03-12 DIAGNOSIS — M65.332 TRIGGER FINGER, LEFT MIDDLE FINGER: ICD-10-CM

## 2025-03-12 DIAGNOSIS — M15.0 PRIMARY OSTEOARTHRITIS INVOLVING MULTIPLE JOINTS: ICD-10-CM

## 2025-03-12 DIAGNOSIS — L40.50 PSORIATIC ARTHRITIS (H): Primary | ICD-10-CM

## 2025-03-12 DIAGNOSIS — L40.9 PSORIASIS: ICD-10-CM

## 2025-03-12 PROCEDURE — 3078F DIAST BP <80 MM HG: CPT | Performed by: INTERNAL MEDICINE

## 2025-03-12 PROCEDURE — 3074F SYST BP LT 130 MM HG: CPT | Performed by: INTERNAL MEDICINE

## 2025-03-12 PROCEDURE — G2211 COMPLEX E/M VISIT ADD ON: HCPCS | Performed by: INTERNAL MEDICINE

## 2025-03-12 PROCEDURE — 99214 OFFICE O/P EST MOD 30 MIN: CPT | Performed by: INTERNAL MEDICINE

## 2025-03-12 RX ORDER — MEDROXYPROGESTERONE ACETATE 150 MG/ML
50 INJECTION, SUSPENSION INTRAMUSCULAR WEEKLY
Qty: 4 ML | Refills: 5 | Status: SHIPPED | OUTPATIENT
Start: 2025-03-12 | End: 2025-04-11

## 2025-03-12 NOTE — PROGRESS NOTES
Rheumatology follow-up visit note     Kai is a 64 year old male presents today for follow-up.    Nir was seen today for recheck.    Diagnoses and all orders for this visit:    Psoriatic arthritis (H)  -     etanercept (ENBREL SURECLICK) 50 MG/ML autoinjector; Inject 50 mg subcutaneously once a week.  -     Med Therapy Management Referral    Psoriasis  -     etanercept (ENBREL SURECLICK) 50 MG/ML autoinjector; Inject 50 mg subcutaneously once a week.  -     Med Therapy Management Referral    High risk medication use    Primary osteoarthritis involving multiple joints    Trigger finger, left middle finger        The longitudinal plan of care for the diagnosis(es)/condition(s) as documented were addressed during this visit. Due to the added complexity in care, I will continue to support Nir in the subsequent management and with ongoing continuity of care.      Follow up in 6 months.    HPI   Kai Jim is a 64 year old male is here for follow-up of   psoriatic arthritis, osteoarthritis, psoriasis.  This is in the background of comorbidities as noted.  Apart from some triggering of the right middle finger which is painless he seems to have done well over the past several months on Enbrel.  He noted pain level to be 0.5/10.  Able to do day-to-day activities without difficulty.  Morning stiffness is no more than 10 minutes.  He is working with the pain clinic for the incisional pain where he had the nephrectomy.  He is well aware also avoidance of nonsteroidals in view of the renal impairment. As 1 may recall he is status post nephrectomy for renal cell carcinoma. At 1 point in the past he was on leflunomide.  The management of trigger finger was discussed.         DETAILED EXAMINATION  03/12/25  :    Vitals:    03/12/25 0756   BP: 128/77   BP Location: Right arm   Pulse: 59   SpO2: 98%   Weight: 100.1 kg (220 lb 9.6 oz)     Alert oriented. Head including the face is examined for malar rash, heliotropes,  scarring, lupus pernio. Eyes examined for redness such as in episcleritis/scleritis, periorbital lesions.   Neck/ Face examined for parotid gland swelling, range of motion of neck.  Left upper and lower and right upper and lower extremities examined for tenderness, swelling, warmth of the appendicular joints, range of motion, edema, rash.  Some of the important findings included: he does not have evidence of synovitis in the palpable joints of the upper extremities.  Heberden's.  The triggering of the right middle finger which is painless.  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     Patient Active Problem List    Diagnosis Date Noted    Chronic abdominal pain 03/06/2025     Priority: Medium    Immunodeficiency 09/30/2024     Priority: Medium    Osteoarthrosis 12/04/2023     Priority: Medium    Benign localized prostatic hyperplasia with lower urinary tract symptoms (LUTS) 05/12/2023     Priority: Medium    Calculus of kidney 05/12/2023     Priority: Medium    Chronic sinusitis, unspecified 05/12/2023     Priority: Medium    ED (erectile dysfunction) of organic origin 05/12/2023     Priority: Medium    Environmental allergies 05/12/2023     Priority: Medium    Hyperlipidemia 05/12/2023     Priority: Medium    Primary osteoarthritis of right knee 05/12/2023     Priority: Medium    Pure hypercholesterolemia 05/12/2023     Priority: Medium    Rheumatoid arthritis (H) 05/12/2023     Priority: Medium    S/p nephrectomy 05/12/2023     Priority: Medium    FH: total knee replacement 12/19/2022     Priority: Medium    Left inguinal hernia 11/13/2020     Priority: Medium     Formatting of this note might be different from the original.  Added automatically from request for surgery [id number]      Raised prostate specific antigen 02/26/2019     Priority: Medium    Increased frequency of urination 01/17/2019     Priority: Medium    Degenerative joint disease of sacroiliac joint 04/17/2018     Priority: Medium    Primary  osteoarthritis involving multiple joints 04/17/2018     Priority: Medium    Psoriatic arthritis (H) 01/08/2016     Priority: Medium    CARDIOVASCULAR SCREENING; LDL GOAL LESS THAN 130 10/31/2010     Priority: Medium     Past Surgical History:   Procedure Laterality Date    CARPAL TUNNEL RELEASE RT/LT Bilateral     DAVINCI NEPHRECTOMY Right 04/06/2022    Procedure: NEPHRECTOMY, RADICAL, ROBOT-ASSISTED LAPAROSCOPIC;  Surgeon: Brad Luciano MD;  Location: [facility] OR    DAVINCI XI HERNIORRHAPHY INGUINAL Right 4/27/2023    Procedure: HERNIORRHAPHY, INGUINAL, ROBOT-ASSISTED, LAPAROSCOPIC, USING DA OBEY XI;  Surgeon: Jareth Julian MD;  Location: [facility] OR    DAVINCI XI HERNIORRHAPHY VENTRAL N/A 4/27/2023    Procedure: HERNIORRHAPHY, VENTRAL, ROBOT-ASSISTED, LAPAROSCOPIC, USING DA OBEY XI x 2;  Surgeon: Jareth Julian MD;  Location: [facility] OR    finger amputation surgery Right     Due to log splitter. x2 fingers    HERNIA REPAIR  01/01/1991    Right inguinal    LYSIS, ADHESIONS, ROBOT-ASSISTED, LAPAROSCOPIC, USING DA OBEY XI N/A 4/27/2023    Procedure: LYSIS, ADHESIONS, ROBOT-ASSISTED, LAPAROSCOPIC, USING DA OBEY XI;  Surgeon: Jareth Julian MD;  Location: [facility] OR    NOSE SURGERY      NM LAP,INGUINAL HERNIA REPR,INITIAL Left 12/17/2020    Procedure: HERNIORRHAPHY, INGUINAL, LAPAROSCOPIC;  Surgeon: Jareth Julian MD;  Location: [facility];  Service: General    VARICOCELE EXCISION        Past Medical History:   Diagnosis Date    Benign localized prostatic hyperplasia with lower urinary tract symptoms (LUTS) 05/12/2023    BPH (benign prostatic hyperplasia)     Calculus of kidney 05/12/2023    CARDIOVASCULAR SCREENING; LDL GOAL LESS THAN 130 10/31/2010    Chronic pain of right knee 03/16/2017    Chronic sinusitis, unspecified 05/12/2023    Degenerative joint disease of sacroiliac joint 04/17/2018    ED (erectile dysfunction)     ED (erectile dysfunction) of organic  "origin 05/12/2023    Environmental allergies 05/12/2023    Family history of kidney stones     FH: total knee replacement 12/19/2022    Gastroesophageal reflux disease     High prostate specific antigen (PSA) 02/25/2019    R97.20 : Raised prostate specific antigen      Hyperlipidemia     Hypertension     Increased frequency of urination 01/17/2019    Kidney mass     Kidney stone     Knee pain     right- \"bone on bone\"    Left inguinal hernia 11/13/2020    Formatting of this note might be different from the original. Added automatically from request for surgery [id number]    Motion sickness     Primary osteoarthritis involving multiple joints 04/17/2018    Primary osteoarthritis of right knee 05/12/2023    Psoriasis 02/13/2020    Psoriatic arthritis (H) 01/08/2016    Pure hypercholesterolemia 05/12/2023    RA (rheumatoid arthritis) (H)     psoriatic    Raised prostate specific antigen 02/26/2019    Rheumatoid arthritis (H) 05/12/2023    Right kidney mass 04/06/2022    S/p nephrectomy 05/12/2023    Seasonal allergies     Sinusitis     Squamous cell carcinoma of skin      No Known Allergies  Current Outpatient Medications   Medication Sig Dispense Refill    etanercept (ENBREL SURECLICK) 50 MG/ML autoinjector INJECT 1 PEN UNDER THE SKIN EVERY 7 DAYS. HOLD FOR SIGNS OF INFECTION, AND SEEK MEDICAL ATTENTION 4 mL 2    imipramine (TOFRANIL) 25 MG tablet Take 25 mg by mouth every morning.      metoprolol succinate ER (TOPROL XL) 25 MG 24 hr tablet Take 1 tablet (25 mg) by mouth daily 90 tablet 3    omeprazole (PRILOSEC) 20 MG DR capsule Take 1 capsule (20 mg) by mouth daily. 90 capsule 3    sildenafil (VIAGRA) 50 MG tablet Take 1 tablet (50 mg) by mouth daily as needed (ED). Take 60 minutes prior to sex. 30 tablet 2    tamsulosin (FLOMAX) 0.4 MG capsule Take 0.4 mg by mouth daily      Vitamin D3 (CHOLECALCIFEROL) 125 MCG (5000 UT) tablet Take 1 tablet by mouth daily       family history includes Heart Disease in his father and " mother.  Social Connections: Unknown (9/27/2024)    Social Connection and Isolation Panel [NHANES]     Frequency of Communication with Friends and Family: Not on file     Frequency of Social Gatherings with Friends and Family: Three times a week     Attends Evangelical Services: Not on file     Active Member of Clubs or Organizations: Not on file     Attends Club or Organization Meetings: Not on file     Marital Status: Not on file          WBC Count   Date Value Ref Range Status   03/10/2025 6.0 4.0 - 11.0 10e3/uL Final     RBC Count   Date Value Ref Range Status   03/10/2025 4.57 4.40 - 5.90 10e6/uL Final     Hemoglobin   Date Value Ref Range Status   03/10/2025 13.5 13.3 - 17.7 g/dL Final     Hematocrit   Date Value Ref Range Status   03/10/2025 40.1 40.0 - 53.0 % Final     MCV   Date Value Ref Range Status   03/10/2025 88 78 - 100 fL Final     MCH   Date Value Ref Range Status   03/10/2025 29.5 26.5 - 33.0 pg Final     Platelet Count   Date Value Ref Range Status   03/10/2025 235 150 - 450 10e3/uL Final     % Lymphocytes   Date Value Ref Range Status   04/17/2018 20 20 - 40 % Final     ALT   Date Value Ref Range Status   03/10/2025 14 0 - 70 U/L Final     Albumin   Date Value Ref Range Status   03/10/2025 3.8 3.5 - 5.2 g/dL Final   05/11/2022 3.8 3.5 - 5.0 g/dL Final     Creatinine   Date Value Ref Range Status   03/10/2025 1.42 (H) 0.67 - 1.17 mg/dL Final     GFR Estimate   Date Value Ref Range Status   03/10/2025 55 (L) >60 mL/min/1.73m2 Final     Comment:     eGFR calculated using 2021 CKD-EPI equation.   06/29/2021 >60 >60 mL/min/1.73m2 Final     GFR Estimate If Black   Date Value Ref Range Status   06/29/2021 >60 >60 mL/min/1.73m2 Final     Erythrocyte Sedimentation Rate   Date Value Ref Range Status   04/17/2018 15 0 - 15 mm/hr Final     CRP   Date Value Ref Range Status   04/17/2018 0.7 0.0 - 0.8 mg/dL Final

## 2025-04-04 ENCOUNTER — HOSPITAL ENCOUNTER (OUTPATIENT)
Facility: AMBULATORY SURGERY CENTER | Age: 65
Discharge: HOME | End: 2025-04-04
Attending: STUDENT IN AN ORGANIZED HEALTH CARE EDUCATION/TRAINING PROGRAM | Admitting: STUDENT IN AN ORGANIZED HEALTH CARE EDUCATION/TRAINING PROGRAM
Payer: COMMERCIAL

## 2025-04-04 ENCOUNTER — ANCILLARY ORDERS (OUTPATIENT)
Dept: PALLIATIVE MEDICINE | Facility: OTHER | Age: 65
End: 2025-04-04

## 2025-04-04 ENCOUNTER — ANCILLARY PROCEDURE (OUTPATIENT)
Dept: RADIOLOGY | Facility: AMBULATORY SURGERY CENTER | Age: 65
End: 2025-04-04
Attending: STUDENT IN AN ORGANIZED HEALTH CARE EDUCATION/TRAINING PROGRAM
Payer: COMMERCIAL

## 2025-04-04 VITALS
HEIGHT: 72 IN | TEMPERATURE: 97.9 F | OXYGEN SATURATION: 98 % | WEIGHT: 220 LBS | BODY MASS INDEX: 29.8 KG/M2 | HEART RATE: 63 BPM | DIASTOLIC BLOOD PRESSURE: 94 MMHG | RESPIRATION RATE: 14 BRPM | SYSTOLIC BLOOD PRESSURE: 149 MMHG

## 2025-04-04 DIAGNOSIS — R52 PAIN: ICD-10-CM

## 2025-04-04 DIAGNOSIS — R52 PAIN: Primary | ICD-10-CM

## 2025-04-04 PROCEDURE — 64488 TAP BLOCK BI INJECTION: CPT | Mod: GC | Performed by: STUDENT IN AN ORGANIZED HEALTH CARE EDUCATION/TRAINING PROGRAM

## 2025-04-04 PROCEDURE — 64488 TAP BLOCK BI INJECTION: CPT

## 2025-04-04 RX ORDER — ROPIVACAINE HYDROCHLORIDE 5 MG/ML
INJECTION, SOLUTION EPIDURAL; INFILTRATION; PERINEURAL DAILY PRN
Status: DISCONTINUED | OUTPATIENT
Start: 2025-04-04 | End: 2025-04-04 | Stop reason: HOSPADM

## 2025-04-04 RX ORDER — TRIAMCINOLONE ACETONIDE 40 MG/ML
INJECTION, SUSPENSION INTRA-ARTICULAR; INTRAMUSCULAR DAILY PRN
Status: DISCONTINUED | OUTPATIENT
Start: 2025-04-04 | End: 2025-04-04 | Stop reason: HOSPADM

## 2025-04-04 RX ORDER — LIDOCAINE HYDROCHLORIDE 10 MG/ML
INJECTION, SOLUTION EPIDURAL; INFILTRATION; INTRACAUDAL; PERINEURAL DAILY PRN
Status: DISCONTINUED | OUTPATIENT
Start: 2025-04-04 | End: 2025-04-04 | Stop reason: HOSPADM

## 2025-04-04 NOTE — OP NOTE
Pre procedure Diagnosis: Chronic abdominal pain  Post procedure Diagnosis: Same  Procedure performed: bilateral rectus sheath block  Anesthesia: local  Complications: none immediately noted  Operators: Thiago Gtz MD, Justus Wagoner MD    Indications:   Kai Jim is a 64 year old male was sent by myself for bilateral rectus sheath block.    Options/alternatives, benefits and risks were discussed with the patient including bleeding, infection, tissue trauma, exposure to radiation, reaction to medications including seizure, peritonitis and bowel perforation, nerve injury, weakness, and numbness.  Questions were answered to his  satisfaction and she agrees to proceed. Voluntary informed consent was obtained and signed.     Vitals were reviewed: Yes  Allergies were reviewed:  Yes   Medications were reviewed:  Yes   Pre-procedure pain score: 4/10    Procedure:  After getting informed consent, patient was brought into the procedure suite and was placed in a supine position on the procedure table.       A procedural pause verifying correct patient name, allergies, and surgical site was performed immediately prior to beginning the procedure.    Using ultrasound guidance a linear probe was placed laterally above the umbilicus non-midline. The Rectus Abdominus, External Oblique, Internal Oblique, Transversus Abdominis and Peritoneum were identified.    In this position using an in plane technique, the skin overlying the entry site and subcutaneous tissue estimating the procedure needle trajectory was anesthetized using 2mL of 1% lidocaine with a 25-gauge, 1.5 inch needle.    A 21g 100mm Pajunk needle was utilized to optimize in plane needle visualization.  This needle was placed through the anesthetized skin entry site, with a syringe containing preservative free normal saline connected via extension tubing.    The needle was visualized in plane to approach the tissue plane just deep to the rectus abdominus in the  rectus sheath without violation of the peritoneal cavity.    Using the attached syringe preservative-free normal saline was injected to visualize separation of the two muscle layers. Once verified without moving the needle, the saline syringe was removed from the extension tubing and the syringe containing 10mL ropivacaine 0.5% and 20mg triamcinolone was connected to the extension tubing and injected.     The procedure was repeated on the opposite side, for a total of 20mL ropivacaine 0.5% and 40mg triamcinolone    Hemostasis was achieved, the area was cleaned, and bandaids were placed when appropriate.    The patient tolerated the procedure well. The patient was carefully escorted to the recovery room in stable condition. After meeting discharge criteria, the patient was discharged home.      Post-procedure pain score: 1/10  Follow-up includes:   -f/u with referring provider    Physician Attestation   ARCHANA, Thiago Gtz MD, was present for all key and critical portions of the procedure, and I was immediately available during the remainder of the procedure.  Any changes to the documentation have been made above.    Thiago Gtz MD  Interventional Pain Medicine  [facility]

## 2025-04-04 NOTE — H&P
Kai CAMACHO [facility]  [id number]  male  64 year old      Reason for procedure/surgery: chronic abdominal pain    Patient Active Problem List   Diagnosis    CARDIOVASCULAR SCREENING; LDL GOAL LESS THAN 130    Benign localized prostatic hyperplasia with lower urinary tract symptoms (LUTS)    Calculus of kidney    Chronic sinusitis, unspecified    Degenerative joint disease of sacroiliac joint    ED (erectile dysfunction) of organic origin    Environmental allergies    FH: total knee replacement    Hyperlipidemia    Increased frequency of urination    Left inguinal hernia    Primary osteoarthritis involving multiple joints    Primary osteoarthritis of right knee    Psoriatic arthritis (H)    Pure hypercholesterolemia    Raised prostate specific antigen    Rheumatoid arthritis (H)    S/p nephrectomy    Immunodeficiency    Osteoarthrosis    Chronic abdominal pain       Past Surgical History:    Past Surgical History:   Procedure Laterality Date    CARPAL TUNNEL RELEASE RT/LT Bilateral     DAVINCI NEPHRECTOMY Right 04/06/2022    Procedure: NEPHRECTOMY, RADICAL, ROBOT-ASSISTED LAPAROSCOPIC;  Surgeon: Brad Luciano MD;  Location: [facility] OR    DAVINCI XI HERNIORRHAPHY INGUINAL Right 4/27/2023    Procedure: HERNIORRHAPHY, INGUINAL, ROBOT-ASSISTED, LAPAROSCOPIC, USING DA OBEY XI;  Surgeon: Jareth Julian MD;  Location: [facility] OR    DAVINCI XI HERNIORRHAPHY VENTRAL N/A 4/27/2023    Procedure: HERNIORRHAPHY, VENTRAL, ROBOT-ASSISTED, LAPAROSCOPIC, USING DA OBEY XI x 2;  Surgeon: Jareth Julian MD;  Location: [facility] OR    finger amputation surgery Right     Due to log splitter. x2 fingers    HERNIA REPAIR  01/01/1991    Right inguinal    LYSIS, ADHESIONS, ROBOT-ASSISTED, LAPAROSCOPIC, USING DA OBEY XI N/A 4/27/2023    Procedure: LYSIS, ADHESIONS, ROBOT-ASSISTED, LAPAROSCOPIC, USING DA OBEY XI;  Surgeon: Jareth Julian MD;  Location: [facility] OR    NOSE SURGERY      RI LAP,INGUINAL HERNIA  "REPR,INITIAL Left 2020    Procedure: HERNIORRHAPHY, INGUINAL, LAPAROSCOPIC;  Surgeon: Jareth Julian MD;  Location: [facility];  Service: General    VARICOCELE EXCISION         Past Medical History:   Past Medical History:   Diagnosis Date    Benign localized prostatic hyperplasia with lower urinary tract symptoms (LUTS) 2023    BPH (benign prostatic hyperplasia)     Calculus of kidney 2023    CARDIOVASCULAR SCREENING; LDL GOAL LESS THAN 130 10/31/2010    Chronic pain of right knee 2017    Chronic sinusitis, unspecified 2023    Degenerative joint disease of sacroiliac joint 2018    ED (erectile dysfunction)     ED (erectile dysfunction) of organic origin 2023    Environmental allergies 2023    Family history of kidney stones     FH: total knee replacement 2022    Gastroesophageal reflux disease     High prostate specific antigen (PSA) 2019    R97.20 : Raised prostate specific antigen      Hyperlipidemia     Hypertension     Increased frequency of urination 2019    Kidney mass     Kidney stone     Knee pain     right- \"bone on bone\"    Left inguinal hernia 2020    Formatting of this note might be different from the original. Added automatically from request for surgery [id number]    Motion sickness     Primary osteoarthritis involving multiple joints 2018    Primary osteoarthritis of right knee 2023    Psoriasis 2020    Psoriatic arthritis (H) 2016    Pure hypercholesterolemia 2023    RA (rheumatoid arthritis) (H)     psoriatic    Raised prostate specific antigen 2019    Rheumatoid arthritis (H) 2023    Right kidney mass 2022    S/p nephrectomy 2023    Seasonal allergies     Sinusitis     Squamous cell carcinoma of skin        Social History:   Social History     Tobacco Use    Smoking status: Former     Types: Cigars     Quit date: 3/12/2019     Years since quittin.0    Smokeless " "tobacco: Never   Substance Use Topics    Alcohol use: Yes     Comment: occasional       Family History:   Family History   Problem Relation Age of Onset    Heart Disease Mother     Heart Disease Father        Allergies: No Known Allergies    Active Medications:   Current Outpatient Medications   Medication Sig Dispense Refill    etanercept (ENBREL SURECLICK) 50 MG/ML autoinjector Inject 50 mg subcutaneously once a week. 4 mL 5    imipramine (TOFRANIL) 25 MG tablet Take 25 mg by mouth every morning.      metoprolol succinate ER (TOPROL XL) 25 MG 24 hr tablet Take 1 tablet (25 mg) by mouth daily 90 tablet 3    omeprazole (PRILOSEC) 20 MG DR capsule Take 1 capsule (20 mg) by mouth daily. 90 capsule 3    sildenafil (VIAGRA) 50 MG tablet Take 1 tablet (50 mg) by mouth daily as needed (ED). Take 60 minutes prior to sex. 30 tablet 2    tamsulosin (FLOMAX) 0.4 MG capsule Take 0.4 mg by mouth daily      Vitamin D3 (CHOLECALCIFEROL) 125 MCG (5000 UT) tablet Take 1 tablet by mouth daily         Systemic Review:   CONSTITUTIONAL: NEGATIVE for fever, chills, change in weight  ENT/MOUTH: NEGATIVE for ear, mouth and throat problems  RESP: NEGATIVE for significant cough or SOB  CV: NEGATIVE for chest pain, palpitations or peripheral edema    Physical Examination:   Vital Signs: BP (!) 149/94   Pulse 63   Temp 97.9  F (36.6  C) (Temporal)   Resp 14   Ht 1.82 m (5' 11.65\")   Wt 99.8 kg (220 lb)   SpO2 98%   BMI 30.13 kg/m    GENERAL: healthy appearing, alert and no distress  NECK: no adenopathy, no asymmetry, masses, or scars  RESP: normal work of breathing on room air  CV: warm, well perfused extremities, normal capillary refill  ABDOMEN: nondistended  MS: no gross musculoskeletal defects noted, no edema    Plan: Appropriate to proceed as scheduled.    Thiago Gtz MD  Interventional Pain Medicine  [facility]    4/4/2025          "

## 2025-04-04 NOTE — DISCHARGE INSTRUCTIONS
"PAIN INJECTION HOME CARE INSTRUCTIONS  Activity  -Rest today  -Do not work today  -Resume normal activity tomorrow  -DO NOT shower for 24 hours  -DO NOT remove bandaid for 24 hours    Pain  -You may experience soreness at the injection site for one or two days  -You may use an ice pack for 20 minutes every 2 hours for the first 24 hours  -You may use a heating pad after the first 24 hours  -You may use Tylenol (acetaminophen) every 4 hours or other pain medicines as directed by your physician    You may experience numbness radiating into your legs or arms (depending on the procedure location). This numbness may last several hours. Until sensation returns to normal; please use caution in walking, climbing stairs, and stepping out of your vehicle, etc.    DID YOU RECEIVE STEROIDS TODAY?  {YES / NO:[id number]::\"Yes\"}    Common side effects of steroids:  Not everyone will experience corticosteroid side effects. If side effects are experienced, they will gradually subside in the 7-10 day period following an injection. Most common side effects include:  -Flushed face and/or chest  -Feeling of warmth, particularly in the face but could be an overall feeling of warmth  -Increased blood sugar in diabetic patients  -Menstrual irregularities my occur. If taking hormone-based birth control an alternate method of birth control is recommended  -Sleep disturbances and/or mood swings are possible  -Leg cramps    Please contact us if you have:  -Severe pain  -Fever more than 101.5 degrees Fahrenheit  -Signs of infection at the injection site (redness, swelling, or drainage)    FOR PAIN CENTER PATIENTS:  If you have questions, please contact the Pain Clinic at [phone] Option #1 between the hours of 7:00 am and 3:00 pm Monday through Friday. After office hours you can contact the on call provider by dialing [phone]. If you need immediate attention, we recommend that you go to a hospital emergency room or dial [phone].      FOR PM&R " PATIENTS:  For patients seen by the PM and R service, please call [phone]. (Monday through Friday 8a-5p.  After business hours and weekends call [phone] and ask for the PM and R doctor on call). If you need to fax a pain diary to PM&R the fax number is [phone]. If you are unable to fax, uploading to [employer] is encouraged, then send to provider. If you have procedure scheduling questions please call [phone] Option #2.

## 2025-04-09 ENCOUNTER — TELEPHONE (OUTPATIENT)
Dept: RHEUMATOLOGY | Facility: CLINIC | Age: 65
End: 2025-04-09
Payer: COMMERCIAL

## 2025-04-09 NOTE — TELEPHONE ENCOUNTER
MTM appointment canceled, we made one more attempt to reschedule.     Routing back to referring provider and MTM Pharmacist Team      Refugio Dumas [facility]            4 = No assist / stand by assistance

## 2025-07-06 ENCOUNTER — NURSE TRIAGE (OUTPATIENT)
Dept: NURSING | Facility: CLINIC | Age: 65
End: 2025-07-06
Payer: COMMERCIAL

## 2025-07-06 ENCOUNTER — ANCILLARY PROCEDURE (OUTPATIENT)
Dept: GENERAL RADIOLOGY | Facility: CLINIC | Age: 65
End: 2025-07-06
Attending: NURSE PRACTITIONER
Payer: COMMERCIAL

## 2025-07-06 ENCOUNTER — OFFICE VISIT (OUTPATIENT)
Dept: URGENT CARE | Facility: URGENT CARE | Age: 65
End: 2025-07-06
Payer: COMMERCIAL

## 2025-07-06 VITALS
WEIGHT: 219 LBS | DIASTOLIC BLOOD PRESSURE: 78 MMHG | BODY MASS INDEX: 29.66 KG/M2 | HEART RATE: 63 BPM | SYSTOLIC BLOOD PRESSURE: 116 MMHG | RESPIRATION RATE: 16 BRPM | TEMPERATURE: 97.9 F | HEIGHT: 72 IN | OXYGEN SATURATION: 97 %

## 2025-07-06 DIAGNOSIS — S29.9XXA TRAUMATIC INJURY OF RIB: ICD-10-CM

## 2025-07-06 DIAGNOSIS — S20.211A CONTUSION OF RIB ON RIGHT SIDE, INITIAL ENCOUNTER: ICD-10-CM

## 2025-07-06 DIAGNOSIS — S29.9XXA TRAUMATIC INJURY OF RIB: Primary | ICD-10-CM

## 2025-07-06 PROCEDURE — 99213 OFFICE O/P EST LOW 20 MIN: CPT | Performed by: NURSE PRACTITIONER

## 2025-07-06 PROCEDURE — 71101 X-RAY EXAM UNILAT RIBS/CHEST: CPT | Mod: TC | Performed by: RADIOLOGY

## 2025-07-06 PROCEDURE — 3078F DIAST BP <80 MM HG: CPT | Performed by: NURSE PRACTITIONER

## 2025-07-06 PROCEDURE — 3074F SYST BP LT 130 MM HG: CPT | Performed by: NURSE PRACTITIONER

## 2025-07-06 RX ORDER — LIDOCAINE 50 MG/G
1 PATCH TOPICAL EVERY 24 HOURS
Qty: 10 PATCH | Refills: 0 | Status: SHIPPED | OUTPATIENT
Start: 2025-07-06 | End: 2025-07-16

## 2025-07-06 NOTE — PATIENT INSTRUCTIONS
Rib cage contusion bone bruise  X-ray is negative, will call if radiologist sees anything differently  Rest ice topical Lidoderm  Tylenol as needed  Reevaluation if new or worsening symptoms

## 2025-07-06 NOTE — PROGRESS NOTES
Urgent Care Clinic Visit    Chief Complaint   Patient presents with    Urgent Care     Rib pain on right side, fell on some boulders on last Monday.                7/6/2025    10:02 AM   Additional Questions   Roomed by MARTA Taveras

## 2025-07-06 NOTE — TELEPHONE ENCOUNTER
Nurse Triage SBAR    Is this a 2nd Level Triage? NO    Situation: Pt slipped and fell on a slippery rock and fell on his right side; injuring his right rib area    Background: Pt has one kidney per his report and does not take Ibuprofen     Assessment:   Fell on Monday and landed on a boulder on his side. Slippted on a rock and fell on boulder from standing position and height.  Hit his ribs on his right side. Also hit his jaw, but no injury to his jaw.  Only painful with moving his right arm  Able to breathe OK and take a deep breath  Has to use his left arm due to the pain in his rib with lifting right arm     Not taking anything for pain. Does not take Ibuprofen due to having only one kidney.     Protocol Recommended Disposition:   See PCP Within 24 Hours    Recommendation: See PCP within 24 hours. Pt plans to go to the [facility] . Care advice given     Reason for Disposition   [1] MODERATE pain (e.g., interferes with normal activities) AND [2] high-risk adult (e.g., age > 60 years, osteoporosis, chronic steroid use)    Additional Information   Negative: Major injury from dangerous force or speed (e.g., MVA, fall > 10 feet or 3 meters)   Negative: Bullet wound, knife wound, or other penetrating object   Negative: Puncture wound that sounds life-threatening to the triager   Negative: SEVERE difficulty breathing (e.g., struggling for each breath, speaks in single words)   Negative: [1] Major bleeding (e.g., actively dripping or spurting) AND [2] can't be stopped   Negative: Open wound of the chest with sound of moving air (sucking wound) or visible air bubbles   Negative: Shock suspected (e.g., cold/pale/clammy skin, too weak to stand, low BP, rapid pulse)   Negative: Coughing or spitting up blood   Negative: Bluish (or gray) lips or face now   Negative: Unconscious or was unconscious   Negative: Sounds like a life-threatening emergency to the triager   Negative: SEVERE chest pain   Negative: [1] Difficulty breathing AND  [2] not severe   Negative: Skin is split open or gaping   Negative: [1] Bleeding AND [2] won't stop after 10 minutes of direct pressure (using correct technique)   Negative: Sounds like a serious injury to the triager   Negative: Shallow puncture wound   Negative: [1] Can't take a deep breath BUT [2] no respiratory distress   Negative: [1] Collarbone is painful AND [2] difficulty raising arm   Negative: Suspicious history for the injury   Negative: Patient is confused or is an unreliable provider of information (e.g., dementia, severe intellectual disability, alcohol intoxication)    Protocols used: Chest Injury-A-AH  Medina Blake, RN   Triage Nurse Advisor on 7/6/2025 at 9:10 AM

## 2025-07-06 NOTE — PROGRESS NOTES
"Chief Complaint   Patient presents with    Urgent Care     Rib pain on right side, fell on some boulders on last Monday.      SUBJECTIVE:  Kai Jim is a 64 year old male presenting with right rib cage injury that occurred 6 days ago.  Fell on a slippery rock [place].  Pain to the rib cage area when lifting arm above head as well as sleeping in certain positions.  There is a deep aching.  Declines bruising bleeding bony deformity cough shortness of breath bloody sputum.  Prior lower rib cage fracture years ago.    Estimated Creatinine Clearance: 63.6 mL/min (A) (based on SCr of 1.42 mg/dL (H)).    Past Medical History:   Diagnosis Date    Benign localized prostatic hyperplasia with lower urinary tract symptoms (LUTS) 05/12/2023    BPH (benign prostatic hyperplasia)     Calculus of kidney 05/12/2023    CARDIOVASCULAR SCREENING; LDL GOAL LESS THAN 130 10/31/2010    Chronic pain of right knee 03/16/2017    Chronic sinusitis, unspecified 05/12/2023    Degenerative joint disease of sacroiliac joint 04/17/2018    ED (erectile dysfunction)     ED (erectile dysfunction) of organic origin 05/12/2023    Environmental allergies 05/12/2023    Family history of kidney stones     FH: total knee replacement 12/19/2022    Gastroesophageal reflux disease     High prostate specific antigen (PSA) 02/25/2019    R97.20 : Raised prostate specific antigen      Hyperlipidemia     Hypertension     Increased frequency of urination 01/17/2019    Kidney mass     Kidney stone     Knee pain     right- \"bone on bone\"    Left inguinal hernia 11/13/2020    Formatting of this note might be different from the original. Added automatically from request for surgery [id number]    Motion sickness     Primary osteoarthritis involving multiple joints 04/17/2018    Primary osteoarthritis of right knee 05/12/2023    Psoriasis 02/13/2020    Psoriatic arthritis (H) 01/08/2016    Pure hypercholesterolemia 05/12/2023    RA (rheumatoid arthritis) (H)     " "psoriatic    Raised prostate specific antigen 2019    Rheumatoid arthritis (H) 2023    Right kidney mass 2022    S/p nephrectomy 2023    Seasonal allergies     Sinusitis     Squamous cell carcinoma of skin      Current Outpatient Medications   Medication Sig Dispense Refill    etanercept (ENBREL SURECLICK) 50 MG/ML autoinjector Inject 50 mg subcutaneously once a week. 4 mL 5    imipramine (TOFRANIL) 25 MG tablet Take 25 mg by mouth every morning.      lidocaine (LIDODERM) 5 % patch Place 1 patch over 12 hours onto the skin every 24 hours for 10 days. To prevent lidocaine toxicity, patient should be patch free for 12 hrs daily. 10 patch 0    metoprolol succinate ER (TOPROL XL) 25 MG 24 hr tablet Take 1 tablet (25 mg) by mouth daily 90 tablet 3    omeprazole (PRILOSEC) 20 MG DR capsule Take 1 capsule (20 mg) by mouth daily. 90 capsule 3    sildenafil (VIAGRA) 50 MG tablet Take 1 tablet (50 mg) by mouth daily as needed (ED). Take 60 minutes prior to sex. 30 tablet 2    tamsulosin (FLOMAX) 0.4 MG capsule Take 0.4 mg by mouth daily      Vitamin D3 (CHOLECALCIFEROL) 125 MCG (5000 UT) tablet Take 1 tablet by mouth daily       No current facility-administered medications for this visit.     Social History     Tobacco Use    Smoking status: Former     Types: Cigars     Quit date: 3/12/2019     Years since quittin.3    Smokeless tobacco: Never   Substance Use Topics    Alcohol use: Yes     Comment: occasional     No Known Allergies    Review of Systems  ROS: 10 point ROS neg other than the symptoms noted above in the HPI.    OBJECTIVE:   /78   Pulse 63   Temp 97.9  F (36.6  C) (Oral)   Resp 16   Ht 1.816 m (5' 11.5\")   Wt 99.3 kg (219 lb)   SpO2 97%   BMI 30.12 kg/m      Physical Exam  Vitals reviewed.   Constitutional:       Appearance: Normal appearance.   HENT:      Head: Normocephalic and atraumatic.      Nose: Nose normal.      Mouth/Throat:      Mouth: Mucous membranes are moist. "      Pharynx: Oropharynx is clear.   Eyes:      Extraocular Movements: Extraocular movements intact.      Conjunctiva/sclera: Conjunctivae normal.      Pupils: Pupils are equal, round, and reactive to light.   Cardiovascular:      Rate and Rhythm: Normal rate.      Pulses: Normal pulses.   Pulmonary:      Effort: Pulmonary effort is normal. No respiratory distress.      Breath sounds: No stridor. No wheezing, rhonchi (questionable crackles at site of rib cage injury) or rales.   Chest:      Chest wall: No tenderness.   Musculoskeletal:         General: No swelling or tenderness. Normal range of motion.      Cervical back: Normal range of motion and neck supple.   Skin:     General: Skin is warm and dry.      Findings: No bruising, erythema or rash.   Neurological:      General: No focal deficit present.      Mental Status: He is alert and oriented to person, place, and time.   Psychiatric:         Mood and Affect: Mood normal.         Behavior: Behavior normal.       X-ray done in clinic read by me as negative for fracture or pneumothorax.    ASSESSMENT:    ICD-10-CM    1. Traumatic injury of rib  S29.9XXA XR Ribs & Chest Right G/E 3 Views     lidocaine (LIDODERM) 5 % patch      2. Contusion of rib on right side, initial encounter  S20.211A lidocaine (LIDODERM) 5 % patch          PLAN:     Rib cage contusion bone bruise  X-ray is negative, will call if radiologist sees anything differently  Rest ice topical Lidoderm  Tylenol as needed  Reevaluation if new or worsening symptoms    Follow up with primary care provider with any problems, questions or concerns or if symptoms worsen or fail to improve. Patient agreed to plan and verbalized understanding.    Deepa Martinez, WALLY-BC  [facility]

## 2025-07-08 ENCOUNTER — TELEPHONE (OUTPATIENT)
Dept: URGENT CARE | Facility: URGENT CARE | Age: 65
End: 2025-07-08
Payer: COMMERCIAL

## 2025-07-09 NOTE — TELEPHONE ENCOUNTER
PRIOR AUTHORIZATION DENIED    Medication: LIDOCAINE 5 % EX PTCH  Insurance Company: [employer] - Phone [phone] Fax [phone]  Denial Date: 7/9/2025  Denial Reason(s): DIAGNOSIS NOT COVERED      Appeal Information:   Patient Notified: NO

## 2025-07-09 NOTE — TELEPHONE ENCOUNTER
Left voicemail for patient to call back so we could let him know what to  over the counter   Alex Callejas

## 2025-08-31 DIAGNOSIS — I77.819 AORTIC DILATATION: ICD-10-CM

## 2025-08-31 DIAGNOSIS — I10 PRIMARY HYPERTENSION: Primary | ICD-10-CM

## 2025-09-02 RX ORDER — METOPROLOL SUCCINATE 25 MG/1
25 TABLET, EXTENDED RELEASE ORAL DAILY
Qty: 90 TABLET | Refills: 0 | Status: SHIPPED | OUTPATIENT
Start: 2025-09-02

## (undated) DEVICE — DAVINCI XI SEAL UNIVERSAL 5-8MM 470361

## (undated) DEVICE — SYSTEM LAPAROVUE VISIBILITY LAPVUE10

## (undated) DEVICE — SU WND CLOSURE V-LOC PBT SZ 0 18" GS-21 VLOCN0326

## (undated) DEVICE — DAVINCI HOT SHEARS TIP COVER  400180

## (undated) DEVICE — NDL INSUFFLATION 13GA 120MM C2201

## (undated) DEVICE — TAPE ADH POROUS 3IN CURITY STD 7046C

## (undated) DEVICE — CUSTOM PACK LAP CHOLE SBA5BLCHEA

## (undated) DEVICE — KIT PATIENT POSITIONING PIGAZZI LATEX FREE 40580

## (undated) DEVICE — ESU GROUND PAD ADULT REM W/15' CORD E7507DB

## (undated) DEVICE — CUSTOM PACK DA VINCI GYN SMA5BDVHEA

## (undated) DEVICE — TUBING SUCTION MEDI-VAC 1/4"X20' N620A - HE

## (undated) DEVICE — TUBING FILTER TRI-LUMEN AIRSEAL ASC-EVAC1

## (undated) DEVICE — CLIP ENDO HEMO-LOC PURPLE LG 544240

## (undated) DEVICE — Device

## (undated) DEVICE — DRSG DRAIN 4X4" 7086

## (undated) DEVICE — ENDO TROCAR SLEEVE KII Z-THREADED 05X100MM CTS02

## (undated) DEVICE — DRAPE IOBAN INCISE 23X17" 6650EZ

## (undated) DEVICE — ENDO POUCH UNIVERSAL RETRIEVAL SYSTEM INZII 12/15MM CD004

## (undated) DEVICE — SUTURE VICRYL+ 0 36IN CT-1 UND VCP946H

## (undated) DEVICE — DRAPE U SPLIT 74X120" 29440

## (undated) DEVICE — SU MONOCRYL+ 4-0 18IN PS2 UND MCP496G

## (undated) DEVICE — DAVINCI XI DRAPE COLUMN 470341

## (undated) DEVICE — SYR 50ML SLIP TIP W/O NDL 309654

## (undated) DEVICE — ENDO TROCAR CONMED AIRSEAL BLADELESS 12X120MM IAS12-120LP

## (undated) DEVICE — ENDO TROCAR FIRST ENTRY KII FIOS Z-THRD 05X100MM CTF03

## (undated) DEVICE — SYR 20ML LL W/O NDL 302830

## (undated) DEVICE — TUBING LAP SUCT/IRRIG STRYKER 250070500

## (undated) DEVICE — PROBE COVER INTRAOPERATIVE 5"X96" PC1308

## (undated) DEVICE — NDL 25GA 1.5" 305127

## (undated) DEVICE — TUBING SMOKE EVAC PNEUMOCLEAR HIGH FLOW 0620050250

## (undated) DEVICE — STRAP CATH LEG ADJUSTABLE 0814-8200

## (undated) DEVICE — PREP POVIDONE IODINE SOLUTION 10% 4OZ BOTTLE 29906-004

## (undated) DEVICE — DRSG STERI STRIP 1/2X4" R1547

## (undated) DEVICE — LUBRICANT INST ELECTROLUBE EL101

## (undated) DEVICE — DAVINCI XI DRAPE ARM 470015

## (undated) DEVICE — DRESSING BANDAID SURFING CAT ABN4075D

## (undated) DEVICE — CATH TRAY FOLEY SURESTEP 16FR DRAIN BAG STATOCK A899916

## (undated) DEVICE — NEEDLE HYPO 22X1-1/2 SAFETY 305900

## (undated) DEVICE — ADH SKIN CLOSURE PREMIERPRO EXOFIN 1.0ML 3470

## (undated) DEVICE — CATH FOLEY 5CC 16FR SIL/LTX 0165V16S

## (undated) DEVICE — DAVINCI XI OBTURATOR BLADELESS 8MM 470359

## (undated) DEVICE — PREP DURAPREP 26ML APL 8630

## (undated) DEVICE — DRAPE SHEET REV FOLD 3/4 9349

## (undated) DEVICE — GLOVE SURGEON PI ORTHO SZ 6-1/2 LF

## (undated) DEVICE — SUTURE VICRYL+ 2-0 27IN SH UND VCP417H

## (undated) DEVICE — TRAY PAIN INJECTION 97A 640

## (undated) DEVICE — DAVINCI XI OBTURATOR 8MM BLADELESS LONG 470360

## (undated) DEVICE — DRAPE SHEET TABLE COVER KC 42301*

## (undated) DEVICE — PREP CHLORAPREP 26ML TINTED HI-LITE ORANGE 930815

## (undated) DEVICE — SU VICRYL+ 0 27 UR6 VLT VCP603H

## (undated) DEVICE — SOL WATER IRRIG 1000ML BOTTLE 2F7114

## (undated) DEVICE — DRAPE LAP/CHOLE W/O POUCH 89225

## (undated) DEVICE — DRAPE POUCH INSTRUMENT 3 POCKET 1018L

## (undated) DEVICE — PREP CHLORAPREP W/ORANGE TINT 10.5ML 260715

## (undated) DEVICE — DECANTER VIAL 2006S

## (undated) DEVICE — GLOVE BIOGEL INDICATOR 7.5 LF 41675

## (undated) DEVICE — STPL POWERED ECHELON 60MM PSEE60A

## (undated) DEVICE — GLOVE BIOGEL PI ULTRATOUCH G SZ 7.5 42175

## (undated) DEVICE — GLOVE BIOGEL PI ORTHOPRO SZ 7.5 47675

## (undated) DEVICE — SUCTION MANIFOLD NEPTUNE 2 SYS 1 PORT 702-025-000

## (undated) DEVICE — GLOVE BIOGEL PI MICRO SZ 7.5 48575

## (undated) DEVICE — SUTURE VICRYL+ 0 27IN CT-1 UND VCP260H

## (undated) DEVICE — SU WND CLOSURE V-LOC 90 SZ 2-0 12" GS-21 VLOCM0315

## (undated) DEVICE — GOWN SM/MED DISP 9505

## (undated) DEVICE — ESU PENCIL W/HOLSTER E2350H

## (undated) RX ORDER — FENTANYL CITRATE-0.9 % NACL/PF 10 MCG/ML
PLASTIC BAG, INJECTION (ML) INTRAVENOUS
Status: DISPENSED
Start: 2022-04-06

## (undated) RX ORDER — KETAMINE HYDROCHLORIDE 10 MG/ML
INJECTION INTRAMUSCULAR; INTRAVENOUS
Status: DISPENSED
Start: 2023-04-27

## (undated) RX ORDER — EPHEDRINE SULFATE 50 MG/ML
INJECTION, SOLUTION INTRAMUSCULAR; INTRAVENOUS; SUBCUTANEOUS
Status: DISPENSED
Start: 2022-04-06

## (undated) RX ORDER — FENTANYL CITRATE 50 UG/ML
INJECTION, SOLUTION INTRAMUSCULAR; INTRAVENOUS
Status: DISPENSED
Start: 2022-04-06

## (undated) RX ORDER — PROPOFOL 10 MG/ML
INJECTION, EMULSION INTRAVENOUS
Status: DISPENSED
Start: 2022-04-06

## (undated) RX ORDER — EPHEDRINE SULFATE 50 MG/ML
INJECTION, SOLUTION INTRAMUSCULAR; INTRAVENOUS; SUBCUTANEOUS
Status: DISPENSED
Start: 2023-04-27

## (undated) RX ORDER — DEXAMETHASONE SODIUM PHOSPHATE 10 MG/ML
INJECTION INTRAMUSCULAR; INTRAVENOUS
Status: DISPENSED
Start: 2022-04-06

## (undated) RX ORDER — BUPIVACAINE HYDROCHLORIDE 2.5 MG/ML
INJECTION, SOLUTION EPIDURAL; INFILTRATION; INTRACAUDAL
Status: DISPENSED
Start: 2023-04-27

## (undated) RX ORDER — FENTANYL CITRATE 50 UG/ML
INJECTION, SOLUTION INTRAMUSCULAR; INTRAVENOUS
Status: DISPENSED
Start: 2023-04-27

## (undated) RX ORDER — GLYCOPYRROLATE 0.2 MG/ML
INJECTION INTRAMUSCULAR; INTRAVENOUS
Status: DISPENSED
Start: 2022-04-06

## (undated) RX ORDER — ONDANSETRON 2 MG/ML
INJECTION INTRAMUSCULAR; INTRAVENOUS
Status: DISPENSED
Start: 2022-04-06

## (undated) RX ORDER — BUPIVACAINE HYDROCHLORIDE 5 MG/ML
INJECTION, SOLUTION PERINEURAL
Status: DISPENSED
Start: 2022-04-06